# Patient Record
Sex: FEMALE | Race: WHITE | NOT HISPANIC OR LATINO | Employment: OTHER | URBAN - METROPOLITAN AREA
[De-identification: names, ages, dates, MRNs, and addresses within clinical notes are randomized per-mention and may not be internally consistent; named-entity substitution may affect disease eponyms.]

---

## 2017-02-10 ENCOUNTER — APPOINTMENT (OUTPATIENT)
Dept: WOMENS IMAGING | Facility: HOSPITAL | Age: 67
End: 2017-02-10

## 2017-02-10 PROCEDURE — G0202 SCR MAMMO BI INCL CAD: HCPCS | Performed by: RADIOLOGY

## 2018-05-10 ENCOUNTER — APPOINTMENT (OUTPATIENT)
Dept: WOMENS IMAGING | Facility: HOSPITAL | Age: 68
End: 2018-05-10

## 2018-05-10 PROCEDURE — 77063 BREAST TOMOSYNTHESIS BI: CPT | Performed by: RADIOLOGY

## 2018-05-10 PROCEDURE — 77067 SCR MAMMO BI INCL CAD: CPT | Performed by: RADIOLOGY

## 2019-04-23 RX ORDER — BUDESONIDE AND FORMOTEROL FUMARATE DIHYDRATE 160; 4.5 UG/1; UG/1
2 AEROSOL RESPIRATORY (INHALATION)
COMMUNITY
End: 2019-04-23 | Stop reason: SDUPTHER

## 2019-04-23 RX ORDER — BUDESONIDE AND FORMOTEROL FUMARATE DIHYDRATE 160; 4.5 UG/1; UG/1
2 AEROSOL RESPIRATORY (INHALATION)
Qty: 3 INHALER | Refills: 3 | Status: SHIPPED | OUTPATIENT
Start: 2019-04-23 | End: 2019-05-21 | Stop reason: SDUPTHER

## 2019-05-21 ENCOUNTER — OFFICE VISIT (OUTPATIENT)
Dept: INTERNAL MEDICINE | Facility: CLINIC | Age: 69
End: 2019-05-21

## 2019-05-21 VITALS
HEART RATE: 76 BPM | SYSTOLIC BLOOD PRESSURE: 128 MMHG | HEIGHT: 67 IN | BODY MASS INDEX: 27.62 KG/M2 | WEIGHT: 176 LBS | DIASTOLIC BLOOD PRESSURE: 72 MMHG

## 2019-05-21 DIAGNOSIS — Z00.00 MEDICARE ANNUAL WELLNESS VISIT, SUBSEQUENT: ICD-10-CM

## 2019-05-21 DIAGNOSIS — G43.709 CHRONIC MIGRAINE WITHOUT AURA WITHOUT STATUS MIGRAINOSUS, NOT INTRACTABLE: ICD-10-CM

## 2019-05-21 DIAGNOSIS — R49.0 HOARSENESS: ICD-10-CM

## 2019-05-21 DIAGNOSIS — J45.20 MILD INTERMITTENT ASTHMA WITHOUT COMPLICATION: ICD-10-CM

## 2019-05-21 DIAGNOSIS — K52.9 CHRONIC DIARRHEA: ICD-10-CM

## 2019-05-21 DIAGNOSIS — I10 ESSENTIAL HYPERTENSION: Primary | ICD-10-CM

## 2019-05-21 PROBLEM — K21.9 GASTROESOPHAGEAL REFLUX DISEASE: Status: ACTIVE | Noted: 2019-05-21

## 2019-05-21 PROBLEM — M72.0 DUPUYTREN'S CONTRACTURE: Status: ACTIVE | Noted: 2019-05-21

## 2019-05-21 PROBLEM — G43.909 MIGRAINE: Status: ACTIVE | Noted: 2019-05-21

## 2019-05-21 PROBLEM — J45.909 ASTHMA: Status: ACTIVE | Noted: 2019-05-21

## 2019-05-21 PROBLEM — M16.9 OSTEOARTHRITIS OF HIP: Status: ACTIVE | Noted: 2019-05-21

## 2019-05-21 PROCEDURE — G0439 PPPS, SUBSEQ VISIT: HCPCS | Performed by: INTERNAL MEDICINE

## 2019-05-21 PROCEDURE — 96160 PT-FOCUSED HLTH RISK ASSMT: CPT | Performed by: INTERNAL MEDICINE

## 2019-05-21 RX ORDER — CITALOPRAM 20 MG/1
TABLET ORAL
COMMUNITY
Start: 2019-04-09 | End: 2020-02-05

## 2019-05-21 RX ORDER — SPIRONOLACTONE 100 MG/1
100 TABLET, FILM COATED ORAL DAILY
COMMUNITY
End: 2020-05-21 | Stop reason: SDUPTHER

## 2019-05-21 RX ORDER — MONTELUKAST SODIUM 10 MG/1
TABLET ORAL
COMMUNITY
Start: 2019-04-09 | End: 2019-08-19 | Stop reason: SDUPTHER

## 2019-05-21 RX ORDER — DAPSONE 25 MG/1
25 TABLET ORAL DAILY
COMMUNITY

## 2019-05-21 RX ORDER — AMITRIPTYLINE HYDROCHLORIDE 10 MG/1
TABLET, FILM COATED ORAL
COMMUNITY
Start: 2019-04-09 | End: 2019-12-03 | Stop reason: SDUPTHER

## 2019-05-21 NOTE — PROGRESS NOTES
QUICK REFERENCE INFORMATION:  The ABCs of the Annual Wellness Visit    Subsequent Medicare Wellness Visit    HEALTH RISK ASSESSMENT    1950    Recent Hospitalizations:  No hospitalization(s) within the last year..    Current Medical Providers:  Patient Care Team:  Myrna Tafoya MD as PCP - General (Internal Medicine)  Iza Porter MD as Consulting Physician (Obstetrics and Gynecology)    Smoking Status:  Social History     Tobacco Use   Smoking Status Former Smoker   • Last attempt to quit:    • Years since quittin.3   Smokeless Tobacco Never Used       Alcohol Consumption:  Social History     Substance and Sexual Activity   Alcohol Use Yes   • Alcohol/week: 1.2 oz   • Types: 2 Cans of beer per week    Comment: socially       Depression Screen:   PHQ-2/PHQ-9 Depression Screening 2019   Little interest or pleasure in doing things 0   Feeling down, depressed, or hopeless 0   Trouble falling or staying asleep, or sleeping too much 0   Feeling tired or having little energy 0   Poor appetite or overeating 0   Feeling bad about yourself - or that you are a failure or have let yourself or your family down 0   Trouble concentrating on things, such as reading the newspaper or watching television 0   Moving or speaking so slowly that other people could have noticed. Or the opposite - being so fidgety or restless that you have been moving around a lot more than usual 0   Thoughts that you would be better off dead, or of hurting yourself in some way 0   Total Score 0       Health Habits and Functional and Cognitive Screening:  Functional & Cognitive Status 2019   Do you have difficulty preparing food and eating? No   Do you have difficulty bathing yourself, getting dressed or grooming yourself? No   Do you have difficulty using the toilet? No   Do you have difficulty moving around from place to place? No   Do you have trouble with steps or getting out of a bed or a chair? No   In the past year have  you fallen or experienced a near fall? No   Current Diet Well Balanced Diet   Dental Exam Up to date   Eye Exam Up to date   Exercise (times per week) 3 times per week   Current Exercise Activities Include Walking   Do you need help using the phone?  No   Are you deaf or do you have serious difficulty hearing?  No   Do you need help with transportation? No   Do you need help shopping? No   Do you need help preparing meals?  No   Do you need help with housework?  No   Do you need help with laundry? No   Do you need help taking your medications? No   Do you need help managing money? No   Do you ever drive or ride in a car without wearing a seat belt? No   Have you felt unusual stress, anger or loneliness in the last month? No   Who do you live with? Alone   If you need help, do you have trouble finding someone available to you? No   Have you been bothered in the last four weeks by sexual problems? No   Do you have difficulty concentrating, remembering or making decisions? No     Activities of Daily Living  Do you have difficulty preparing food and eating?: No  Do you have difficulty bathing yourself, getting dressed or grooming yourself?: No  Do you have difficulty using the toilet?: No  Do you have difficulty moving around from place to place?: No  Do you have trouble with steps or getting out of a bed or a chair?: No  In the past year have you fallen or experienced a near fall?: No  Instrumental Activities of Daily Living  Do you need help using the phone? : No  Are you deaf or do you have serious difficulty hearing? : No  Do you need help with transportation?: No  Do you need help shopping?: No  Do you need help preparing meals? : No  Do you need help with housework? : No  Do you need help with laundry?: No  Do you need help taking your medications?: No  Do you need help managing money?: No  Do you ever drive or ride in a car without wearing a seat belt?: No  Psychosocial Risks  Have you felt unusual stress, anger  or loneliness in the last month?: No  Who do you live with?: Alone  If you need help, do you have trouble finding someone available to you?: No  Have you been bothered in the last four weeks by sexual problems?: No  Cognitive Status  Do you have difficulty concentrating, remembering or making decisions?: No  Health Habits  Current Diet: Well Balanced Diet  Dental Exam: Up to date  Eye Exam: Up to date  Exercise (times per week): 3 times per week  Current Exercise Activities Include: Walking    Does the patient have evidence of cognitive impairment? No     Aspirin use counseling: Does not need ASA (and currently is not on it)    Recent Lab Results:  CMP:  Lab Results   Component Value Date    GLU 91 08/14/2015    BUN 9 08/14/2015    CREATININE 0.81 08/14/2015    EGFRIFNONA >60 08/14/2015    EGFRIFAFRI >60 08/14/2015    BCR 11 08/14/2015     08/14/2015    K 4.7 08/14/2015    CO2 26 08/14/2015    CALCIUM 10.2 08/14/2015    PROTENTOTREF 7.6 08/14/2015    ALBUMIN 4.7 08/14/2015    LABGLOBREF 2.9 08/14/2015    LABIL2 1.6 08/14/2015    BILITOT 0.4 08/14/2015    ALKPHOS 53 08/14/2015    AST 28 08/14/2015    ALT 33 08/14/2015     Lipid Panel:  Lab Results   Component Value Date    TRIG 123 08/14/2015    HDL 57 08/14/2015    VLDL 25 08/14/2015     HbA1c:       Visual Acuity:  No exam data present    Age-appropriate Screening Schedule:  Refer to the list below for future screening recommendations based on patient's age, sex and/or medical conditions. Orders for these recommended tests are listed in the plan section. The patient has been provided with a written plan.    Health Maintenance   Topic Date Due   • TDAP/TD VACCINES (1 - Tdap) 07/19/1967   • ZOSTER VACCINE (1 of 2) 07/19/1998   • PNEUMOCOCCAL VACCINES (65+ LOW/MEDIUM RISK) (1 of 2 - PCV13) 07/19/2013   • COLONOSCOPY  02/12/2016   • INFLUENZA VACCINE  08/01/2019        Subjective   History of Present Illness    Che Landaverde is a 68 y.o. female who presents for  a Subsequent Wellness Visit.  HPI  Overall no issues- she does have occ cramping in her fingers and toes.  Not associated with increase in diarrhea, etc.  Eating and drinking well.    Hoarseness is better, or at least stable.    The following portions of the patient's history were reviewed and updated as appropriate: allergies, current medications, past family history, past medical history, past social history, past surgical history and problem list.    Outpatient Medications Prior to Visit   Medication Sig Dispense Refill   • amitriptyline (ELAVIL) 10 MG tablet      • budesonide-formoterol (SYMBICORT) 160-4.5 MCG/ACT inhaler 2 (two) times a day.     • calcium citrate-vitamin d (CALCIUM CITRATE + D3) 200-250 MG-UNIT tablet tablet Take  by mouth.     • Cetirizine HCl (ZYRTEC ALLERGY) 10 MG capsule Take  by mouth daily.     • citalopram (CeleXA) 20 MG tablet      • dapsone 25 MG tablet Take 25 mg by mouth Daily.     • fluticasone (FLONASE) 50 MCG/ACT nasal spray into each nostril.     • gabapentin (NEURONTIN) 300 MG capsule Take 1 capsule (300 mg total) by mouth nightly. 90 capsule 1   • metoprolol tartrate (LOPRESSOR) 25 MG tablet Take  by mouth every night.     • MICRONIZED COLESTIPOL HCL 1 G tablet      • montelukast (SINGULAIR) 10 MG tablet      • Multiple Vitamins-Minerals (MULTI COMPLETE PO) Take  by mouth.     • Omega-3 Fatty Acids (FISH OIL) 1200 MG capsule capsule Take  by mouth.     • omeprazole (PriLOSEC) 40 MG capsule Take 40 mg by mouth daily.     • polycarbophil (FIBERCON) 625 MG tablet Take 625 mg by mouth as needed.     • Probiotic capsule Take  by mouth.     • spironolactone (ALDACTONE) 100 MG tablet Take  by mouth daily.     • spironolactone (ALDACTONE) 100 MG tablet Take 100 mg by mouth Daily.     • SUMAtriptan (IMITREX) 100 MG tablet Take one tablet at onset of headache. May repeat dose one time in 2 hours if headache not relieved.     • VENTOLIN  (90 BASE) MCG/ACT inhaler      •  "diphenoxylate-atropine (LOMOTIL) 2.5-0.025 MG per tablet Take 1-2 tablets by mouth 4 (four) times a day as needed for diarrhea. 120 tablet 2   • amitriptyline (ELAVIL) 25 MG tablet Take  by mouth.     • budesonide-formoterol (SYMBICORT) 160-4.5 MCG/ACT inhaler Inhale 2 puffs 2 (Two) Times a Day. 3 inhaler 3   • diclofenac (VOLTAREN) 75 MG EC tablet TAKE 1 TABLET EVERY DAY 90 tablet 0   • lansoprazole (PREVACID) 30 MG capsule Take 1 capsule by mouth 2 (two) times a day.       No facility-administered medications prior to visit.        Patient Active Problem List   Diagnosis   • Asthma   • Chronic diarrhea   • Gastroesophageal reflux disease   • Hoarseness   • Hypertension   • Migraine   • Osteoarthritis of hip   • Dupuytren's contracture       Advance Care Planning:  Patient has an advance directive - a copy has not been provided. Have asked the patient to send this to us to add to record    Identification of Risk Factors:  Risk factors include: hearing limitations    Review of Systems   Constitutional: Negative for unexpected weight change.   HENT: Negative.    Respiratory: Negative.    Cardiovascular: Negative.    Genitourinary: Negative.    Neurological: Negative.    Psychiatric/Behavioral: Negative.        Compared to one year ago, the patient feels her physical health is better and her mental health is worse.    Objective     Physical Exam   Constitutional: No distress.   Cardiovascular: Normal rate, regular rhythm and normal heart sounds.   Pulmonary/Chest: Effort normal and breath sounds normal.   Musculoskeletal: She exhibits no edema.       Vitals:    05/21/19 0728 05/21/19 0804   BP:  128/72   Pulse:  76   Weight: 79.8 kg (176 lb)    Height: 170.2 cm (67\")        Patient's Body mass index is 27.57 kg/m². BMI is within normal parameters. No follow-up required..        Assessment/Plan   Patient Self-Management and Personalized Health Advice  The patient has been provided with information about: none and " preventive services including:     · Exercise counseling provided, Shingles Vaccine recommended.    Visit Diagnoses:  Problem List Items Addressed This Visit        Cardiovascular and Mediastinum    Hypertension - Primary    Relevant Medications    spironolactone (ALDACTONE) 100 MG tablet    Other Relevant Orders    Comprehensive Metabolic Panel    Migraine    Relevant Medications    amitriptyline (ELAVIL) 10 MG tablet    citalopram (CeleXA) 20 MG tablet       Respiratory    Asthma    Relevant Medications    montelukast (SINGULAIR) 10 MG tablet       Digestive    Chronic diarrhea       Other    Hoarseness      Other Visit Diagnoses     Medicare annual wellness visit, subsequent        to get Shingrix at pharmacy- other immunizations UTD          Orders Placed This Encounter   Procedures   • Comprehensive Metabolic Panel       Outpatient Encounter Medications as of 5/21/2019   Medication Sig Dispense Refill   • amitriptyline (ELAVIL) 10 MG tablet      • budesonide-formoterol (SYMBICORT) 160-4.5 MCG/ACT inhaler 2 (two) times a day.     • calcium citrate-vitamin d (CALCIUM CITRATE + D3) 200-250 MG-UNIT tablet tablet Take  by mouth.     • Cetirizine HCl (ZYRTEC ALLERGY) 10 MG capsule Take  by mouth daily.     • citalopram (CeleXA) 20 MG tablet      • dapsone 25 MG tablet Take 25 mg by mouth Daily.     • fluticasone (FLONASE) 50 MCG/ACT nasal spray into each nostril.     • gabapentin (NEURONTIN) 300 MG capsule Take 1 capsule (300 mg total) by mouth nightly. 90 capsule 1   • metoprolol tartrate (LOPRESSOR) 25 MG tablet Take  by mouth every night.     • MICRONIZED COLESTIPOL HCL 1 G tablet      • montelukast (SINGULAIR) 10 MG tablet      • Multiple Vitamins-Minerals (MULTI COMPLETE PO) Take  by mouth.     • Omega-3 Fatty Acids (FISH OIL) 1200 MG capsule capsule Take  by mouth.     • omeprazole (PriLOSEC) 40 MG capsule Take 40 mg by mouth daily.     • polycarbophil (FIBERCON) 625 MG tablet Take 625 mg by mouth as needed.      • Probiotic capsule Take  by mouth.     • spironolactone (ALDACTONE) 100 MG tablet Take  by mouth daily.     • spironolactone (ALDACTONE) 100 MG tablet Take 100 mg by mouth Daily.     • SUMAtriptan (IMITREX) 100 MG tablet Take one tablet at onset of headache. May repeat dose one time in 2 hours if headache not relieved.     • VENTOLIN  (90 BASE) MCG/ACT inhaler      • diphenoxylate-atropine (LOMOTIL) 2.5-0.025 MG per tablet Take 1-2 tablets by mouth 4 (four) times a day as needed for diarrhea. 120 tablet 2   • [DISCONTINUED] amitriptyline (ELAVIL) 25 MG tablet Take  by mouth.     • [DISCONTINUED] budesonide-formoterol (SYMBICORT) 160-4.5 MCG/ACT inhaler Inhale 2 puffs 2 (Two) Times a Day. 3 inhaler 3   • [DISCONTINUED] diclofenac (VOLTAREN) 75 MG EC tablet TAKE 1 TABLET EVERY DAY 90 tablet 0   • [DISCONTINUED] lansoprazole (PREVACID) 30 MG capsule Take 1 capsule by mouth 2 (two) times a day.       No facility-administered encounter medications on file as of 5/21/2019.        Reviewed use of high risk medication in the elderly: not applicable  Reviewed for potential of harmful drug interactions in the elderly: not applicable    Follow Up:  Return in about 6 months (around 11/21/2019).     An After Visit Summary and PPPS with all of these plans were given to the patient.

## 2019-06-11 ENCOUNTER — APPOINTMENT (OUTPATIENT)
Dept: WOMENS IMAGING | Facility: HOSPITAL | Age: 69
End: 2019-06-11

## 2019-06-11 PROCEDURE — 77063 BREAST TOMOSYNTHESIS BI: CPT | Performed by: RADIOLOGY

## 2019-06-11 PROCEDURE — 77067 SCR MAMMO BI INCL CAD: CPT | Performed by: RADIOLOGY

## 2019-07-22 RX ORDER — MONTELUKAST SODIUM 4 MG/1
TABLET, CHEWABLE ORAL
Qty: 180 TABLET | Refills: 3 | Status: SHIPPED | OUTPATIENT
Start: 2019-07-22 | End: 2021-02-05 | Stop reason: SDUPTHER

## 2019-08-19 RX ORDER — MONTELUKAST SODIUM 10 MG/1
TABLET ORAL
Qty: 90 TABLET | Refills: 1 | Status: SHIPPED | OUTPATIENT
Start: 2019-08-19 | End: 2020-01-21

## 2019-08-19 RX ORDER — GABAPENTIN 300 MG/1
CAPSULE ORAL
Qty: 180 CAPSULE | Refills: 1 | Status: SHIPPED | OUTPATIENT
Start: 2019-08-19 | End: 2020-02-05

## 2019-11-20 RX ORDER — BUDESONIDE AND FORMOTEROL FUMARATE DIHYDRATE 160; 4.5 UG/1; UG/1
2 AEROSOL RESPIRATORY (INHALATION) EVERY 12 HOURS
Qty: 3 INHALER | Refills: 3 | Status: SHIPPED | OUTPATIENT
Start: 2019-11-20 | End: 2021-01-05

## 2019-11-21 ENCOUNTER — OFFICE VISIT (OUTPATIENT)
Dept: INTERNAL MEDICINE | Facility: CLINIC | Age: 69
End: 2019-11-21

## 2019-11-21 VITALS
WEIGHT: 176 LBS | SYSTOLIC BLOOD PRESSURE: 118 MMHG | HEART RATE: 74 BPM | HEIGHT: 67 IN | BODY MASS INDEX: 27.62 KG/M2 | DIASTOLIC BLOOD PRESSURE: 64 MMHG

## 2019-11-21 DIAGNOSIS — K90.0 CELIAC DISEASE: ICD-10-CM

## 2019-11-21 DIAGNOSIS — E78.5 HYPERLIPIDEMIA, UNSPECIFIED HYPERLIPIDEMIA TYPE: ICD-10-CM

## 2019-11-21 DIAGNOSIS — I10 ESSENTIAL HYPERTENSION: Primary | ICD-10-CM

## 2019-11-21 PROCEDURE — 99213 OFFICE O/P EST LOW 20 MIN: CPT | Performed by: INTERNAL MEDICINE

## 2019-11-21 NOTE — PROGRESS NOTES
Assessment and Plan  Che was seen today for hypertension and gi problem.    Diagnoses and all orders for this visit:    Essential hypertension  -     Comprehensive Metabolic Panel; Future    Hyperlipidemia, unspecified hyperlipidemia type  -     Lipid Panel With LDL / HDL Ratio; Future  -     TSH; Future    Celiac disease  Comments:  small bowel biopsy was neg although labs are + I think she can have some gluten on a limited basis- watch for bowel change, rash.      F/U and Patient Instructions    Return in about 6 months (around 5/21/2020) for Medicare Wellness.  There are no Patient Instructions on file for this visit.    Subjective    Che Landaverde is a 69 y.o. female being seen in our office today for Hypertension and GI Problem     History of the Present Illness  HPI Pt diagnosed with celiac after biopsies of skin.  She has done well on gluten free diet and Dapsone- has had no further skin manifestations.  Has tried to stop the Dapsone but the blisters come back.   Reviewed labs from 2016- told that they were + due to microscopic colitis, negative small bowel biopsies.        Patient History        Significant Past History  The following portions of the patient's history were reviewed and updated as appropriate:PMHroutine: Social history , Allergies, Current Medications, Active Problem List and Health Maintenance              Social History  She  reports that she quit smoking about 9 years ago. She has never used smokeless tobacco. She reports that she drinks about 1.2 oz of alcohol per week. She reports that she does not use drugs.                         Review of Symptoms  Review of Systems   Constitution: Negative for weight loss.   Cardiovascular: Negative.    Respiratory: Negative.    Skin:        Under control   Gastrointestinal: Negative for bloating, abdominal pain, change in bowel habit and diarrhea.     Objective  Vital Signs         BP Readings from Last 1 Encounters:   11/21/19 118/64     Wt  Readings from Last 3 Encounters:   11/21/19 79.8 kg (176 lb)   09/22/19 79.4 kg (175 lb)   05/21/19 79.8 kg (176 lb)   Body mass index is 27.57 kg/m².          Physical Exam   Physical Exam   Constitutional: No distress.   Cardiovascular: Normal rate.   Abdominal: Soft.     Data Reviewed    No results found for this or any previous visit (from the past 2016 hour(s)).

## 2019-12-03 RX ORDER — AMITRIPTYLINE HYDROCHLORIDE 10 MG/1
TABLET, FILM COATED ORAL
Qty: 180 TABLET | Refills: 0 | Status: SHIPPED | OUTPATIENT
Start: 2019-12-03 | End: 2020-02-05

## 2020-01-21 RX ORDER — MONTELUKAST SODIUM 10 MG/1
TABLET ORAL
Qty: 90 TABLET | Refills: 1 | Status: SHIPPED | OUTPATIENT
Start: 2020-01-21 | End: 2020-05-06

## 2020-02-05 DIAGNOSIS — G43.709 CHRONIC MIGRAINE WITHOUT AURA WITHOUT STATUS MIGRAINOSUS, NOT INTRACTABLE: Primary | ICD-10-CM

## 2020-02-05 RX ORDER — CITALOPRAM 20 MG/1
TABLET ORAL
Qty: 90 TABLET | Refills: 2 | Status: SHIPPED | OUTPATIENT
Start: 2020-02-05 | End: 2020-09-08

## 2020-02-05 RX ORDER — AMITRIPTYLINE HYDROCHLORIDE 10 MG/1
TABLET, FILM COATED ORAL
Qty: 180 TABLET | Refills: 1 | Status: SHIPPED | OUTPATIENT
Start: 2020-02-05 | End: 2020-06-23

## 2020-02-05 RX ORDER — GABAPENTIN 300 MG/1
CAPSULE ORAL
Qty: 180 CAPSULE | Refills: 1 | Status: SHIPPED | OUTPATIENT
Start: 2020-02-05 | End: 2020-05-08 | Stop reason: SDUPTHER

## 2020-02-05 RX ORDER — FLUTICASONE PROPIONATE 50 MCG
SPRAY, SUSPENSION (ML) NASAL
Qty: 48 G | Refills: 1 | Status: SHIPPED | OUTPATIENT
Start: 2020-02-05 | End: 2021-01-05

## 2020-03-19 RX ORDER — OMEPRAZOLE 40 MG/1
CAPSULE, DELAYED RELEASE ORAL
Qty: 90 CAPSULE | Refills: 3 | Status: SHIPPED | OUTPATIENT
Start: 2020-03-19 | End: 2021-03-29 | Stop reason: SDUPTHER

## 2020-04-14 ENCOUNTER — TELEMEDICINE (OUTPATIENT)
Dept: INTERNAL MEDICINE | Facility: CLINIC | Age: 70
End: 2020-04-14

## 2020-04-14 ENCOUNTER — E-VISIT (OUTPATIENT)
Dept: INTERNAL MEDICINE | Facility: CLINIC | Age: 70
End: 2020-04-14

## 2020-04-14 DIAGNOSIS — J98.01 COUGH DUE TO BRONCHOSPASM: Primary | ICD-10-CM

## 2020-04-14 PROBLEM — K52.9 CHRONIC DIARRHEA: Status: RESOLVED | Noted: 2019-05-21 | Resolved: 2020-04-14

## 2020-04-14 PROCEDURE — 99213 OFFICE O/P EST LOW 20 MIN: CPT | Performed by: INTERNAL MEDICINE

## 2020-04-14 NOTE — PROGRESS NOTES
"  Assessment and Plan  Che was seen today for cough.    Diagnoses and all orders for this visit:    Cough due to bronchospasm  Comments:  has ventolin at home- will use Q4 hours, and prn episodes of coughing spells.  Call back in 48 hours if no improvement or sooner, if she worsens.       F/U and Patient Instructions    No follow-ups on file.  There are no Patient Instructions on file for this visit.    Subjective    Che MCKEE Layman is a 69 y.o. female being seen in our office today for Cough     History of the Present Illness  HPI Via video visit- she has been practicing social distancing, staying home, etc.  She is being evaluated today for a cough- she says she is having \"fits\" of coughing several times a day- chest gets tight.  She does have production occ of yellow sputum.  No fever, chills, n/v/d.  She has been walking outside.    Patient History        Significant Past History  The following portions of the patient's history were reviewed and updated as appropriate:PMHroutine: Social history , Past Medical History, Allergies, Current Medications, Active Problem List and Health Maintenance              Social History  She  reports that she quit smoking about 10 years ago. She has never used smokeless tobacco. She reports that she drinks about 2.0 standard drinks of alcohol per week. She reports that she does not use drugs.                         Review of Symptoms  Review of Systems   Constitution: Negative for chills, decreased appetite and fever.   HENT: Positive for congestion. Negative for sore throat. Hoarse voice: chronic.    Cardiovascular: Negative.    Respiratory: Positive for cough, sputum production and wheezing. Negative for hemoptysis and sleep disturbances due to breathing. Shortness of breath: only during coughing spell.    Endocrine: Negative.    Gastrointestinal: Negative.    Genitourinary: Negative.    Psychiatric/Behavioral: Negative.      Objective  Vital Signs         BP Readings from " Last 1 Encounters:   11/21/19 118/64     Wt Readings from Last 3 Encounters:   11/21/19 79.8 kg (176 lb)   09/22/19 79.4 kg (175 lb)   05/21/19 79.8 kg (176 lb)   There is no height or weight on file to calculate BMI.          Physical Exam   Physical Exam   Constitutional: No distress.     Data Reviewed    No results found for this or any previous visit (from the past 2016 hour(s)).

## 2020-04-21 ENCOUNTER — RESULTS ENCOUNTER (OUTPATIENT)
Dept: INTERNAL MEDICINE | Facility: CLINIC | Age: 70
End: 2020-04-21

## 2020-04-21 DIAGNOSIS — I10 ESSENTIAL HYPERTENSION: ICD-10-CM

## 2020-04-21 DIAGNOSIS — E78.5 HYPERLIPIDEMIA, UNSPECIFIED HYPERLIPIDEMIA TYPE: ICD-10-CM

## 2020-05-06 RX ORDER — ALBUTEROL SULFATE 90 UG/1
2 AEROSOL, METERED RESPIRATORY (INHALATION) EVERY 4 HOURS PRN
Qty: 3 INHALER | Refills: 0 | Status: SHIPPED | OUTPATIENT
Start: 2020-05-06 | End: 2021-03-15

## 2020-05-06 RX ORDER — SPIRONOLACTONE 100 MG/1
TABLET, FILM COATED ORAL
Qty: 90 TABLET | Refills: 0 | Status: SHIPPED | OUTPATIENT
Start: 2020-05-06 | End: 2020-05-21 | Stop reason: SDUPTHER

## 2020-05-06 RX ORDER — MONTELUKAST SODIUM 10 MG/1
TABLET ORAL
Qty: 90 TABLET | Refills: 1 | Status: SHIPPED | OUTPATIENT
Start: 2020-05-06 | End: 2021-10-12

## 2020-05-08 DIAGNOSIS — G43.709 CHRONIC MIGRAINE WITHOUT AURA WITHOUT STATUS MIGRAINOSUS, NOT INTRACTABLE: ICD-10-CM

## 2020-05-08 RX ORDER — GABAPENTIN 300 MG/1
300 CAPSULE ORAL 2 TIMES DAILY
Qty: 180 CAPSULE | Refills: 0 | Status: SHIPPED | OUTPATIENT
Start: 2020-05-08 | End: 2020-09-21 | Stop reason: SDUPTHER

## 2020-05-14 LAB
ALBUMIN SERPL-MCNC: 4.3 G/DL (ref 3.5–5.2)
ALBUMIN/GLOB SERPL: 1.4 G/DL
ALP SERPL-CCNC: 53 U/L (ref 39–117)
ALT SERPL-CCNC: 20 U/L (ref 1–33)
AST SERPL-CCNC: 14 U/L (ref 1–32)
BILIRUB SERPL-MCNC: 0.3 MG/DL (ref 0.2–1.2)
BUN SERPL-MCNC: 10 MG/DL (ref 8–23)
BUN/CREAT SERPL: 13 (ref 7–25)
CALCIUM SERPL-MCNC: 10 MG/DL (ref 8.6–10.5)
CHLORIDE SERPL-SCNC: 99 MMOL/L (ref 98–107)
CHOLEST SERPL-MCNC: 208 MG/DL (ref 0–200)
CO2 SERPL-SCNC: 30.8 MMOL/L (ref 22–29)
CREAT SERPL-MCNC: 0.77 MG/DL (ref 0.57–1)
GLOBULIN SER CALC-MCNC: 3 GM/DL
GLUCOSE SERPL-MCNC: 118 MG/DL (ref 65–99)
HDLC SERPL-MCNC: 48 MG/DL (ref 40–60)
LDLC SERPL CALC-MCNC: 132 MG/DL (ref 0–100)
LDLC/HDLC SERPL: 2.75 {RATIO}
POTASSIUM SERPL-SCNC: 5.2 MMOL/L (ref 3.5–5.2)
PROT SERPL-MCNC: 7.3 G/DL (ref 6–8.5)
SODIUM SERPL-SCNC: 139 MMOL/L (ref 136–145)
TRIGL SERPL-MCNC: 141 MG/DL (ref 0–150)
TSH SERPL DL<=0.005 MIU/L-ACNC: 2.19 UIU/ML (ref 0.27–4.2)
VLDLC SERPL CALC-MCNC: 28.2 MG/DL

## 2020-05-21 ENCOUNTER — OFFICE VISIT (OUTPATIENT)
Dept: INTERNAL MEDICINE | Facility: CLINIC | Age: 70
End: 2020-05-21

## 2020-05-21 VITALS
HEART RATE: 76 BPM | WEIGHT: 180 LBS | TEMPERATURE: 97.5 F | BODY MASS INDEX: 28.25 KG/M2 | SYSTOLIC BLOOD PRESSURE: 120 MMHG | DIASTOLIC BLOOD PRESSURE: 74 MMHG | HEIGHT: 67 IN

## 2020-05-21 DIAGNOSIS — Z00.00 MEDICARE ANNUAL WELLNESS VISIT, SUBSEQUENT: ICD-10-CM

## 2020-05-21 DIAGNOSIS — J45.20 MILD INTERMITTENT ASTHMA WITHOUT COMPLICATION: ICD-10-CM

## 2020-05-21 DIAGNOSIS — Z12.2 ENCOUNTER FOR SCREENING FOR LUNG CANCER: ICD-10-CM

## 2020-05-21 DIAGNOSIS — I10 ESSENTIAL HYPERTENSION: Primary | ICD-10-CM

## 2020-05-21 DIAGNOSIS — R73.9 HYPERGLYCEMIA: ICD-10-CM

## 2020-05-21 PROCEDURE — 96160 PT-FOCUSED HLTH RISK ASSMT: CPT | Performed by: INTERNAL MEDICINE

## 2020-05-21 PROCEDURE — G0439 PPPS, SUBSEQ VISIT: HCPCS | Performed by: INTERNAL MEDICINE

## 2020-05-21 PROCEDURE — 90471 IMMUNIZATION ADMIN: CPT | Performed by: INTERNAL MEDICINE

## 2020-05-21 PROCEDURE — 90715 TDAP VACCINE 7 YRS/> IM: CPT | Performed by: INTERNAL MEDICINE

## 2020-05-21 RX ORDER — VALACYCLOVIR HYDROCHLORIDE 500 MG/1
500 TABLET, FILM COATED ORAL DAILY
COMMUNITY
End: 2021-05-25

## 2020-05-21 NOTE — PROGRESS NOTES
The ABCs of the Annual Wellness Visit  Subsequent Medicare Wellness Visit    Chief Complaint   Patient presents with   • Medicare Wellness-subsequent       Subjective   History of Present Illness:  Che Landaverde is a 69 y.o. female who presents for a Subsequent Medicare Wellness Visit.  She has had some lightheadedness, vandana with getting up too fast.  She has not been exercising or eating well since quarantine.  She has just started making things better and already feels better.   Her cough comes and goes- back again.    She has been back on gluten- bowel looser and has some patches on her elbow.   Dexa followed by Dr. Porter- last dexa 2018.         HEALTH RISK ASSESSMENT    Recent Hospitalizations:  No hospitalization(s) within the last year.    Current Medical Providers:  Patient Care Team:  Myrna Tafoya MD as PCP - General (Internal Medicine)  Iza Porter MD as Consulting Physician (Obstetrics and Gynecology)    Smoking Status:  Social History     Tobacco Use   Smoking Status Former Smoker   • Last attempt to quit: 2010   • Years since quitting: 10.3   Smokeless Tobacco Never Used       Alcohol Consumption:  Social History     Substance and Sexual Activity   Alcohol Use Yes   • Alcohol/week: 2.0 standard drinks   • Types: 2 Cans of beer per week    Comment: socially       Depression Screen:   PHQ-2/PHQ-9 Depression Screening 5/21/2020   Little interest or pleasure in doing things 0   Feeling down, depressed, or hopeless 0   Trouble falling or staying asleep, or sleeping too much 0   Feeling tired or having little energy 0   Poor appetite or overeating 0   Feeling bad about yourself - or that you are a failure or have let yourself or your family down 0   Trouble concentrating on things, such as reading the newspaper or watching television 0   Moving or speaking so slowly that other people could have noticed. Or the opposite - being so fidgety or restless that you have been moving around a lot more than  usual 0   Thoughts that you would be better off dead, or of hurting yourself in some way 0   Total Score 0       Fall Risk Screen:  [place] Fall Risk Assessment was completed, and patient is at LOW risk for falls.Assessment completed on:5/21/2020    Health Habits and Functional and Cognitive Screening:  Functional & Cognitive Status 5/21/2020   Do you have difficulty preparing food and eating? No   Do you have difficulty bathing yourself, getting dressed or grooming yourself? No   Do you have difficulty using the toilet? No   Do you have difficulty moving around from place to place? No   Do you have trouble with steps or getting out of a bed or a chair? No   Current Diet Limited Junk Food   Dental Exam Up to date   Eye Exam Up to date   Exercise (times per week) 0 times per week   Current Exercise Activities Include None   Do you need help using the phone?  No   Are you deaf or do you have serious difficulty hearing?  No   Do you need help with transportation? No   Do you need help shopping? No   Do you need help preparing meals?  No   Do you need help with housework?  No   Do you need help with laundry? No   Do you need help taking your medications? No   Do you need help managing money? No   Do you ever drive or ride in a car without wearing a seat belt? No   Have you felt unusual stress, anger or loneliness in the last month? No   Who do you live with? Alone   If you need help, do you have trouble finding someone available to you? No   Have you been bothered in the last four weeks by sexual problems? No   Do you have difficulty concentrating, remembering or making decisions? No         Does the patient have evidence of cognitive impairment? No    Asprin use counseling:Does not need ASA (and currently is not on it)    Age-appropriate Screening Schedule:  Refer to the list below for future screening recommendations based on patient's age, sex and/or medical conditions. Orders for these recommended tests are listed in  the plan section. The patient has been provided with a written plan.    Health Maintenance   Topic Date Due   • TDAP/TD VACCINES (1 - Tdap) 07/19/1961   • DXA SCAN  11/19/2019   • ZOSTER VACCINE (2 of 2) 05/05/2020   • MAMMOGRAM  05/10/2020   • INFLUENZA VACCINE  08/01/2020   • LIPID PANEL  05/14/2021   • COLONOSCOPY  06/07/2026          The following portions of the patient's history were reviewed and updated as appropriate: allergies, current medications, past family history, past medical history, past social history, past surgical history and problem list.    Outpatient Medications Prior to Visit   Medication Sig Dispense Refill   • alendronate (FOSAMAX) 70 MG tablet Take 70 mg by mouth Daily.     • amitriptyline (ELAVIL) 10 MG tablet TAKE 2 TABLETS AT BEDTIME 180 tablet 1   • budesonide-formoterol (SYMBICORT) 160-4.5 MCG/ACT inhaler Inhale 2 puffs Every 12 (Twelve) Hours. 3 inhaler 3   • calcium citrate-vitamin d (CALCIUM CITRATE + D3) 200-250 MG-UNIT tablet tablet Take  by mouth.     • Cetirizine HCl (ZYRTEC ALLERGY) 10 MG capsule Take  by mouth daily.     • citalopram (CeleXA) 20 MG tablet TAKE 1 TABLET EVERY DAY 90 tablet 2   • colestipol (COLESTID) 1 g tablet TAKE 2 TABLETS EVERY  tablet 3   • dapsone 25 MG tablet Take 25 mg by mouth Daily.     • fluticasone (FLONASE) 50 MCG/ACT nasal spray USE 2 SPRAYS IN EACH NOSTRIL EVERY DAY 48 g 1   • gabapentin (NEURONTIN) 300 MG capsule Take 1 capsule by mouth 2 (Two) Times a Day. 180 capsule 0   • metoprolol tartrate (LOPRESSOR) 25 MG tablet TAKE 1 TABLET EVERY DAY 90 tablet 1   • montelukast (SINGULAIR) 10 MG tablet TAKE 1 TABLET EVERY DAY 90 tablet 1   • Multiple Vitamins-Minerals (MULTI COMPLETE PO) Take  by mouth.     • Omega-3 Fatty Acids (FISH OIL) 1200 MG capsule capsule Take  by mouth.     • omeprazole (priLOSEC) 40 MG capsule TAKE 1 CAPSULE EVERY DAY 90 capsule 3   • polycarbophil (FIBERCON) 625 MG tablet Take 625 mg by mouth as needed.     •  "Probiotic capsule Take  by mouth.     • spironolactone (ALDACTONE) 100 MG tablet Take  by mouth daily.     • SUMAtriptan (IMITREX) 100 MG tablet Take one tablet at onset of headache. May repeat dose one time in 2 hours if headache not relieved.     • valACYclovir (VALTREX) 500 MG tablet Take 500 mg by mouth Daily.     • VENTOLIN  (90 Base) MCG/ACT inhaler Inhale 2 puffs Every 4 (Four) Hours As Needed for Wheezing. 3 inhaler 0   • spironolactone (ALDACTONE) 100 MG tablet Take 100 mg by mouth Daily.     • spironolactone (ALDACTONE) 100 MG tablet TAKE 1 TABLET EVERY DAY 90 tablet 0     No facility-administered medications prior to visit.        Patient Active Problem List   Diagnosis   • Asthma   • Gastroesophageal reflux disease   • Hoarseness   • Essential hypertension   • Migraine   • Osteoarthritis of hip   • Dupuytren's contracture       Advanced Care Planning:  ACP discussion was held with the patient during this visit. Patient has an advance directive (not in EMR), copy requested.    Review of Systems   Constitutional: Negative.    HENT: Positive for congestion. Voice change: chronic.    Respiratory: Positive for cough. Negative for shortness of breath and wheezing.    Cardiovascular: Negative.    Gastrointestinal: Negative.    Endocrine: Negative.    Genitourinary: Urgency: related to cough.   Musculoskeletal: Negative.    Skin: Negative.    Neurological: Negative.    Hematological: Negative.    Psychiatric/Behavioral: Negative.        Compared to one year ago, the patient feels her physical health is the same.  Compared to one year ago, the patient feels her mental health is the same.    Reviewed chart for potential of high risk medication in the elderly: yes  Reviewed chart for potential of harmful drug interactions in the elderly:yes    Objective         Vitals:    05/21/20 1317   BP: 120/74   Pulse: 76   Temp: 97.5 °F (36.4 °C)   Weight: 81.6 kg (180 lb)   Height: 170.2 cm (67\")       Body mass index " is 28.19 kg/m².  Discussed the patient's BMI with her. The BMI is in the acceptable range.    Physical Exam   Constitutional: She is oriented to person, place, and time. No distress.   Cardiovascular: Normal rate, regular rhythm and normal heart sounds.   Pulmonary/Chest: Breath sounds normal. No respiratory distress. She has no wheezes.   Musculoskeletal: She exhibits no edema.   Neurological: She is alert and oriented to person, place, and time.   Psychiatric: She has a normal mood and affect. Her behavior is normal. Judgment and thought content normal.       Lab Results   Component Value Date     (H) 05/14/2020    CHLPL 208 (H) 05/14/2020    TRIG 141 05/14/2020    HDL 48 05/14/2020     (H) 05/14/2020    VLDL 28.2 05/14/2020        Assessment/Plan   Medicare Risks and Personalized Health Plan  CMS Preventative Services Quick Reference  none    The above risks/problems have been discussed with the patient.  Pertinent information has been shared with the patient in the After Visit Summary.  Follow up plans and orders are seen below in the Assessment/Plan Section.    Diagnoses and all orders for this visit:    1. Essential hypertension (Primary)  Comments:  control;led.    2. Encounter for screening for lung cancer    3. Hyperglycemia  Comments:  check A1C at f/u- increase walking again and watch sugar intake.    4. Mild intermittent asthma without complication  Comments:  doing ok now, some increase in cough but no wheezing.  Will call if worsens/changes.    5. Medicare annual wellness visit, subsequent  Comments:  reviewed immunizations- check CT chest- again, exercise as above.     Other orders  -     Cancel: Mammo Screening Bilateral With CAD      Follow Up:  Return in about 6 months (around 11/21/2020) for Lab Before FUP, Recheck.     An After Visit Summary and PPPS were given to the patient.

## 2020-06-04 ENCOUNTER — HOSPITAL ENCOUNTER (OUTPATIENT)
Dept: CT IMAGING | Facility: HOSPITAL | Age: 70
Discharge: HOME | End: 2020-06-04
Admitting: INTERNAL MEDICINE

## 2020-06-04 DIAGNOSIS — Z12.2 ENCOUNTER FOR SCREENING FOR LUNG CANCER: ICD-10-CM

## 2020-06-04 PROCEDURE — G0297 LDCT FOR LUNG CA SCREEN: HCPCS

## 2020-06-04 RX ORDER — AMOXICILLIN AND CLAVULANATE POTASSIUM 875; 125 MG/1; MG/1
1 TABLET, FILM COATED ORAL 2 TIMES DAILY
Qty: 20 TABLET | Refills: 0 | Status: ON HOLD | OUTPATIENT
Start: 2020-06-04 | End: 2020-08-25

## 2020-06-23 RX ORDER — AMITRIPTYLINE HYDROCHLORIDE 10 MG/1
TABLET, FILM COATED ORAL
Qty: 180 TABLET | Refills: 1 | Status: SHIPPED | OUTPATIENT
Start: 2020-06-23 | End: 2020-11-30

## 2020-07-27 ENCOUNTER — OFFICE VISIT (OUTPATIENT)
Dept: INTERNAL MEDICINE | Facility: CLINIC | Age: 70
End: 2020-07-27

## 2020-07-27 VITALS
SYSTOLIC BLOOD PRESSURE: 122 MMHG | WEIGHT: 182 LBS | BODY MASS INDEX: 28.56 KG/M2 | HEART RATE: 74 BPM | DIASTOLIC BLOOD PRESSURE: 68 MMHG | HEIGHT: 67 IN | TEMPERATURE: 97.7 F

## 2020-07-27 DIAGNOSIS — J45.40 MODERATE PERSISTENT ASTHMA WITHOUT COMPLICATION: Primary | ICD-10-CM

## 2020-07-27 DIAGNOSIS — R93.89 ABNORMAL CHEST CT: ICD-10-CM

## 2020-07-27 PROCEDURE — 99213 OFFICE O/P EST LOW 20 MIN: CPT | Performed by: INTERNAL MEDICINE

## 2020-07-27 NOTE — PROGRESS NOTES
Answers for HPI/ROS submitted by the patient on 7/25/2020   What is the primary reason for your visit?: Other  Please describe your symptoms.: Continuing cough.  After a diagnosis of pneumonia, Dr. Tafoya asked me to come in for a follow up.  Have you had these symptoms before?: Yes  How long have you been having these symptoms?: Greater than 2 weeks  Please list any medications you are currently taking for this condition.: Finished the prescribed antibiotics    Assessment and Plan  Che was seen today for pneumonia.    Diagnoses and all orders for this visit:    Moderate persistent asthma without complication  Comments:  I am getting PFTs and pulmonary consult since this problem has been so recurrent and difficult to get under control.  Orders:  -     Ambulatory Referral to Pulmonology  -     Full Pulmonary Function Test With Bronchodilator; Future    Abnormal chest CT  Comments:  due for f/u  Orders:  -     CT Chest Without Contrast; Future      F/U and Patient Instructions    Return for Keep previously scheduled appointment.  There are no Patient Instructions on file for this visit.    Subjective    Che Landaverde is a 70 y.o. female being seen in our office today for Pneumonia     History of the Present Illness  HPI Here to f/u after pneumonia- I did a video visit with her and treated her in April.  She did great for several weeks but seems the cough is getting worse again- since May.  She is no SOB but does have cough.  Phlegm is clear, no fevers.  This is something she has dealt with for years on and off.      Patient History        Significant Past History  The following portions of the patient's history were reviewed and updated as appropriate:PMHroutine: Social history , Allergies, Current Medications, Active Problem List and Health Maintenance              Social History  She  reports that she quit smoking about 10 years ago. She has never used smokeless tobacco. She reports that she drinks about 2.0  standard drinks of alcohol per week. She reports that she does not use drugs.                         Review of Symptoms  ROS  Objective  Vital Signs         BP Readings from Last 1 Encounters:   07/27/20 122/68     Wt Readings from Last 3 Encounters:   07/27/20 82.6 kg (182 lb)   05/21/20 81.6 kg (180 lb)   11/21/19 79.8 kg (176 lb)   Body mass index is 28.51 kg/m².          Physical Exam   Physical Exam  Data Reviewed    Recent Results (from the past 2016 hour(s))   Comprehensive Metabolic Panel    Collection Time: 05/14/20  8:33 AM   Result Value Ref Range    Glucose 118 (H) 65 - 99 mg/dL    BUN 10 8 - 23 mg/dL    Creatinine 0.77 0.57 - 1.00 mg/dL    eGFR Non African Am 74 >60 mL/min/1.73    eGFR African Am 90 >60 mL/min/1.73    BUN/Creatinine Ratio 13.0 7.0 - 25.0    Sodium 139 136 - 145 mmol/L    Potassium 5.2 3.5 - 5.2 mmol/L    Chloride 99 98 - 107 mmol/L    Total CO2 30.8 (H) 22.0 - 29.0 mmol/L    Calcium 10.0 8.6 - 10.5 mg/dL    Total Protein 7.3 6.0 - 8.5 g/dL    Albumin 4.30 3.50 - 5.20 g/dL    Globulin 3.0 gm/dL    A/G Ratio 1.4 g/dL    Total Bilirubin 0.3 0.2 - 1.2 mg/dL    Alkaline Phosphatase 53 39 - 117 U/L    AST (SGOT) 14 1 - 32 U/L    ALT (SGPT) 20 1 - 33 U/L   Lipid Panel With LDL / HDL Ratio    Collection Time: 05/14/20  8:33 AM   Result Value Ref Range    Total Cholesterol 208 (H) 0 - 200 mg/dL    Triglycerides 141 0 - 150 mg/dL    HDL Cholesterol 48 40 - 60 mg/dL    VLDL Cholesterol 28.2 mg/dL    LDL Cholesterol  132 (H) 0 - 100 mg/dL    LDL/HDL Ratio 2.75    TSH    Collection Time: 05/14/20  8:33 AM   Result Value Ref Range    TSH 2.190 0.270 - 4.200 uIU/mL

## 2020-07-31 ENCOUNTER — TRANSCRIBE ORDERS (OUTPATIENT)
Dept: ADMINISTRATIVE | Facility: HOSPITAL | Age: 70
End: 2020-07-31

## 2020-07-31 DIAGNOSIS — Z01.818 OTHER SPECIFIED PRE-OPERATIVE EXAMINATION: Primary | ICD-10-CM

## 2020-08-05 ENCOUNTER — LAB (OUTPATIENT)
Dept: LAB | Facility: HOSPITAL | Age: 70
End: 2020-08-05

## 2020-08-05 DIAGNOSIS — Z01.818 OTHER SPECIFIED PRE-OPERATIVE EXAMINATION: ICD-10-CM

## 2020-08-05 PROCEDURE — C9803 HOPD COVID-19 SPEC COLLECT: HCPCS

## 2020-08-05 PROCEDURE — U0004 COV-19 TEST NON-CDC HGH THRU: HCPCS

## 2020-08-06 LAB
REF LAB TEST METHOD: NORMAL
SARS-COV-2 RNA RESP QL NAA+PROBE: NOT DETECTED

## 2020-08-07 ENCOUNTER — HOSPITAL ENCOUNTER (OUTPATIENT)
Dept: RESPIRATORY THERAPY | Facility: HOSPITAL | Age: 70
Discharge: HOME | End: 2020-08-07
Admitting: INTERNAL MEDICINE

## 2020-08-07 DIAGNOSIS — J45.40 MODERATE PERSISTENT ASTHMA WITHOUT COMPLICATION: ICD-10-CM

## 2020-08-07 LAB
BDY SITE: NORMAL
HGB BLDA-MCNC: 13.7 G/DL (ref 12–18)

## 2020-08-07 PROCEDURE — 94060 EVALUATION OF WHEEZING: CPT

## 2020-08-07 PROCEDURE — 94640 AIRWAY INHALATION TREATMENT: CPT

## 2020-08-07 PROCEDURE — 94729 DIFFUSING CAPACITY: CPT

## 2020-08-07 PROCEDURE — 82820 HEMOGLOBIN-OXYGEN AFFINITY: CPT | Performed by: INTERNAL MEDICINE

## 2020-08-07 PROCEDURE — 94726 PLETHYSMOGRAPHY LUNG VOLUMES: CPT

## 2020-08-07 RX ORDER — ALBUTEROL SULFATE 2.5 MG/3ML
2.5 SOLUTION RESPIRATORY (INHALATION) ONCE
Status: COMPLETED | OUTPATIENT
Start: 2020-08-07 | End: 2020-08-07

## 2020-08-07 RX ADMIN — ALBUTEROL SULFATE 2.5 MG: 2.5 SOLUTION RESPIRATORY (INHALATION) at 12:08

## 2020-08-15 ENCOUNTER — HOSPITAL ENCOUNTER (OUTPATIENT)
Dept: CT IMAGING | Facility: HOSPITAL | Age: 70
Discharge: HOME | End: 2020-08-15

## 2020-08-15 DIAGNOSIS — R93.89 ABNORMAL CHEST CT: ICD-10-CM

## 2020-08-15 PROCEDURE — 71250 CT THORAX DX C-: CPT

## 2020-08-25 ENCOUNTER — HOSPITAL ENCOUNTER (INPATIENT)
Facility: HOSPITAL | Age: 70
LOS: 4 days | Discharge: HOME | End: 2020-08-29
Attending: INTERNAL MEDICINE | Admitting: HOSPITALIST

## 2020-08-25 ENCOUNTER — OFFICE VISIT (OUTPATIENT)
Dept: INTERNAL MEDICINE | Facility: CLINIC | Age: 70
End: 2020-08-25

## 2020-08-25 ENCOUNTER — APPOINTMENT (OUTPATIENT)
Dept: GENERAL RADIOLOGY | Facility: HOSPITAL | Age: 70
End: 2020-08-25

## 2020-08-25 DIAGNOSIS — R09.02 HYPOXEMIA: Primary | ICD-10-CM

## 2020-08-25 DIAGNOSIS — R00.0 TACHYCARDIA: ICD-10-CM

## 2020-08-25 PROBLEM — R06.02 SHORTNESS OF BREATH: Status: ACTIVE | Noted: 2020-08-25

## 2020-08-25 LAB
ALBUMIN SERPL-MCNC: 3.8 G/DL (ref 3.5–5.2)
ALBUMIN/GLOB SERPL: 1.2 G/DL
ALP SERPL-CCNC: 49 U/L (ref 39–117)
ALT SERPL W P-5'-P-CCNC: 37 U/L (ref 1–33)
ANION GAP SERPL CALCULATED.3IONS-SCNC: 10.7 MMOL/L (ref 5–15)
AST SERPL-CCNC: 27 U/L (ref 1–32)
B PARAPERT DNA SPEC QL NAA+PROBE: NOT DETECTED
B PERT DNA SPEC QL NAA+PROBE: NOT DETECTED
BILIRUB SERPL-MCNC: 0.3 MG/DL (ref 0–1.2)
BUN SERPL-MCNC: 6 MG/DL (ref 8–23)
BUN/CREAT SERPL: 9.7 (ref 7–25)
C PNEUM DNA NPH QL NAA+NON-PROBE: NOT DETECTED
CALCIUM SPEC-SCNC: 9.3 MG/DL (ref 8.6–10.5)
CHLORIDE SERPL-SCNC: 98 MMOL/L (ref 98–107)
CO2 SERPL-SCNC: 25.3 MMOL/L (ref 22–29)
CREAT SERPL-MCNC: 0.62 MG/DL (ref 0.57–1)
DEPRECATED RDW RBC AUTO: 44.1 FL (ref 37–54)
ERYTHROCYTE [DISTWIDTH] IN BLOOD BY AUTOMATED COUNT: 12.3 % (ref 12.3–15.4)
FLUAV H1 2009 PAND RNA NPH QL NAA+PROBE: NOT DETECTED
FLUAV H1 HA GENE NPH QL NAA+PROBE: NOT DETECTED
FLUAV H3 RNA NPH QL NAA+PROBE: NOT DETECTED
FLUAV SUBTYP SPEC NAA+PROBE: NOT DETECTED
FLUBV RNA ISLT QL NAA+PROBE: NOT DETECTED
GFR SERPL CREATININE-BSD FRML MDRD: 95 ML/MIN/1.73
GLOBULIN UR ELPH-MCNC: 3.2 GM/DL
GLUCOSE SERPL-MCNC: 117 MG/DL (ref 65–99)
HADV DNA SPEC NAA+PROBE: NOT DETECTED
HCOV 229E RNA SPEC QL NAA+PROBE: NOT DETECTED
HCOV HKU1 RNA SPEC QL NAA+PROBE: NOT DETECTED
HCOV NL63 RNA SPEC QL NAA+PROBE: NOT DETECTED
HCOV OC43 RNA SPEC QL NAA+PROBE: NOT DETECTED
HCT VFR BLD AUTO: 36.6 % (ref 34–46.6)
HGB BLD-MCNC: 11.8 G/DL (ref 12–15.9)
HMPV RNA NPH QL NAA+NON-PROBE: NOT DETECTED
HPIV1 RNA SPEC QL NAA+PROBE: NOT DETECTED
HPIV2 RNA SPEC QL NAA+PROBE: NOT DETECTED
HPIV3 RNA NPH QL NAA+PROBE: NOT DETECTED
HPIV4 P GENE NPH QL NAA+PROBE: NOT DETECTED
M PNEUMO IGG SER IA-ACNC: NOT DETECTED
MCH RBC QN AUTO: 31.2 PG (ref 26.6–33)
MCHC RBC AUTO-ENTMCNC: 32.2 G/DL (ref 31.5–35.7)
MCV RBC AUTO: 96.8 FL (ref 79–97)
NT-PROBNP SERPL-MCNC: 105.4 PG/ML (ref 0–900)
PLATELET # BLD AUTO: 395 10*3/MM3 (ref 140–450)
PMV BLD AUTO: 10.1 FL (ref 6–12)
POTASSIUM SERPL-SCNC: 4 MMOL/L (ref 3.5–5.2)
PROT SERPL-MCNC: 7 G/DL (ref 6–8.5)
RBC # BLD AUTO: 3.78 10*6/MM3 (ref 3.77–5.28)
RHINOVIRUS RNA SPEC NAA+PROBE: NOT DETECTED
RSV RNA NPH QL NAA+NON-PROBE: NOT DETECTED
SARS-COV-2 RNA NPH QL NAA+NON-PROBE: NOT DETECTED
SODIUM SERPL-SCNC: 134 MMOL/L (ref 136–145)
TROPONIN T SERPL-MCNC: <0.01 NG/ML (ref 0–0.03)
WBC # BLD AUTO: 13.39 10*3/MM3 (ref 3.4–10.8)

## 2020-08-25 PROCEDURE — G0378 HOSPITAL OBSERVATION PER HR: HCPCS

## 2020-08-25 PROCEDURE — 83880 ASSAY OF NATRIURETIC PEPTIDE: CPT | Performed by: INTERNAL MEDICINE

## 2020-08-25 PROCEDURE — 25010000002 AZITHROMYCIN PER 500 MG: Performed by: INTERNAL MEDICINE

## 2020-08-25 PROCEDURE — 71046 X-RAY EXAM CHEST 2 VIEWS: CPT

## 2020-08-25 PROCEDURE — 25010000002 METHYLPREDNISOLONE PER 40 MG: Performed by: INTERNAL MEDICINE

## 2020-08-25 PROCEDURE — 85027 COMPLETE CBC AUTOMATED: CPT | Performed by: INTERNAL MEDICINE

## 2020-08-25 PROCEDURE — 84484 ASSAY OF TROPONIN QUANT: CPT | Performed by: INTERNAL MEDICINE

## 2020-08-25 PROCEDURE — 94640 AIRWAY INHALATION TREATMENT: CPT

## 2020-08-25 PROCEDURE — 25010000002 CEFTRIAXONE PER 250 MG: Performed by: INTERNAL MEDICINE

## 2020-08-25 PROCEDURE — 80053 COMPREHEN METABOLIC PANEL: CPT | Performed by: INTERNAL MEDICINE

## 2020-08-25 PROCEDURE — 99213 OFFICE O/P EST LOW 20 MIN: CPT | Performed by: INTERNAL MEDICINE

## 2020-08-25 PROCEDURE — 93005 ELECTROCARDIOGRAM TRACING: CPT | Performed by: INTERNAL MEDICINE

## 2020-08-25 PROCEDURE — 0202U NFCT DS 22 TRGT SARS-COV-2: CPT | Performed by: INTERNAL MEDICINE

## 2020-08-25 PROCEDURE — 93010 ELECTROCARDIOGRAM REPORT: CPT | Performed by: INTERNAL MEDICINE

## 2020-08-25 RX ORDER — ALBUTEROL SULFATE 2.5 MG/3ML
2.5 SOLUTION RESPIRATORY (INHALATION) EVERY 4 HOURS PRN
Status: DISCONTINUED | OUTPATIENT
Start: 2020-08-25 | End: 2020-08-29 | Stop reason: HOSPADM

## 2020-08-25 RX ORDER — SODIUM CHLORIDE 0.9 % (FLUSH) 0.9 %
10 SYRINGE (ML) INJECTION EVERY 12 HOURS SCHEDULED
Status: DISCONTINUED | OUTPATIENT
Start: 2020-08-25 | End: 2020-08-29 | Stop reason: HOSPADM

## 2020-08-25 RX ORDER — MONTELUKAST SODIUM 10 MG/1
10 TABLET ORAL DAILY
Status: DISCONTINUED | OUTPATIENT
Start: 2020-08-26 | End: 2020-08-29 | Stop reason: HOSPADM

## 2020-08-25 RX ORDER — PANTOPRAZOLE SODIUM 40 MG/1
40 TABLET, DELAYED RELEASE ORAL EVERY MORNING
Status: DISCONTINUED | OUTPATIENT
Start: 2020-08-26 | End: 2020-08-29 | Stop reason: HOSPADM

## 2020-08-25 RX ORDER — ACETAMINOPHEN 160 MG/5ML
650 SOLUTION ORAL EVERY 4 HOURS PRN
Status: DISCONTINUED | OUTPATIENT
Start: 2020-08-25 | End: 2020-08-29 | Stop reason: HOSPADM

## 2020-08-25 RX ORDER — DAPSONE 25 MG/1
25 TABLET ORAL DAILY
Status: DISCONTINUED | OUTPATIENT
Start: 2020-08-26 | End: 2020-08-29 | Stop reason: HOSPADM

## 2020-08-25 RX ORDER — CEFTRIAXONE SODIUM 1 G/50ML
1 INJECTION, SOLUTION INTRAVENOUS EVERY 24 HOURS
Status: DISCONTINUED | OUTPATIENT
Start: 2020-08-25 | End: 2020-08-28

## 2020-08-25 RX ORDER — BENZONATATE 100 MG/1
200 CAPSULE ORAL 3 TIMES DAILY PRN
Status: DISCONTINUED | OUTPATIENT
Start: 2020-08-25 | End: 2020-08-29 | Stop reason: HOSPADM

## 2020-08-25 RX ORDER — GABAPENTIN 300 MG/1
300 CAPSULE ORAL 2 TIMES DAILY
Status: DISCONTINUED | OUTPATIENT
Start: 2020-08-25 | End: 2020-08-26

## 2020-08-25 RX ORDER — SPIRONOLACTONE 100 MG/1
100 TABLET, FILM COATED ORAL DAILY
Status: DISCONTINUED | OUTPATIENT
Start: 2020-08-26 | End: 2020-08-29 | Stop reason: HOSPADM

## 2020-08-25 RX ORDER — BUDESONIDE AND FORMOTEROL FUMARATE DIHYDRATE 160; 4.5 UG/1; UG/1
2 AEROSOL RESPIRATORY (INHALATION) EVERY 12 HOURS
Status: DISCONTINUED | OUTPATIENT
Start: 2020-08-25 | End: 2020-08-28

## 2020-08-25 RX ORDER — AMITRIPTYLINE HYDROCHLORIDE 10 MG/1
20 TABLET, FILM COATED ORAL NIGHTLY
Status: DISCONTINUED | OUTPATIENT
Start: 2020-08-25 | End: 2020-08-29 | Stop reason: HOSPADM

## 2020-08-25 RX ORDER — METHYLPREDNISOLONE SODIUM SUCCINATE 40 MG/ML
40 INJECTION, POWDER, LYOPHILIZED, FOR SOLUTION INTRAMUSCULAR; INTRAVENOUS EVERY 8 HOURS
Status: DISCONTINUED | OUTPATIENT
Start: 2020-08-25 | End: 2020-08-27

## 2020-08-25 RX ORDER — ACETAMINOPHEN 650 MG/1
650 SUPPOSITORY RECTAL EVERY 4 HOURS PRN
Status: DISCONTINUED | OUTPATIENT
Start: 2020-08-25 | End: 2020-08-29 | Stop reason: HOSPADM

## 2020-08-25 RX ORDER — SODIUM CHLORIDE 0.9 % (FLUSH) 0.9 %
10 SYRINGE (ML) INJECTION AS NEEDED
Status: DISCONTINUED | OUTPATIENT
Start: 2020-08-25 | End: 2020-08-29 | Stop reason: HOSPADM

## 2020-08-25 RX ORDER — L.ACID,PARA/B.BIFIDUM/S.THERM 8B CELL
1 CAPSULE ORAL DAILY
Status: DISCONTINUED | OUTPATIENT
Start: 2020-08-26 | End: 2020-08-29 | Stop reason: HOSPADM

## 2020-08-25 RX ORDER — VALACYCLOVIR HYDROCHLORIDE 500 MG/1
500 TABLET, FILM COATED ORAL DAILY
Status: DISCONTINUED | OUTPATIENT
Start: 2020-08-26 | End: 2020-08-29 | Stop reason: HOSPADM

## 2020-08-25 RX ORDER — CETIRIZINE HYDROCHLORIDE 10 MG/1
10 TABLET ORAL DAILY
Status: DISCONTINUED | OUTPATIENT
Start: 2020-08-26 | End: 2020-08-29 | Stop reason: HOSPADM

## 2020-08-25 RX ORDER — FLUTICASONE PROPIONATE 50 MCG
2 SPRAY, SUSPENSION (ML) NASAL DAILY
Status: DISCONTINUED | OUTPATIENT
Start: 2020-08-26 | End: 2020-08-29 | Stop reason: HOSPADM

## 2020-08-25 RX ORDER — MULTIPLE VITAMINS W/ MINERALS TAB 9MG-400MCG
1 TAB ORAL DAILY
Status: DISCONTINUED | OUTPATIENT
Start: 2020-08-26 | End: 2020-08-29 | Stop reason: HOSPADM

## 2020-08-25 RX ORDER — ACETAMINOPHEN 325 MG/1
650 TABLET ORAL EVERY 4 HOURS PRN
Status: DISCONTINUED | OUTPATIENT
Start: 2020-08-25 | End: 2020-08-29 | Stop reason: HOSPADM

## 2020-08-25 RX ORDER — ONDANSETRON 2 MG/ML
4 INJECTION INTRAMUSCULAR; INTRAVENOUS EVERY 6 HOURS PRN
Status: DISCONTINUED | OUTPATIENT
Start: 2020-08-25 | End: 2020-08-29 | Stop reason: HOSPADM

## 2020-08-25 RX ORDER — CITALOPRAM 20 MG/1
20 TABLET ORAL DAILY
Status: DISCONTINUED | OUTPATIENT
Start: 2020-08-26 | End: 2020-08-29 | Stop reason: HOSPADM

## 2020-08-25 RX ADMIN — AZITHROMYCIN MONOHYDRATE 500 MG: 500 INJECTION, POWDER, LYOPHILIZED, FOR SOLUTION INTRAVENOUS at 22:16

## 2020-08-25 RX ADMIN — SODIUM CHLORIDE, PRESERVATIVE FREE 10 ML: 5 INJECTION INTRAVENOUS at 22:12

## 2020-08-25 RX ADMIN — CALCIUM CARBONATE-VITAMIN D TAB 500 MG-200 UNIT 1000 MG: 500-200 TAB at 22:07

## 2020-08-25 RX ADMIN — CEFTRIAXONE SODIUM 1 G: 1 INJECTION, SOLUTION INTRAVENOUS at 22:16

## 2020-08-25 RX ADMIN — METHYLPREDNISOLONE SODIUM SUCCINATE 40 MG: 40 INJECTION, POWDER, LYOPHILIZED, FOR SOLUTION INTRAMUSCULAR; INTRAVENOUS at 22:09

## 2020-08-25 RX ADMIN — ACETAMINOPHEN 650 MG: 325 TABLET, FILM COATED ORAL at 22:08

## 2020-08-25 RX ADMIN — BUDESONIDE AND FORMOTEROL FUMARATE DIHYDRATE 2 PUFF: 160; 4.5 AEROSOL RESPIRATORY (INHALATION) at 23:33

## 2020-08-25 RX ADMIN — SODIUM CHLORIDE, PRESERVATIVE FREE 10 ML: 5 INJECTION INTRAVENOUS at 22:13

## 2020-08-25 RX ADMIN — GABAPENTIN 300 MG: 300 CAPSULE ORAL at 22:07

## 2020-08-25 RX ADMIN — AMITRIPTYLINE HYDROCHLORIDE 20 MG: 10 TABLET, FILM COATED ORAL at 23:17

## 2020-08-25 NOTE — PROGRESS NOTES
Assessment and Plan  Che was seen today for headache, abdominal pain and cough.    Diagnoses and all orders for this visit:    Hypoxemia  Comments:  Probable pneumonia given clinical presentation- will admit-d/w hospitalist    Tachycardia  Comments:  due to the above- will follow along in hospital.      F/U and Patient Instructions    No follow-ups on file.  There are no Patient Instructions on file for this visit.    Subjective    Che MCKEE Layman is a 70 y.o. female being seen in our office today for Headache; Abdominal Pain (left side); and Cough     History of the Present Illness  HPI   Pt called with 2-3 days of L lateral chest pain- worse with inspiration, n/d over the weekend with some low grade fevers. She has a cough but doesn't think it's much different than norm- there is significant MEYER.  Denies fevers, has been staying home- no known Covid exposures.   Patient History        Significant Past History  The following portions of the patient's history were reviewed and updated as appropriate:PMHroutine: Social history , Allergies, Current Medications, Active Problem List and Health Maintenance              Social History  She  reports that she quit smoking about 10 years ago. She has never used smokeless tobacco. She reports that she drinks about 2.0 standard drinks of alcohol per week. She reports that she does not use drugs.                         Review of Symptoms  Review of Systems   Constitution: Positive for decreased appetite. Negative for chills.   HENT: Negative.    Cardiovascular: Positive for chest pain and dyspnea on exertion. Negative for leg swelling.   Respiratory: Positive for cough, shortness of breath and wheezing.    Gastrointestinal: Diarrhea: late last week but not since.   Genitourinary: Negative.    Psychiatric/Behavioral: Negative.      Objective  Vital Signs         BP Readings from Last 1 Encounters:   08/25/20 150/71     Wt Readings from Last 3 Encounters:   08/26/20 81.6 kg  (179 lb 14.3 oz)   08/25/20 81.6 kg (180 lb)   07/27/20 82.6 kg (182 lb)   Body mass index is 28.19 kg/m².          Physical Exam   Physical Exam   Constitutional: She appears ill.   Cardiovascular: Regular rhythm. Tachycardia present.   Pulmonary/Chest: Accessory muscle usage present. Tachypnea noted. She has wheezes.   Musculoskeletal: She exhibits no edema.   Lymphadenopathy:     She has no cervical adenopathy.     Data Reviewed    Recent Results (from the past 2016 hour(s))   COVID-19,BIOTAP, NP/OP SWAB IN TRANSPORT MEDIA OR SALINE 24-36 HR TAT - Swab, Nasopharynx    Collection Time: 08/05/20 10:15 AM   Result Value Ref Range    Reference Lab Report      COVID19 Not Detected Not Detected - Ref. Range   Hemoglobin, Blood Gases    Collection Time: 08/07/20 12:19 PM   Result Value Ref Range    Site Left Finger     Hemoglobin, Blood Gas 13.7 12 - 18 g/dL   Respiratory Panel PCR w/COVID-19(SARS-CoV-2) YUN/KESHAV/STEPHANIA/[facility] In-House, NP Swab in UTM/VTM, 3-4 HR TAT - Swab, Nasopharynx    Collection Time: 08/25/20  5:22 PM   Result Value Ref Range    ADENOVIRUS, PCR Not Detected Not Detected    Coronavirus 229E Not Detected Not Detected    Coronavirus HKU1 Not Detected Not Detected    Coronavirus NL63 Not Detected Not Detected    Coronavirus OC43 Not Detected Not Detected    COVID19 Not Detected Not Detected - Ref. Range    Human Metapneumovirus Not Detected Not Detected    Human Rhinovirus/Enterovirus Not Detected Not Detected    Influenza A PCR Not Detected Not Detected    Influenza A H1 Not Detected Not Detected    Influenza A H1 2009 PCR Not Detected Not Detected    Influenza A H3 Not Detected Not Detected    Influenza B PCR Not Detected Not Detected    Parainfluenza Virus 1 Not Detected Not Detected    Parainfluenza Virus 2 Not Detected Not Detected    Parainfluenza Virus 3 Not Detected Not Detected    Parainfluenza Virus 4 Not Detected Not Detected    RSV, PCR Not Detected Not Detected    Bordetella pertussis pcr Not  Detected Not Detected    Bordetella parapertussis PCR Not Detected Not Detected    Chlamydophila pneumoniae PCR Not Detected Not Detected    Mycoplasma pneumo by PCR Not Detected Not Detected   Comprehensive Metabolic Panel    Collection Time: 08/25/20  8:44 PM   Result Value Ref Range    Glucose 117 (H) 65 - 99 mg/dL    BUN 6 (L) 8 - 23 mg/dL    Creatinine 0.62 0.57 - 1.00 mg/dL    Sodium 134 (L) 136 - 145 mmol/L    Potassium 4.0 3.5 - 5.2 mmol/L    Chloride 98 98 - 107 mmol/L    CO2 25.3 22.0 - 29.0 mmol/L    Calcium 9.3 8.6 - 10.5 mg/dL    Total Protein 7.0 6.0 - 8.5 g/dL    Albumin 3.80 3.50 - 5.20 g/dL    ALT (SGPT) 37 (H) 1 - 33 U/L    AST (SGOT) 27 1 - 32 U/L    Alkaline Phosphatase 49 39 - 117 U/L    Total Bilirubin 0.3 0.0 - 1.2 mg/dL    eGFR Non African Amer 95 >60 mL/min/1.73    Globulin 3.2 gm/dL    A/G Ratio 1.2 g/dL    BUN/Creatinine Ratio 9.7 7.0 - 25.0    Anion Gap 10.7 5.0 - 15.0 mmol/L   CBC (No Diff)    Collection Time: 08/25/20  8:44 PM   Result Value Ref Range    WBC 13.39 (H) 3.40 - 10.80 10*3/mm3    RBC 3.78 3.77 - 5.28 10*6/mm3    Hemoglobin 11.8 (L) 12.0 - 15.9 g/dL    Hematocrit 36.6 34.0 - 46.6 %    MCV 96.8 79.0 - 97.0 fL    MCH 31.2 26.6 - 33.0 pg    MCHC 32.2 31.5 - 35.7 g/dL    RDW 12.3 12.3 - 15.4 %    RDW-SD 44.1 37.0 - 54.0 fl    MPV 10.1 6.0 - 12.0 fL    Platelets 395 140 - 450 10*3/mm3   Troponin    Collection Time: 08/25/20  8:44 PM   Result Value Ref Range    Troponin T <0.010 0.000 - 0.030 ng/mL   BNP    Collection Time: 08/25/20  8:44 PM   Result Value Ref Range    proBNP 105.4 0.0 - 900.0 pg/mL

## 2020-08-26 ENCOUNTER — APPOINTMENT (OUTPATIENT)
Dept: CARDIOLOGY | Facility: HOSPITAL | Age: 70
End: 2020-08-26

## 2020-08-26 VITALS
HEIGHT: 67 IN | OXYGEN SATURATION: 84 % | TEMPERATURE: 96.8 F | HEART RATE: 120 BPM | WEIGHT: 180 LBS | BODY MASS INDEX: 28.25 KG/M2

## 2020-08-26 LAB
ANION GAP SERPL CALCULATED.3IONS-SCNC: 5.7 MMOL/L (ref 5–15)
AORTIC DIMENSIONLESS INDEX: 0.8 (DI)
BASOPHILS # BLD AUTO: 0.02 10*3/MM3 (ref 0–0.2)
BASOPHILS NFR BLD AUTO: 0.2 % (ref 0–1.5)
BH CV ECHO MEAS - ACS: 1.7 CM
BH CV ECHO MEAS - AO ARCH DIAM (PROXIMAL TRANS.): 2.6 CM
BH CV ECHO MEAS - AO MAX PG (FULL): 4.8 MMHG
BH CV ECHO MEAS - AO MAX PG: 10.5 MMHG
BH CV ECHO MEAS - AO MEAN PG (FULL): 2.6 MMHG
BH CV ECHO MEAS - AO MEAN PG: 5.5 MMHG
BH CV ECHO MEAS - AO ROOT AREA (BSA CORRECTED): 1.4
BH CV ECHO MEAS - AO ROOT AREA: 6.1 CM^2
BH CV ECHO MEAS - AO ROOT DIAM: 2.8 CM
BH CV ECHO MEAS - AO V2 MAX: 162.1 CM/SEC
BH CV ECHO MEAS - AO V2 MEAN: 109 CM/SEC
BH CV ECHO MEAS - AO V2 VTI: 34 CM
BH CV ECHO MEAS - AVA(I,A): 2.7 CM^2
BH CV ECHO MEAS - AVA(I,D): 2.7 CM^2
BH CV ECHO MEAS - AVA(V,A): 2.4 CM^2
BH CV ECHO MEAS - AVA(V,D): 2.4 CM^2
BH CV ECHO MEAS - BSA(HAYCOCK): 2 M^2
BH CV ECHO MEAS - BSA: 1.9 M^2
BH CV ECHO MEAS - BZI_BMI: 28.2 KILOGRAMS/M^2
BH CV ECHO MEAS - BZI_METRIC_HEIGHT: 170.2 CM
BH CV ECHO MEAS - BZI_METRIC_WEIGHT: 81.6 KG
BH CV ECHO MEAS - DESC AORTA: 1.8 CM
BH CV ECHO MEAS - EDV(CUBED): 97.4 ML
BH CV ECHO MEAS - EDV(MOD-SP2): 69 ML
BH CV ECHO MEAS - EDV(MOD-SP4): 74 ML
BH CV ECHO MEAS - EDV(TEICH): 97.4 ML
BH CV ECHO MEAS - EF(CUBED): 81.2 %
BH CV ECHO MEAS - EF(MOD-BP): 75.6 %
BH CV ECHO MEAS - EF(MOD-SP2): 75.4 %
BH CV ECHO MEAS - EF(MOD-SP4): 75.7 %
BH CV ECHO MEAS - EF(TEICH): 73.9 %
BH CV ECHO MEAS - ESV(CUBED): 18.3 ML
BH CV ECHO MEAS - ESV(MOD-SP2): 17 ML
BH CV ECHO MEAS - ESV(MOD-SP4): 18 ML
BH CV ECHO MEAS - ESV(TEICH): 25.4 ML
BH CV ECHO MEAS - FS: 42.7 %
BH CV ECHO MEAS - IVS/LVPW: 1
BH CV ECHO MEAS - IVSD: 0.76 CM
BH CV ECHO MEAS - LAT PEAK E' VEL: 8.9 CM/SEC
BH CV ECHO MEAS - LV DIASTOLIC VOL/BSA (35-75): 38.3 ML/M^2
BH CV ECHO MEAS - LV MASS(C)D: 110.1 GRAMS
BH CV ECHO MEAS - LV MASS(C)DI: 57 GRAMS/M^2
BH CV ECHO MEAS - LV MAX PG: 5.8 MMHG
BH CV ECHO MEAS - LV MEAN PG: 2.9 MMHG
BH CV ECHO MEAS - LV SYSTOLIC VOL/BSA (12-30): 9.3 ML/M^2
BH CV ECHO MEAS - LV V1 MAX: 120 CM/SEC
BH CV ECHO MEAS - LV V1 MEAN: 81 CM/SEC
BH CV ECHO MEAS - LV V1 VTI: 28 CM
BH CV ECHO MEAS - LVIDD: 4.6 CM
BH CV ECHO MEAS - LVIDS: 2.6 CM
BH CV ECHO MEAS - LVLD AP2: 7.8 CM
BH CV ECHO MEAS - LVLD AP4: 8 CM
BH CV ECHO MEAS - LVLS AP2: 6.4 CM
BH CV ECHO MEAS - LVLS AP4: 6.5 CM
BH CV ECHO MEAS - LVOT AREA (M): 3.1 CM^2
BH CV ECHO MEAS - LVOT AREA: 3.2 CM^2
BH CV ECHO MEAS - LVOT DIAM: 2 CM
BH CV ECHO MEAS - LVPWD: 0.75 CM
BH CV ECHO MEAS - MED PEAK E' VEL: 9.6 CM/SEC
BH CV ECHO MEAS - MV A DUR: 0.15 SEC
BH CV ECHO MEAS - MV A MAX VEL: 166.1 CM/SEC
BH CV ECHO MEAS - MV DEC SLOPE: 659.3 CM/SEC^2
BH CV ECHO MEAS - MV DEC TIME: 150 SEC
BH CV ECHO MEAS - MV E MAX VEL: 124 CM/SEC
BH CV ECHO MEAS - MV E/A: 0.75
BH CV ECHO MEAS - MV MAX PG: 11 MMHG
BH CV ECHO MEAS - MV MEAN PG: 4.3 MMHG
BH CV ECHO MEAS - MV P1/2T MAX VEL: 120.1 CM/SEC
BH CV ECHO MEAS - MV P1/2T: 53.4 MSEC
BH CV ECHO MEAS - MV V2 MAX: 165.6 CM/SEC
BH CV ECHO MEAS - MV V2 MEAN: 94.9 CM/SEC
BH CV ECHO MEAS - MV V2 VTI: 28.1 CM
BH CV ECHO MEAS - MVA P1/2T LCG: 1.8 CM^2
BH CV ECHO MEAS - MVA(P1/2T): 4.1 CM^2
BH CV ECHO MEAS - MVA(VTI): 3.2 CM^2
BH CV ECHO MEAS - PA ACC TIME: 0.12 SEC
BH CV ECHO MEAS - PA MAX PG (FULL): 1.3 MMHG
BH CV ECHO MEAS - PA MAX PG: 3.7 MMHG
BH CV ECHO MEAS - PA PR(ACCEL): 26.7 MMHG
BH CV ECHO MEAS - PA V2 MAX: 95.9 CM/SEC
BH CV ECHO MEAS - PULM A REVS DUR: 0.15 SEC
BH CV ECHO MEAS - PULM A REVS VEL: 49.9 CM/SEC
BH CV ECHO MEAS - PULM DIAS VEL: 46 CM/SEC
BH CV ECHO MEAS - PULM S/D: 1.7
BH CV ECHO MEAS - PULM SYS VEL: 78.1 CM/SEC
BH CV ECHO MEAS - PVA(V,A): 3.2 CM^2
BH CV ECHO MEAS - PVA(V,D): 3.2 CM^2
BH CV ECHO MEAS - QP/QS: 0.63
BH CV ECHO MEAS - RAP SYSTOLE: 3 MMHG
BH CV ECHO MEAS - RV MAX PG: 2.4 MMHG
BH CV ECHO MEAS - RV MEAN PG: 1.2 MMHG
BH CV ECHO MEAS - RV V1 MAX: 77.6 CM/SEC
BH CV ECHO MEAS - RV V1 MEAN: 51.6 CM/SEC
BH CV ECHO MEAS - RV V1 VTI: 14.3 CM
BH CV ECHO MEAS - RVOT AREA: 4 CM^2
BH CV ECHO MEAS - RVOT DIAM: 2.3 CM
BH CV ECHO MEAS - SI(AO): 106.4 ML/M^2
BH CV ECHO MEAS - SI(CUBED): 40.9 ML/M^2
BH CV ECHO MEAS - SI(LVOT): 46.9 ML/M^2
BH CV ECHO MEAS - SI(MOD-SP2): 26.9 ML/M^2
BH CV ECHO MEAS - SI(MOD-SP4): 29 ML/M^2
BH CV ECHO MEAS - SI(TEICH): 37.2 ML/M^2
BH CV ECHO MEAS - SV(AO): 205.7 ML
BH CV ECHO MEAS - SV(CUBED): 79.1 ML
BH CV ECHO MEAS - SV(LVOT): 90.7 ML
BH CV ECHO MEAS - SV(MOD-SP2): 52 ML
BH CV ECHO MEAS - SV(MOD-SP4): 56 ML
BH CV ECHO MEAS - SV(RVOT): 57.3 ML
BH CV ECHO MEAS - SV(TEICH): 71.9 ML
BH CV ECHO MEAS - TAPSE (>1.6): 2.6 CM2
BH CV ECHO MEASUREMENTS AVERAGE E/E' RATIO: 13.41
BH CV VAS BP LEFT ARM: NORMAL MMHG
BH CV XLRA - RV BASE: 2.8 CM
BH CV XLRA - RV LENGTH: 7.9 CM
BH CV XLRA - RV MID: 1.9 CM
BH CV XLRA - TDI S': 17.5 CM/SEC
BUN SERPL-MCNC: 8 MG/DL (ref 8–23)
BUN/CREAT SERPL: 11.6 (ref 7–25)
CALCIUM SPEC-SCNC: 9.2 MG/DL (ref 8.6–10.5)
CHLORIDE SERPL-SCNC: 101 MMOL/L (ref 98–107)
CO2 SERPL-SCNC: 28.3 MMOL/L (ref 22–29)
CREAT SERPL-MCNC: 0.69 MG/DL (ref 0.57–1)
DEPRECATED RDW RBC AUTO: 41.3 FL (ref 37–54)
EOSINOPHIL # BLD AUTO: 0 10*3/MM3 (ref 0–0.4)
EOSINOPHIL NFR BLD AUTO: 0 % (ref 0.3–6.2)
ERYTHROCYTE [DISTWIDTH] IN BLOOD BY AUTOMATED COUNT: 11.8 % (ref 12.3–15.4)
GFR SERPL CREATININE-BSD FRML MDRD: 84 ML/MIN/1.73
GLUCOSE BLDC GLUCOMTR-MCNC: 239 MG/DL (ref 70–130)
GLUCOSE SERPL-MCNC: 228 MG/DL (ref 65–99)
HCT VFR BLD AUTO: 36.2 % (ref 34–46.6)
HGB BLD-MCNC: 12.1 G/DL (ref 12–15.9)
IMM GRANULOCYTES # BLD AUTO: 0.08 10*3/MM3 (ref 0–0.05)
IMM GRANULOCYTES NFR BLD AUTO: 0.9 % (ref 0–0.5)
LEFT ATRIUM VOLUME INDEX: 19 ML/M2
LEFT ATRIUM VOLUME: 34 CM3
LV EF 2D ECHO EST: 76 %
LYMPHOCYTES # BLD AUTO: 1.03 10*3/MM3 (ref 0.7–3.1)
LYMPHOCYTES NFR BLD AUTO: 11.4 % (ref 19.6–45.3)
MAGNESIUM SERPL-MCNC: 2.2 MG/DL (ref 1.6–2.4)
MAXIMAL PREDICTED HEART RATE: 148 BPM
MCH RBC QN AUTO: 31.8 PG (ref 26.6–33)
MCHC RBC AUTO-ENTMCNC: 33.4 G/DL (ref 31.5–35.7)
MCV RBC AUTO: 95.3 FL (ref 79–97)
MONOCYTES # BLD AUTO: 0.33 10*3/MM3 (ref 0.1–0.9)
MONOCYTES NFR BLD AUTO: 3.7 % (ref 5–12)
NEUTROPHILS NFR BLD AUTO: 7.54 10*3/MM3 (ref 1.7–7)
NEUTROPHILS NFR BLD AUTO: 83.8 % (ref 42.7–76)
NRBC BLD AUTO-RTO: 0 /100 WBC (ref 0–0.2)
PLATELET # BLD AUTO: 392 10*3/MM3 (ref 140–450)
PMV BLD AUTO: 10.3 FL (ref 6–12)
POTASSIUM SERPL-SCNC: 4.5 MMOL/L (ref 3.5–5.2)
RBC # BLD AUTO: 3.8 10*6/MM3 (ref 3.77–5.28)
SODIUM SERPL-SCNC: 135 MMOL/L (ref 136–145)
STRESS TARGET HR: 126 BPM
WBC # BLD AUTO: 9 10*3/MM3 (ref 3.4–10.8)

## 2020-08-26 PROCEDURE — G0378 HOSPITAL OBSERVATION PER HR: HCPCS

## 2020-08-26 PROCEDURE — 97535 SELF CARE MNGMENT TRAINING: CPT

## 2020-08-26 PROCEDURE — 25010000002 CEFTRIAXONE PER 250 MG: Performed by: INTERNAL MEDICINE

## 2020-08-26 PROCEDURE — 82962 GLUCOSE BLOOD TEST: CPT

## 2020-08-26 PROCEDURE — 94799 UNLISTED PULMONARY SVC/PX: CPT

## 2020-08-26 PROCEDURE — 25010000002 METHYLPREDNISOLONE PER 40 MG: Performed by: INTERNAL MEDICINE

## 2020-08-26 PROCEDURE — 80048 BASIC METABOLIC PNL TOTAL CA: CPT | Performed by: INTERNAL MEDICINE

## 2020-08-26 PROCEDURE — 83735 ASSAY OF MAGNESIUM: CPT | Performed by: INTERNAL MEDICINE

## 2020-08-26 PROCEDURE — 25010000002 AZITHROMYCIN PER 500 MG: Performed by: INTERNAL MEDICINE

## 2020-08-26 PROCEDURE — 97166 OT EVAL MOD COMPLEX 45 MIN: CPT

## 2020-08-26 PROCEDURE — 93306 TTE W/DOPPLER COMPLETE: CPT

## 2020-08-26 PROCEDURE — 93306 TTE W/DOPPLER COMPLETE: CPT | Performed by: INTERNAL MEDICINE

## 2020-08-26 PROCEDURE — 85025 COMPLETE CBC W/AUTO DIFF WBC: CPT | Performed by: INTERNAL MEDICINE

## 2020-08-26 RX ORDER — GABAPENTIN 300 MG/1
600 CAPSULE ORAL NIGHTLY
Status: DISCONTINUED | OUTPATIENT
Start: 2020-08-26 | End: 2020-08-29 | Stop reason: HOSPADM

## 2020-08-26 RX ORDER — NICOTINE POLACRILEX 4 MG
15 LOZENGE BUCCAL
Status: DISCONTINUED | OUTPATIENT
Start: 2020-08-26 | End: 2020-08-29 | Stop reason: HOSPADM

## 2020-08-26 RX ORDER — DEXTROSE MONOHYDRATE 25 G/50ML
25 INJECTION, SOLUTION INTRAVENOUS
Status: DISCONTINUED | OUTPATIENT
Start: 2020-08-26 | End: 2020-08-29 | Stop reason: HOSPADM

## 2020-08-26 RX ADMIN — SODIUM CHLORIDE, PRESERVATIVE FREE 10 ML: 5 INJECTION INTRAVENOUS at 21:28

## 2020-08-26 RX ADMIN — MONTELUKAST SODIUM 10 MG: 10 TABLET, FILM COATED ORAL at 09:20

## 2020-08-26 RX ADMIN — METHYLPREDNISOLONE SODIUM SUCCINATE 40 MG: 40 INJECTION, POWDER, LYOPHILIZED, FOR SOLUTION INTRAMUSCULAR; INTRAVENOUS at 15:06

## 2020-08-26 RX ADMIN — VALACYCLOVIR 500 MG: 500 TABLET, FILM COATED ORAL at 09:19

## 2020-08-26 RX ADMIN — BUDESONIDE AND FORMOTEROL FUMARATE DIHYDRATE 2 PUFF: 160; 4.5 AEROSOL RESPIRATORY (INHALATION) at 21:52

## 2020-08-26 RX ADMIN — PANTOPRAZOLE SODIUM 40 MG: 40 TABLET, DELAYED RELEASE ORAL at 07:03

## 2020-08-26 RX ADMIN — SODIUM CHLORIDE, PRESERVATIVE FREE 10 ML: 5 INJECTION INTRAVENOUS at 09:15

## 2020-08-26 RX ADMIN — METHYLPREDNISOLONE SODIUM SUCCINATE 40 MG: 40 INJECTION, POWDER, LYOPHILIZED, FOR SOLUTION INTRAMUSCULAR; INTRAVENOUS at 21:24

## 2020-08-26 RX ADMIN — SPIRONOLACTONE 100 MG: 100 TABLET, FILM COATED ORAL at 09:19

## 2020-08-26 RX ADMIN — CALCIUM CARBONATE-VITAMIN D TAB 500 MG-200 UNIT 1000 MG: 500-200 TAB at 09:19

## 2020-08-26 RX ADMIN — Medication 1 CAPSULE: at 09:20

## 2020-08-26 RX ADMIN — AZITHROMYCIN MONOHYDRATE 500 MG: 500 INJECTION, POWDER, LYOPHILIZED, FOR SOLUTION INTRAVENOUS at 21:24

## 2020-08-26 RX ADMIN — BENZONATATE 200 MG: 100 CAPSULE ORAL at 21:26

## 2020-08-26 RX ADMIN — BENZONATATE 200 MG: 100 CAPSULE ORAL at 00:33

## 2020-08-26 RX ADMIN — MULTIPLE VITAMINS W/ MINERALS TAB 1 TABLET: TAB at 09:20

## 2020-08-26 RX ADMIN — METHYLPREDNISOLONE SODIUM SUCCINATE 40 MG: 40 INJECTION, POWDER, LYOPHILIZED, FOR SOLUTION INTRAMUSCULAR; INTRAVENOUS at 05:40

## 2020-08-26 RX ADMIN — GABAPENTIN 600 MG: 300 CAPSULE ORAL at 21:25

## 2020-08-26 RX ADMIN — METOPROLOL TARTRATE 25 MG: 25 TABLET, FILM COATED ORAL at 21:40

## 2020-08-26 RX ADMIN — CEFTRIAXONE SODIUM 1 G: 1 INJECTION, SOLUTION INTRAVENOUS at 21:24

## 2020-08-26 RX ADMIN — CITALOPRAM 20 MG: 20 TABLET, FILM COATED ORAL at 09:20

## 2020-08-26 RX ADMIN — AMITRIPTYLINE HYDROCHLORIDE 20 MG: 10 TABLET, FILM COATED ORAL at 21:25

## 2020-08-26 RX ADMIN — CETIRIZINE HYDROCHLORIDE 10 MG: 10 TABLET ORAL at 09:20

## 2020-08-26 RX ADMIN — BUDESONIDE AND FORMOTEROL FUMARATE DIHYDRATE 2 PUFF: 160; 4.5 AEROSOL RESPIRATORY (INHALATION) at 07:30

## 2020-08-27 ENCOUNTER — APPOINTMENT (OUTPATIENT)
Dept: CT IMAGING | Facility: HOSPITAL | Age: 70
End: 2020-08-27

## 2020-08-27 ENCOUNTER — APPOINTMENT (OUTPATIENT)
Dept: CARDIOLOGY | Facility: HOSPITAL | Age: 70
End: 2020-08-27

## 2020-08-27 PROBLEM — R79.89 POSITIVE D DIMER: Status: ACTIVE | Noted: 2020-08-27

## 2020-08-27 PROBLEM — J90 PLEURAL EFFUSION: Status: ACTIVE | Noted: 2020-08-27

## 2020-08-27 PROBLEM — J18.9 CAP (COMMUNITY ACQUIRED PNEUMONIA): Status: ACTIVE | Noted: 2020-08-27

## 2020-08-27 PROBLEM — J44.1 COPD WITH ACUTE EXACERBATION: Status: ACTIVE | Noted: 2020-08-27

## 2020-08-27 LAB
ANION GAP SERPL CALCULATED.3IONS-SCNC: 9 MMOL/L (ref 5–15)
BH CV LOWER VASCULAR LEFT COMMON FEMORAL AUGMENT: NORMAL
BH CV LOWER VASCULAR LEFT COMMON FEMORAL COMPETENT: NORMAL
BH CV LOWER VASCULAR LEFT COMMON FEMORAL COMPRESS: NORMAL
BH CV LOWER VASCULAR LEFT COMMON FEMORAL PHASIC: NORMAL
BH CV LOWER VASCULAR LEFT COMMON FEMORAL SPONT: NORMAL
BH CV LOWER VASCULAR LEFT DISTAL FEMORAL COMPRESS: NORMAL
BH CV LOWER VASCULAR LEFT GASTRONEMIUS COMPRESS: NORMAL
BH CV LOWER VASCULAR LEFT GREATER SAPH AK COMPRESS: NORMAL
BH CV LOWER VASCULAR LEFT GREATER SAPH BK COMPRESS: NORMAL
BH CV LOWER VASCULAR LEFT LESSER SAPH COMPRESS: NORMAL
BH CV LOWER VASCULAR LEFT MID FEMORAL AUGMENT: NORMAL
BH CV LOWER VASCULAR LEFT MID FEMORAL COMPETENT: NORMAL
BH CV LOWER VASCULAR LEFT MID FEMORAL COMPRESS: NORMAL
BH CV LOWER VASCULAR LEFT MID FEMORAL PHASIC: NORMAL
BH CV LOWER VASCULAR LEFT MID FEMORAL SPONT: NORMAL
BH CV LOWER VASCULAR LEFT PERONEAL COMPRESS: NORMAL
BH CV LOWER VASCULAR LEFT POPLITEAL AUGMENT: NORMAL
BH CV LOWER VASCULAR LEFT POPLITEAL COMPETENT: NORMAL
BH CV LOWER VASCULAR LEFT POPLITEAL COMPRESS: NORMAL
BH CV LOWER VASCULAR LEFT POPLITEAL PHASIC: NORMAL
BH CV LOWER VASCULAR LEFT POPLITEAL SPONT: NORMAL
BH CV LOWER VASCULAR LEFT POSTERIOR TIBIAL COMPRESS: NORMAL
BH CV LOWER VASCULAR LEFT PROFUNDA FEMORAL COMPRESS: NORMAL
BH CV LOWER VASCULAR LEFT PROXIMAL FEMORAL COMPRESS: NORMAL
BH CV LOWER VASCULAR LEFT SAPHENOFEMORAL JUNCTION COMPRESS: NORMAL
BH CV LOWER VASCULAR RIGHT COMMON FEMORAL AUGMENT: NORMAL
BH CV LOWER VASCULAR RIGHT COMMON FEMORAL COMPETENT: NORMAL
BH CV LOWER VASCULAR RIGHT COMMON FEMORAL COMPRESS: NORMAL
BH CV LOWER VASCULAR RIGHT COMMON FEMORAL PHASIC: NORMAL
BH CV LOWER VASCULAR RIGHT COMMON FEMORAL SPONT: NORMAL
BH CV LOWER VASCULAR RIGHT DISTAL FEMORAL COMPRESS: NORMAL
BH CV LOWER VASCULAR RIGHT GASTRONEMIUS COMPRESS: NORMAL
BH CV LOWER VASCULAR RIGHT GREATER SAPH AK COMPRESS: NORMAL
BH CV LOWER VASCULAR RIGHT GREATER SAPH BK COMPRESS: NORMAL
BH CV LOWER VASCULAR RIGHT LESSER SAPH COMPRESS: NORMAL
BH CV LOWER VASCULAR RIGHT MID FEMORAL AUGMENT: NORMAL
BH CV LOWER VASCULAR RIGHT MID FEMORAL COMPETENT: NORMAL
BH CV LOWER VASCULAR RIGHT MID FEMORAL COMPRESS: NORMAL
BH CV LOWER VASCULAR RIGHT MID FEMORAL PHASIC: NORMAL
BH CV LOWER VASCULAR RIGHT MID FEMORAL SPONT: NORMAL
BH CV LOWER VASCULAR RIGHT PERONEAL COMPRESS: NORMAL
BH CV LOWER VASCULAR RIGHT POPLITEAL AUGMENT: NORMAL
BH CV LOWER VASCULAR RIGHT POPLITEAL COMPETENT: NORMAL
BH CV LOWER VASCULAR RIGHT POPLITEAL COMPRESS: NORMAL
BH CV LOWER VASCULAR RIGHT POPLITEAL PHASIC: NORMAL
BH CV LOWER VASCULAR RIGHT POPLITEAL SPONT: NORMAL
BH CV LOWER VASCULAR RIGHT POSTERIOR TIBIAL COMPRESS: NORMAL
BH CV LOWER VASCULAR RIGHT PROFUNDA FEMORAL COMPRESS: NORMAL
BH CV LOWER VASCULAR RIGHT PROXIMAL FEMORAL COMPRESS: NORMAL
BH CV LOWER VASCULAR RIGHT SAPHENOFEMORAL JUNCTION COMPRESS: NORMAL
BUN SERPL-MCNC: 13 MG/DL (ref 8–23)
BUN/CREAT SERPL: 16.9 (ref 7–25)
CALCIUM SPEC-SCNC: 9.7 MG/DL (ref 8.6–10.5)
CHLORIDE SERPL-SCNC: 99 MMOL/L (ref 98–107)
CO2 SERPL-SCNC: 27 MMOL/L (ref 22–29)
CREAT SERPL-MCNC: 0.77 MG/DL (ref 0.57–1)
D DIMER PPP FEU-MCNC: 2.35 MCGFEU/ML (ref 0–0.49)
DEPRECATED RDW RBC AUTO: 38.8 FL (ref 37–54)
ERYTHROCYTE [DISTWIDTH] IN BLOOD BY AUTOMATED COUNT: 11.8 % (ref 12.3–15.4)
GFR SERPL CREATININE-BSD FRML MDRD: 74 ML/MIN/1.73
GLUCOSE BLDC GLUCOMTR-MCNC: 187 MG/DL (ref 70–130)
GLUCOSE BLDC GLUCOMTR-MCNC: 201 MG/DL (ref 70–130)
GLUCOSE BLDC GLUCOMTR-MCNC: 229 MG/DL (ref 70–130)
GLUCOSE BLDC GLUCOMTR-MCNC: 272 MG/DL (ref 70–130)
GLUCOSE SERPL-MCNC: 215 MG/DL (ref 65–99)
HBA1C MFR BLD: 6.4 % (ref 4.8–5.6)
HCT VFR BLD AUTO: 34.8 % (ref 34–46.6)
HGB BLD-MCNC: 12 G/DL (ref 12–15.9)
MAGNESIUM SERPL-MCNC: 2.3 MG/DL (ref 1.6–2.4)
MCH RBC QN AUTO: 31.5 PG (ref 26.6–33)
MCHC RBC AUTO-ENTMCNC: 34.5 G/DL (ref 31.5–35.7)
MCV RBC AUTO: 91.3 FL (ref 79–97)
PLATELET # BLD AUTO: 507 10*3/MM3 (ref 140–450)
PMV BLD AUTO: 9.9 FL (ref 6–12)
POTASSIUM SERPL-SCNC: 4 MMOL/L (ref 3.5–5.2)
PROCALCITONIN SERPL-MCNC: 0.07 NG/ML (ref 0–0.25)
RBC # BLD AUTO: 3.81 10*6/MM3 (ref 3.77–5.28)
SODIUM SERPL-SCNC: 135 MMOL/L (ref 136–145)
WBC # BLD AUTO: 14.75 10*3/MM3 (ref 3.4–10.8)

## 2020-08-27 PROCEDURE — 82962 GLUCOSE BLOOD TEST: CPT

## 2020-08-27 PROCEDURE — 85379 FIBRIN DEGRADATION QUANT: CPT | Performed by: HOSPITALIST

## 2020-08-27 PROCEDURE — 93970 EXTREMITY STUDY: CPT

## 2020-08-27 PROCEDURE — 84145 PROCALCITONIN (PCT): CPT | Performed by: INTERNAL MEDICINE

## 2020-08-27 PROCEDURE — 94618 PULMONARY STRESS TESTING: CPT

## 2020-08-27 PROCEDURE — 94799 UNLISTED PULMONARY SVC/PX: CPT

## 2020-08-27 PROCEDURE — 25010000002 METHYLPREDNISOLONE PER 40 MG: Performed by: INTERNAL MEDICINE

## 2020-08-27 PROCEDURE — 83735 ASSAY OF MAGNESIUM: CPT | Performed by: INTERNAL MEDICINE

## 2020-08-27 PROCEDURE — G0378 HOSPITAL OBSERVATION PER HR: HCPCS

## 2020-08-27 PROCEDURE — 63710000001 INSULIN LISPRO (HUMAN) PER 5 UNITS: Performed by: INTERNAL MEDICINE

## 2020-08-27 PROCEDURE — 25010000002 AZITHROMYCIN PER 500 MG: Performed by: INTERNAL MEDICINE

## 2020-08-27 PROCEDURE — 25010000002 CEFTRIAXONE PER 250 MG: Performed by: INTERNAL MEDICINE

## 2020-08-27 PROCEDURE — 0 IOPAMIDOL PER 1 ML: Performed by: HOSPITALIST

## 2020-08-27 PROCEDURE — 85027 COMPLETE CBC AUTOMATED: CPT | Performed by: INTERNAL MEDICINE

## 2020-08-27 PROCEDURE — 80048 BASIC METABOLIC PNL TOTAL CA: CPT | Performed by: INTERNAL MEDICINE

## 2020-08-27 PROCEDURE — 83036 HEMOGLOBIN GLYCOSYLATED A1C: CPT | Performed by: INTERNAL MEDICINE

## 2020-08-27 PROCEDURE — 71275 CT ANGIOGRAPHY CHEST: CPT

## 2020-08-27 RX ORDER — PREDNISONE 20 MG/1
40 TABLET ORAL
Status: DISCONTINUED | OUTPATIENT
Start: 2020-08-28 | End: 2020-08-29 | Stop reason: HOSPADM

## 2020-08-27 RX ADMIN — BENZONATATE 200 MG: 100 CAPSULE ORAL at 09:16

## 2020-08-27 RX ADMIN — AZITHROMYCIN MONOHYDRATE 500 MG: 500 INJECTION, POWDER, LYOPHILIZED, FOR SOLUTION INTRAVENOUS at 20:30

## 2020-08-27 RX ADMIN — PANTOPRAZOLE SODIUM 40 MG: 40 TABLET, DELAYED RELEASE ORAL at 09:17

## 2020-08-27 RX ADMIN — METHYLPREDNISOLONE SODIUM SUCCINATE 40 MG: 40 INJECTION, POWDER, LYOPHILIZED, FOR SOLUTION INTRAMUSCULAR; INTRAVENOUS at 04:31

## 2020-08-27 RX ADMIN — BUDESONIDE AND FORMOTEROL FUMARATE DIHYDRATE 2 PUFF: 160; 4.5 AEROSOL RESPIRATORY (INHALATION) at 08:51

## 2020-08-27 RX ADMIN — MULTIPLE VITAMINS W/ MINERALS TAB 1 TABLET: TAB at 09:17

## 2020-08-27 RX ADMIN — Medication 1 CAPSULE: at 09:17

## 2020-08-27 RX ADMIN — IOPAMIDOL 95 ML: 755 INJECTION, SOLUTION INTRAVENOUS at 20:30

## 2020-08-27 RX ADMIN — BUDESONIDE AND FORMOTEROL FUMARATE DIHYDRATE 2 PUFF: 160; 4.5 AEROSOL RESPIRATORY (INHALATION) at 21:24

## 2020-08-27 RX ADMIN — VALACYCLOVIR 500 MG: 500 TABLET, FILM COATED ORAL at 09:17

## 2020-08-27 RX ADMIN — CETIRIZINE HYDROCHLORIDE 10 MG: 10 TABLET ORAL at 09:20

## 2020-08-27 RX ADMIN — CALCIUM CARBONATE-VITAMIN D TAB 500 MG-200 UNIT 1000 MG: 500-200 TAB at 09:17

## 2020-08-27 RX ADMIN — ACETAMINOPHEN 650 MG: 325 TABLET, FILM COATED ORAL at 17:26

## 2020-08-27 RX ADMIN — CEFTRIAXONE SODIUM 1 G: 1 INJECTION, SOLUTION INTRAVENOUS at 20:30

## 2020-08-27 RX ADMIN — METOPROLOL TARTRATE 25 MG: 25 TABLET, FILM COATED ORAL at 20:27

## 2020-08-27 RX ADMIN — MONTELUKAST SODIUM 10 MG: 10 TABLET, FILM COATED ORAL at 09:17

## 2020-08-27 RX ADMIN — AMITRIPTYLINE HYDROCHLORIDE 20 MG: 10 TABLET, FILM COATED ORAL at 20:26

## 2020-08-27 RX ADMIN — CITALOPRAM 20 MG: 20 TABLET, FILM COATED ORAL at 09:17

## 2020-08-27 RX ADMIN — BENZONATATE 200 MG: 100 CAPSULE ORAL at 22:56

## 2020-08-27 RX ADMIN — FLUTICASONE PROPIONATE 2 SPRAY: 50 SPRAY, METERED NASAL at 09:18

## 2020-08-27 RX ADMIN — METHYLPREDNISOLONE SODIUM SUCCINATE 40 MG: 40 INJECTION, POWDER, LYOPHILIZED, FOR SOLUTION INTRAMUSCULAR; INTRAVENOUS at 12:30

## 2020-08-27 RX ADMIN — GABAPENTIN 600 MG: 300 CAPSULE ORAL at 20:28

## 2020-08-27 RX ADMIN — SODIUM CHLORIDE, PRESERVATIVE FREE 10 ML: 5 INJECTION INTRAVENOUS at 20:31

## 2020-08-27 RX ADMIN — SPIRONOLACTONE 100 MG: 100 TABLET, FILM COATED ORAL at 09:17

## 2020-08-28 ENCOUNTER — APPOINTMENT (OUTPATIENT)
Dept: ULTRASOUND IMAGING | Facility: HOSPITAL | Age: 70
End: 2020-08-28

## 2020-08-28 LAB
ALBUMIN FLD-MCNC: 2.1 G/DL
APPEARANCE FLD: ABNORMAL
COLOR FLD: ABNORMAL
GLUCOSE BLDC GLUCOMTR-MCNC: 105 MG/DL (ref 70–130)
GLUCOSE BLDC GLUCOMTR-MCNC: 151 MG/DL (ref 70–130)
GLUCOSE BLDC GLUCOMTR-MCNC: 171 MG/DL (ref 70–130)
GLUCOSE BLDC GLUCOMTR-MCNC: 279 MG/DL (ref 70–130)
GLUCOSE FLD-MCNC: 123 MG/DL
LDH FLD-CCNC: 351 U/L
LYMPHOCYTES NFR FLD MANUAL: 58 %
METHOD: ABNORMAL
MONOS+MACROS NFR FLD: 30 %
NEUTROPHILS NFR FLD MANUAL: 12 %
NUC CELL # FLD: 3786 /MM3
PROT FLD-MCNC: 3.6 G/DL
RBC # FLD AUTO: ABNORMAL /MM3

## 2020-08-28 PROCEDURE — 63710000001 PREDNISONE PER 1 MG: Performed by: INTERNAL MEDICINE

## 2020-08-28 PROCEDURE — 94799 UNLISTED PULMONARY SVC/PX: CPT

## 2020-08-28 PROCEDURE — 76942 ECHO GUIDE FOR BIOPSY: CPT

## 2020-08-28 PROCEDURE — 25010000003 LIDOCAINE 1 % SOLUTION: Performed by: RADIOLOGY

## 2020-08-28 PROCEDURE — 82042 OTHER SOURCE ALBUMIN QUAN EA: CPT | Performed by: INTERNAL MEDICINE

## 2020-08-28 PROCEDURE — 88185 FLOWCYTOMETRY/TC ADD-ON: CPT

## 2020-08-28 PROCEDURE — 88184 FLOWCYTOMETRY/ TC 1 MARKER: CPT

## 2020-08-28 PROCEDURE — 0W9B3ZX DRAINAGE OF LEFT PLEURAL CAVITY, PERCUTANEOUS APPROACH, DIAGNOSTIC: ICD-10-PCS | Performed by: RADIOLOGY

## 2020-08-28 PROCEDURE — 84157 ASSAY OF PROTEIN OTHER: CPT | Performed by: INTERNAL MEDICINE

## 2020-08-28 PROCEDURE — 83615 LACTATE (LD) (LDH) ENZYME: CPT | Performed by: INTERNAL MEDICINE

## 2020-08-28 PROCEDURE — 88305 TISSUE EXAM BY PATHOLOGIST: CPT | Performed by: INTERNAL MEDICINE

## 2020-08-28 PROCEDURE — 94640 AIRWAY INHALATION TREATMENT: CPT

## 2020-08-28 PROCEDURE — 82962 GLUCOSE BLOOD TEST: CPT

## 2020-08-28 PROCEDURE — 63710000001 INSULIN LISPRO (HUMAN) PER 5 UNITS: Performed by: INTERNAL MEDICINE

## 2020-08-28 PROCEDURE — 88300 SURGICAL PATH GROSS: CPT | Performed by: INTERNAL MEDICINE

## 2020-08-28 PROCEDURE — 89051 BODY FLUID CELL COUNT: CPT | Performed by: INTERNAL MEDICINE

## 2020-08-28 PROCEDURE — 87205 SMEAR GRAM STAIN: CPT | Performed by: INTERNAL MEDICINE

## 2020-08-28 PROCEDURE — 87070 CULTURE OTHR SPECIMN AEROBIC: CPT | Performed by: INTERNAL MEDICINE

## 2020-08-28 PROCEDURE — 87015 SPECIMEN INFECT AGNT CONCNTJ: CPT | Performed by: INTERNAL MEDICINE

## 2020-08-28 PROCEDURE — 88112 CYTOPATH CELL ENHANCE TECH: CPT | Performed by: INTERNAL MEDICINE

## 2020-08-28 PROCEDURE — 82945 GLUCOSE OTHER FLUID: CPT | Performed by: INTERNAL MEDICINE

## 2020-08-28 RX ORDER — IPRATROPIUM BROMIDE AND ALBUTEROL SULFATE 2.5; .5 MG/3ML; MG/3ML
3 SOLUTION RESPIRATORY (INHALATION)
Status: DISCONTINUED | OUTPATIENT
Start: 2020-08-28 | End: 2020-08-29 | Stop reason: HOSPADM

## 2020-08-28 RX ORDER — LIDOCAINE HYDROCHLORIDE 10 MG/ML
20 INJECTION, SOLUTION INFILTRATION; PERINEURAL ONCE
Status: COMPLETED | OUTPATIENT
Start: 2020-08-28 | End: 2020-08-28

## 2020-08-28 RX ORDER — CEFDINIR 300 MG/1
300 CAPSULE ORAL EVERY 12 HOURS SCHEDULED
Status: DISCONTINUED | OUTPATIENT
Start: 2020-08-28 | End: 2020-08-29 | Stop reason: HOSPADM

## 2020-08-28 RX ADMIN — LIDOCAINE HYDROCHLORIDE 3 ML: 10 INJECTION, SOLUTION INFILTRATION; PERINEURAL at 15:33

## 2020-08-28 RX ADMIN — MULTIPLE VITAMINS W/ MINERALS TAB 1 TABLET: TAB at 13:00

## 2020-08-28 RX ADMIN — METOPROLOL TARTRATE 25 MG: 25 TABLET, FILM COATED ORAL at 20:38

## 2020-08-28 RX ADMIN — BENZONATATE 200 MG: 100 CAPSULE ORAL at 22:09

## 2020-08-28 RX ADMIN — FLUTICASONE PROPIONATE 2 SPRAY: 50 SPRAY, METERED NASAL at 09:00

## 2020-08-28 RX ADMIN — CEFDINIR 300 MG: 300 CAPSULE ORAL at 20:39

## 2020-08-28 RX ADMIN — SODIUM CHLORIDE, PRESERVATIVE FREE 10 ML: 5 INJECTION INTRAVENOUS at 09:00

## 2020-08-28 RX ADMIN — IPRATROPIUM BROMIDE AND ALBUTEROL SULFATE 3 ML: 2.5; .5 SOLUTION RESPIRATORY (INHALATION) at 13:32

## 2020-08-28 RX ADMIN — AMITRIPTYLINE HYDROCHLORIDE 20 MG: 10 TABLET, FILM COATED ORAL at 20:38

## 2020-08-28 RX ADMIN — CALCIUM CARBONATE-VITAMIN D TAB 500 MG-200 UNIT 1000 MG: 500-200 TAB at 13:02

## 2020-08-28 RX ADMIN — MONTELUKAST SODIUM 10 MG: 10 TABLET, FILM COATED ORAL at 13:00

## 2020-08-28 RX ADMIN — INSULIN LISPRO 2 UNITS: 100 INJECTION, SOLUTION INTRAVENOUS; SUBCUTANEOUS at 17:09

## 2020-08-28 RX ADMIN — IPRATROPIUM BROMIDE AND ALBUTEROL SULFATE 3 ML: 2.5; .5 SOLUTION RESPIRATORY (INHALATION) at 07:36

## 2020-08-28 RX ADMIN — Medication 1 CAPSULE: at 13:01

## 2020-08-28 RX ADMIN — PREDNISONE 40 MG: 20 TABLET ORAL at 13:00

## 2020-08-28 RX ADMIN — IPRATROPIUM BROMIDE AND ALBUTEROL SULFATE 3 ML: 2.5; .5 SOLUTION RESPIRATORY (INHALATION) at 16:26

## 2020-08-28 RX ADMIN — SODIUM CHLORIDE, PRESERVATIVE FREE 10 ML: 5 INJECTION INTRAVENOUS at 20:39

## 2020-08-28 RX ADMIN — VALACYCLOVIR 500 MG: 500 TABLET, FILM COATED ORAL at 13:01

## 2020-08-28 RX ADMIN — CITALOPRAM 20 MG: 20 TABLET, FILM COATED ORAL at 13:01

## 2020-08-28 RX ADMIN — PANTOPRAZOLE SODIUM 40 MG: 40 TABLET, DELAYED RELEASE ORAL at 06:45

## 2020-08-28 RX ADMIN — CETIRIZINE HYDROCHLORIDE 10 MG: 10 TABLET ORAL at 13:01

## 2020-08-28 RX ADMIN — GABAPENTIN 600 MG: 300 CAPSULE ORAL at 20:38

## 2020-08-28 RX ADMIN — SPIRONOLACTONE 100 MG: 100 TABLET, FILM COATED ORAL at 12:59

## 2020-08-28 RX ADMIN — IPRATROPIUM BROMIDE AND ALBUTEROL SULFATE 3 ML: 2.5; .5 SOLUTION RESPIRATORY (INHALATION) at 19:13

## 2020-08-29 ENCOUNTER — NURSE TRIAGE (OUTPATIENT)
Dept: CALL CENTER | Facility: HOSPITAL | Age: 70
End: 2020-08-29

## 2020-08-29 ENCOUNTER — READMISSION MANAGEMENT (OUTPATIENT)
Dept: CALL CENTER | Facility: HOSPITAL | Age: 70
End: 2020-08-29

## 2020-08-29 VITALS
RESPIRATION RATE: 16 BRPM | HEIGHT: 67 IN | BODY MASS INDEX: 28.25 KG/M2 | SYSTOLIC BLOOD PRESSURE: 156 MMHG | DIASTOLIC BLOOD PRESSURE: 85 MMHG | TEMPERATURE: 98.6 F | WEIGHT: 180 LBS | OXYGEN SATURATION: 98 % | HEART RATE: 65 BPM

## 2020-08-29 LAB
GLUCOSE BLDC GLUCOMTR-MCNC: 115 MG/DL (ref 70–130)
GLUCOSE BLDC GLUCOMTR-MCNC: 135 MG/DL (ref 70–130)

## 2020-08-29 PROCEDURE — 63710000001 PREDNISONE PER 1 MG: Performed by: INTERNAL MEDICINE

## 2020-08-29 PROCEDURE — 94799 UNLISTED PULMONARY SVC/PX: CPT

## 2020-08-29 PROCEDURE — 82962 GLUCOSE BLOOD TEST: CPT

## 2020-08-29 RX ORDER — PREDNISONE 10 MG/1
TABLET ORAL
Qty: 10 TABLET | Refills: 0 | Status: SHIPPED | OUTPATIENT
Start: 2020-08-30 | End: 2020-11-23

## 2020-08-29 RX ORDER — CEFDINIR 300 MG/1
300 CAPSULE ORAL EVERY 12 HOURS SCHEDULED
Qty: 8 CAPSULE | Refills: 0 | Status: SHIPPED | OUTPATIENT
Start: 2020-08-29 | End: 2020-09-02

## 2020-08-29 RX ADMIN — CEFDINIR 300 MG: 300 CAPSULE ORAL at 08:50

## 2020-08-29 RX ADMIN — PREDNISONE 40 MG: 20 TABLET ORAL at 08:49

## 2020-08-29 RX ADMIN — CETIRIZINE HYDROCHLORIDE 10 MG: 10 TABLET ORAL at 08:50

## 2020-08-29 RX ADMIN — PANTOPRAZOLE SODIUM 40 MG: 40 TABLET, DELAYED RELEASE ORAL at 08:50

## 2020-08-29 RX ADMIN — MULTIPLE VITAMINS W/ MINERALS TAB 1 TABLET: TAB at 08:50

## 2020-08-29 RX ADMIN — FLUTICASONE PROPIONATE 2 SPRAY: 50 SPRAY, METERED NASAL at 08:48

## 2020-08-29 RX ADMIN — Medication 1 CAPSULE: at 08:50

## 2020-08-29 RX ADMIN — IPRATROPIUM BROMIDE AND ALBUTEROL SULFATE 3 ML: 2.5; .5 SOLUTION RESPIRATORY (INHALATION) at 08:15

## 2020-08-29 RX ADMIN — MONTELUKAST SODIUM 10 MG: 10 TABLET, FILM COATED ORAL at 08:50

## 2020-08-29 RX ADMIN — CALCIUM CARBONATE-VITAMIN D TAB 500 MG-200 UNIT 1000 MG: 500-200 TAB at 08:50

## 2020-08-29 RX ADMIN — CITALOPRAM 20 MG: 20 TABLET, FILM COATED ORAL at 08:50

## 2020-08-29 RX ADMIN — VALACYCLOVIR 500 MG: 500 TABLET, FILM COATED ORAL at 08:50

## 2020-08-29 RX ADMIN — SODIUM CHLORIDE, PRESERVATIVE FREE 10 ML: 5 INJECTION INTRAVENOUS at 08:50

## 2020-08-29 NOTE — TELEPHONE ENCOUNTER
"AVS reviewed with caller and questions answered.    Reason for Disposition  • Health Information question, no triage required and triager able to answer question    Additional Information  • Negative: [1] Caller is not with the adult (patient) AND [2] reporting urgent symptoms  • Negative: Lab result questions  • Negative: Medication questions  • Negative: Caller can't be reached by phone  • Negative: Caller has already spoken to PCP or another triager  • Negative: RN needs further essential information from caller in order to complete triage  • Negative: Requesting regular office appointment  • Negative: [1] Caller requesting NON-URGENT health information AND [2] PCP's office is the best resource  • Negative: General information question, no triage required and triager able to answer question    Answer Assessment - Initial Assessment Questions  1. REASON FOR CALL or QUESTION: \"What is your reason for calling today?\" or \"How can I best help you?\" or \"What question do you have that I can help answer?\"      I have a question about my discharge instructions.    Protocols used: INFORMATION ONLY CALL - NO TRIAGE-ADULT-      "

## 2020-08-29 NOTE — OUTREACH NOTE
Prep Survey      Responses   [facility] facility patient discharged from?  [place]   Is LACE score < 7 ?  No   Eligibility  [facility]   Date of Admission  08/25/20   Date of Discharge  08/29/20   Discharge Disposition  Home or Self Care   Discharge diagnosis  COPD with exac   COVID-19 Test Status  Negative   Does the patient have one of the following disease processes/diagnoses(primary or secondary)?  COPD/Pneumonia   Does the patient have Home health ordered?  No   Is there a DME ordered?  No   Prep survey completed?  Yes          Kellie Bhatt RN

## 2020-08-31 ENCOUNTER — TRANSITIONAL CARE MANAGEMENT TELEPHONE ENCOUNTER (OUTPATIENT)
Dept: CALL CENTER | Facility: HOSPITAL | Age: 70
End: 2020-08-31

## 2020-08-31 NOTE — OUTREACH NOTE
Call Center TCM Note      Responses   [facility]   COVID-19 Test Status  Negative   Does the patient have one of the following disease processes/diagnoses(primary or secondary)?  COPD/Pneumonia   Was the primary reason for admission:  COPD exacerbation   TCM attempt successful?  Yes   Call start time  1051   Call end time  1056   Discharge diagnosis  COPD with exac   Meds reviewed with patient/caregiver?  Yes   Is the patient having any side effects they believe may be caused by any medication additions or changes?  No   Does the patient have all medications ordered at discharge?  Yes   Is the patient taking all medications as directed (includes completed medication regime)?  No   What is preventing the patient from taking all medications as directed?  Other [Spiriva was ordered. It will be in today. ]   Nursing Interventions  Nurse provided patient education [Pt will  today]   Does the patient have a primary care provider?   Yes   Does the patient have an appointment with their PCP or pulmonologist within 7 days of discharge?  Yes   Comments regarding PCP  hospital d/c f/u appt is on 9/3/20 at 2:30 pm    Has the patient kept scheduled appointments due by today?  N/A   Pulse Ox monitoring  None   Psychosocial issues?  No   Did the patient receive a copy of their discharge instructions?  Yes   Nursing interventions  Reviewed instructions with patient   What is the patient's perception of their health status since discharge?  Improving   Nursing Interventions  Nurse provided patient education   Are the patient's immunizations up to date?   Yes [except for flu ]   If the patient is a current smoker, are they able to teach back resources for cessation?  -- [Nonsmoker]   Is the patient/caregiver able to teach back the hierarchy of who to call/visit for symptoms/problems? PCP, Specialist, Home health nurse, Urgent Care, ED, 911  Yes   Is the patient able to teach back COPD  zones?  Yes   Nursing interventions  Education provided on various zones   Patient reports what zone on this call?  Green Zone   Green Zone  Reports doing well, Breathing without shortness of breath, Sleeping well, Appetite is good   Green Zone interventions:  Take daily medications, Do not smoke, Avoid indoor/outdoor triggers   TCM call completed?  Yes          Carlyn Gallardo RN    8/31/2020, 10:56

## 2020-09-01 LAB
LAB AP CASE REPORT: NORMAL
PATH REPORT.FINAL DX SPEC: NORMAL
PATH REPORT.GROSS SPEC: NORMAL

## 2020-09-02 LAB
CYTO UR: NORMAL
LAB AP CASE REPORT: NORMAL
PATH REPORT.FINAL DX SPEC: NORMAL
PATH REPORT.GROSS SPEC: NORMAL

## 2020-09-02 RX ORDER — SPIRONOLACTONE 100 MG/1
TABLET, FILM COATED ORAL
Qty: 90 TABLET | Refills: 1 | Status: SHIPPED | OUTPATIENT
Start: 2020-09-02 | End: 2021-03-29

## 2020-09-03 ENCOUNTER — OFFICE VISIT (OUTPATIENT)
Dept: INTERNAL MEDICINE | Facility: CLINIC | Age: 70
End: 2020-09-03

## 2020-09-03 VITALS
DIASTOLIC BLOOD PRESSURE: 72 MMHG | SYSTOLIC BLOOD PRESSURE: 130 MMHG | HEART RATE: 80 BPM | WEIGHT: 172 LBS | BODY MASS INDEX: 27 KG/M2 | HEIGHT: 67 IN | TEMPERATURE: 97.1 F

## 2020-09-03 DIAGNOSIS — J90 PLEURAL EFFUSION: ICD-10-CM

## 2020-09-03 DIAGNOSIS — J44.1 COPD WITH ACUTE EXACERBATION (HCC): ICD-10-CM

## 2020-09-03 DIAGNOSIS — Z23 NEED FOR INFLUENZA VACCINATION: Primary | ICD-10-CM

## 2020-09-03 DIAGNOSIS — I10 ESSENTIAL HYPERTENSION: ICD-10-CM

## 2020-09-03 LAB
BACTERIA FLD CULT: NORMAL
GRAM STN SPEC: NORMAL
GRAM STN SPEC: NORMAL

## 2020-09-03 PROCEDURE — G0008 ADMIN INFLUENZA VIRUS VAC: HCPCS | Performed by: INTERNAL MEDICINE

## 2020-09-03 PROCEDURE — 90694 VACC AIIV4 NO PRSRV 0.5ML IM: CPT | Performed by: INTERNAL MEDICINE

## 2020-09-03 PROCEDURE — 99496 TRANSJ CARE MGMT HIGH F2F 7D: CPT | Performed by: INTERNAL MEDICINE

## 2020-09-03 NOTE — PROGRESS NOTES
Transitional Care Follow Up Visit  Subjective     Che Landaverde is a 70 y.o. female who presents for a transitional care management visit.    Within 48 business hours after discharge our office contacted her via telephone to coordinate her care and needs.      I reviewed and discussed the details of that call along with the discharge summary, hospital problems, inpatient lab results, inpatient diagnostic studies, and consultation reports with Che.     Current outpatient and discharge medications have been reconciled for the patient.  Reviewed by: Myrna Tafoya MD      Date of TCM Phone Call 8/29/2020   [facility]   Date of Admission 8/25/2020   Date of Discharge 8/29/2020   Discharge Disposition Home or Self Care     Risk for Readmission (LACE) Score: 9 (8/29/2020  6:00 AM)      History of Present Illness   Course During Hospital Stay:  She was admitted with COPD exacerbation- feels much better.  Did not go home with oxygen.  She saw Dr. Edwards this morning who thinks she has COPD and has added Spiriva.  Took full course Omnicef- completed yesterday.  Her voice is raspy and pulm treating with nystatin.  He best relief was after the thoracentesis.   Plans another CT in 3 months.   Weight is down 10 lbs, appetite has been good.       The following portions of the patient's history were reviewed and updated as appropriate: allergies, current medications, past family history, past medical history, past social history, past surgical history and problem list.    Review of Systems   Constitutional: Negative for fever and unexpected weight change.   HENT: Positive for postnasal drip.    Respiratory: Positive for shortness of breath (much improved).    Gastrointestinal: Negative.    Genitourinary: Negative.    Musculoskeletal: Negative.    Skin: Negative.    Neurological: Negative.    Psychiatric/Behavioral: Negative.        Objective   Physical Exam   Constitutional: No distress.   HENT:   Voice raspy    Cardiovascular: Normal rate and regular rhythm.   Pulmonary/Chest: Effort normal and breath sounds normal.   Musculoskeletal: She exhibits no edema.   Neurological: She is alert.   Psychiatric: She has a normal mood and affect. Her behavior is normal. Judgment and thought content normal.       Assessment/Plan   Che was seen today for asthma.    Diagnoses and all orders for this visit:    Need for influenza vaccination  -     Fluad Quad 65+ yrs (5599-3640)    COPD with acute exacerbation (CMS/[facility])  Comments:  much improved- no source for quick decompensation- has f/u with Dr. Edwards.  stay on Spiriva.    Pleural effusion  Comments:  unclear etiology, echo normal.  f/u xray    Essential hypertension  Comments:  controlled.

## 2020-09-03 NOTE — PROGRESS NOTES
"  Assessment and Plan  [facility]  F/U and Patient Instructions    No follow-ups on file.  There are no Patient Instructions on file for this visit.    Kale Landaverde is a 70 y.o. female being seen in our office today for Asthma ([facility] )     History of the Present Illness  HPI    Patient History        Significant Past History  The following portions of the patient's history were reviewed and updated as appropriate:{PMHroutine:86238::\"Social history \",\"Allergies\",\"Current Medications\",\"Active Problem List\",\"Health Maintenance\"}              Social History  She  reports that she quit smoking about 10 years ago. She has never used smokeless tobacco. She reports that she drinks about 2.0 standard drinks of alcohol per week. She reports that she does not use drugs.                         Review of Symptoms  ROS  Objective  Vital Signs         BP Readings from Last 1 Encounters:   08/29/20 156/85     Wt Readings from Last 3 Encounters:   09/03/20 78 kg (172 lb)   08/26/20 81.6 kg (180 lb)   08/25/20 81.6 kg (180 lb)   Body mass index is 26.94 kg/m².          Physical Exam   Physical Exam  Data Reviewed    Recent Results (from the past 2016 hour(s))   COVID-19,BIOTAP, NP/OP SWAB IN TRANSPORT MEDIA OR SALINE 24-36 HR TAT - Swab, Nasopharynx    Collection Time: 08/05/20 10:15 AM   Result Value Ref Range    Reference Lab Report      COVID19 Not Detected Not Detected - Ref. Range   Hemoglobin, Blood Gases    Collection Time: 08/07/20 12:19 PM   Result Value Ref Range    Site Left Finger     Hemoglobin, Blood Gas 13.7 12 - 18 g/dL   Respiratory Panel PCR w/COVID-19(SARS-CoV-2) YUN/KESHAV/STEPHANIA/PAD In-House, NP Swab in UTM/VTM, 3-4 HR TAT - Swab, Nasopharynx    Collection Time: 08/25/20  5:22 PM   Result Value Ref Range    ADENOVIRUS, PCR Not Detected Not Detected    Coronavirus 229E Not Detected Not Detected    Coronavirus HKU1 Not Detected Not Detected    Coronavirus NL63 Not Detected " Not Detected    Coronavirus OC43 Not Detected Not Detected    COVID19 Not Detected Not Detected - Ref. Range    Human Metapneumovirus Not Detected Not Detected    Human Rhinovirus/Enterovirus Not Detected Not Detected    Influenza A PCR Not Detected Not Detected    Influenza A H1 Not Detected Not Detected    Influenza A H1 2009 PCR Not Detected Not Detected    Influenza A H3 Not Detected Not Detected    Influenza B PCR Not Detected Not Detected    Parainfluenza Virus 1 Not Detected Not Detected    Parainfluenza Virus 2 Not Detected Not Detected    Parainfluenza Virus 3 Not Detected Not Detected    Parainfluenza Virus 4 Not Detected Not Detected    RSV, PCR Not Detected Not Detected    Bordetella pertussis pcr Not Detected Not Detected    Bordetella parapertussis PCR Not Detected Not Detected    Chlamydophila pneumoniae PCR Not Detected Not Detected    Mycoplasma pneumo by PCR Not Detected Not Detected   Comprehensive Metabolic Panel    Collection Time: 08/25/20  8:44 PM   Result Value Ref Range    Glucose 117 (H) 65 - 99 mg/dL    BUN 6 (L) 8 - 23 mg/dL    Creatinine 0.62 0.57 - 1.00 mg/dL    Sodium 134 (L) 136 - 145 mmol/L    Potassium 4.0 3.5 - 5.2 mmol/L    Chloride 98 98 - 107 mmol/L    CO2 25.3 22.0 - 29.0 mmol/L    Calcium 9.3 8.6 - 10.5 mg/dL    Total Protein 7.0 6.0 - 8.5 g/dL    Albumin 3.80 3.50 - 5.20 g/dL    ALT (SGPT) 37 (H) 1 - 33 U/L    AST (SGOT) 27 1 - 32 U/L    Alkaline Phosphatase 49 39 - 117 U/L    Total Bilirubin 0.3 0.0 - 1.2 mg/dL    eGFR Non African Amer 95 >60 mL/min/1.73    Globulin 3.2 gm/dL    A/G Ratio 1.2 g/dL    BUN/Creatinine Ratio 9.7 7.0 - 25.0    Anion Gap 10.7 5.0 - 15.0 mmol/L   CBC (No Diff)    Collection Time: 08/25/20  8:44 PM   Result Value Ref Range    WBC 13.39 (H) 3.40 - 10.80 10*3/mm3    RBC 3.78 3.77 - 5.28 10*6/mm3    Hemoglobin 11.8 (L) 12.0 - 15.9 g/dL    Hematocrit 36.6 34.0 - 46.6 %    MCV 96.8 79.0 - 97.0 fL    MCH 31.2 26.6 - 33.0 pg    MCHC 32.2 31.5 - 35.7 g/dL     RDW 12.3 12.3 - 15.4 %    RDW-SD 44.1 37.0 - 54.0 fl    MPV 10.1 6.0 - 12.0 fL    Platelets 395 140 - 450 10*3/mm3   Troponin    Collection Time: 08/25/20  8:44 PM   Result Value Ref Range    Troponin T <0.010 0.000 - 0.030 ng/mL   BNP    Collection Time: 08/25/20  8:44 PM   Result Value Ref Range    proBNP 105.4 0.0 - 900.0 pg/mL   Basic Metabolic Panel    Collection Time: 08/26/20  7:05 AM   Result Value Ref Range    Glucose 228 (H) 65 - 99 mg/dL    BUN 8 8 - 23 mg/dL    Creatinine 0.69 0.57 - 1.00 mg/dL    Sodium 135 (L) 136 - 145 mmol/L    Potassium 4.5 3.5 - 5.2 mmol/L    Chloride 101 98 - 107 mmol/L    CO2 28.3 22.0 - 29.0 mmol/L    Calcium 9.2 8.6 - 10.5 mg/dL    eGFR Non African Amer 84 >60 mL/min/1.73    BUN/Creatinine Ratio 11.6 7.0 - 25.0    Anion Gap 5.7 5.0 - 15.0 mmol/L   CBC Auto Differential    Collection Time: 08/26/20  7:05 AM   Result Value Ref Range    WBC 9.00 3.40 - 10.80 10*3/mm3    RBC 3.80 3.77 - 5.28 10*6/mm3    Hemoglobin 12.1 12.0 - 15.9 g/dL    Hematocrit 36.2 34.0 - 46.6 %    MCV 95.3 79.0 - 97.0 fL    MCH 31.8 26.6 - 33.0 pg    MCHC 33.4 31.5 - 35.7 g/dL    RDW 11.8 (L) 12.3 - 15.4 %    RDW-SD 41.3 37.0 - 54.0 fl    MPV 10.3 6.0 - 12.0 fL    Platelets 392 140 - 450 10*3/mm3    Neutrophil % 83.8 (H) 42.7 - 76.0 %    Lymphocyte % 11.4 (L) 19.6 - 45.3 %    Monocyte % 3.7 (L) 5.0 - 12.0 %    Eosinophil % 0.0 (L) 0.3 - 6.2 %    Basophil % 0.2 0.0 - 1.5 %    Immature Grans % 0.9 (H) 0.0 - 0.5 %    Neutrophils, Absolute 7.54 (H) 1.70 - 7.00 10*3/mm3    Lymphocytes, Absolute 1.03 0.70 - 3.10 10*3/mm3    Monocytes, Absolute 0.33 0.10 - 0.90 10*3/mm3    Eosinophils, Absolute 0.00 0.00 - 0.40 10*3/mm3    Basophils, Absolute 0.02 0.00 - 0.20 10*3/mm3    Immature Grans, Absolute 0.08 (H) 0.00 - 0.05 10*3/mm3    nRBC 0.0 0.0 - 0.2 /100 WBC   Magnesium    Collection Time: 08/26/20  7:05 AM   Result Value Ref Range    Magnesium 2.2 1.6 - 2.4 mg/dL   Adult Transthoracic Echo Complete W/ Cont if  Necessary Per Protocol    Collection Time: 08/26/20  9:02 AM   Result Value Ref Range    BSA 1.9 m^2    IVSd 0.76 cm    LVIDd 4.6 cm    LVIDs 2.6 cm    LVPWd 0.75 cm    IVS/LVPW 1.0     FS 42.7 %    EDV(Teich) 97.4 ml    ESV(Teich) 25.4 ml    EF(Teich) 73.9 %    EDV(cubed) 97.4 ml    ESV(cubed) 18.3 ml    EF(cubed) 81.2 %    LV mass(C)d 110.1 grams    LV mass(C)dI 57.0 grams/m^2    SV(Teich) 71.9 ml    SI(Teich) 37.2 ml/m^2    SV(cubed) 79.1 ml    SI(cubed) 40.9 ml/m^2    Ao root diam 2.8 cm    Ao root area 6.1 cm^2    ACS 1.7 cm    Ao Arch Diam (Proximal trans.) 2.6 cm    desc Ao Diam 1.8 cm    LVOT diam 2.0 cm    LVOT area 3.2 cm^2    LVOT area(traced) 3.1 cm^2    RVOT diam 2.3 cm    RVOT area 4.0 cm^2    LVLd ap4 8.0 cm    EDV(MOD-sp4) 74.0 ml    LVLs ap4 6.5 cm    ESV(MOD-sp4) 18.0 ml    EF(MOD-sp4) 75.7 %    LVLd ap2 7.8 cm    EDV(MOD-sp2) 69.0 ml    LVLs ap2 6.4 cm    ESV(MOD-sp2) 17.0 ml    EF(MOD-sp2) 75.4 %    SV(MOD-sp4) 56.0 ml    SI(MOD-sp4) 29.0 ml/m^2    SV(MOD-sp2) 52.0 ml    SI(MOD-sp2) 26.9 ml/m^2    Ao root area (BSA corrected) 1.4     LV Rinaldi Vol (BSA corrected) 38.3 ml/m^2    LV Sys Vol (BSA corrected) 9.3 ml/m^2    EF(MOD-bp) 75.6 %    MV A dur 0.15 sec    MV E max sandra 124.0 cm/sec    MV A max sandra 166.1 cm/sec    MV E/A 0.75     MV V2 max 165.6 cm/sec    MV max PG 11.0 mmHg    MV V2 mean 94.9 cm/sec    MV mean PG 4.3 mmHg    MV V2 VTI 28.1 cm    MVA(VTI) 3.2 cm^2    MV P1/2t max sandra 120.1 cm/sec    MV P1/2t 53.4 msec    MVA(P1/2t) 4.1 cm^2    MV dec slope 659.3 cm/sec^2    MV dec time 150 sec    Ao pk sandra 162.1 cm/sec    Ao max PG 10.5 mmHg    Ao max PG (full) 4.8 mmHg    Ao V2 mean 109.0 cm/sec    Ao mean PG 5.5 mmHg    Ao mean PG (full) 2.6 mmHg    Ao V2 VTI 34.0 cm    CLEMENT(I,A) 2.7 cm^2    CLEMENT(I,D) 2.7 cm^2    CLEMENT(V,A) 2.4 cm^2    CLEMENT(V,D) 2.4 cm^2    LV V1 max PG 5.8 mmHg    LV V1 mean PG 2.9 mmHg    LV V1 max 120.0 cm/sec    LV V1 mean 81.0 cm/sec    LV V1 VTI 28.0 cm    SV(Ao) 205.7 ml     SI(Ao) 106.4 ml/m^2    SV(LVOT) 90.7 ml    SV(RVOT) 57.3 ml    SI(LVOT) 46.9 ml/m^2    PA V2 max 95.9 cm/sec    PA max PG 3.7 mmHg    PA max PG (full) 1.3 mmHg     CV ECHO VALERIE - PVA(V,A) 3.2 cm^2     CV ECHO VALERIE - PVA(V,D) 3.2 cm^2    PA acc time 0.12 sec    RV V1 max PG 2.4 mmHg    RV V1 mean PG 1.2 mmHg    RV V1 max 77.6 cm/sec    RV V1 mean 51.6 cm/sec    RV V1 VTI 14.3 cm    RAP systole 3.0 mmHg    PA pr(Accel) 26.7 mmHg    Pulm Sys Gabriel 78.1 cm/sec    Pulm Rinaldi Gabriel 46.0 cm/sec    Pulm S/D 1.7     Qp/Qs 0.63     Pulm A Revs Dur 0.15 sec    Pulm A Revs Gabriel 49.9 cm/sec    MVA P1/2T LCG 1.8 cm^2    RV Base 2.8 cm    RV Length 7.9 cm    RV Mid 1.9 cm    Lat Peak E' Gabriel 8.9 cm/sec    Med Peak E' Gabriel 9.6 cm/sec    RV S' 17.5 cm/sec     CV ECHO VALERIE - BZI_BMI 28.2 kilograms/m^2     CV ECHO VALERIE - BSA(HAYCOCK) 2.0 m^2     CV ECHO VALERIE - BZI_METRIC_WEIGHT 81.6 kg     CV ECHO VALERIE - BZI_METRIC_HEIGHT 170.2 cm    Avg E/e' ratio 13.41     Target HR (85%) 126 bpm    Max. Pred. HR (100%) 148 bpm     CV VAS BP LEFT /77 mmHg    LA volume 34.0 cm3    Dimensionless Index 0.8 (DI)    LA Volume Index 19.0 mL/m2    TAPSE (>1.6) 2.60 cm2    Echo EF Estimated 76 %   POC Glucose Once    Collection Time: 08/26/20  9:00 PM   Result Value Ref Range    Glucose 239 (H) 70 - 130 mg/dL   Basic Metabolic Panel    Collection Time: 08/27/20  5:18 AM   Result Value Ref Range    Glucose 215 (H) 65 - 99 mg/dL    BUN 13 8 - 23 mg/dL    Creatinine 0.77 0.57 - 1.00 mg/dL    Sodium 135 (L) 136 - 145 mmol/L    Potassium 4.0 3.5 - 5.2 mmol/L    Chloride 99 98 - 107 mmol/L    CO2 27.0 22.0 - 29.0 mmol/L    Calcium 9.7 8.6 - 10.5 mg/dL    eGFR Non African Amer 74 >60 mL/min/1.73    BUN/Creatinine Ratio 16.9 7.0 - 25.0    Anion Gap 9.0 5.0 - 15.0 mmol/L   CBC (No Diff)    Collection Time: 08/27/20  5:18 AM   Result Value Ref Range    WBC 14.75 (H) 3.40 - 10.80 10*3/mm3    RBC 3.81 3.77 - 5.28 10*6/mm3    Hemoglobin 12.0 12.0 - 15.9  g/dL    Hematocrit 34.8 34.0 - 46.6 %    MCV 91.3 79.0 - 97.0 fL    MCH 31.5 26.6 - 33.0 pg    MCHC 34.5 31.5 - 35.7 g/dL    RDW 11.8 (L) 12.3 - 15.4 %    RDW-SD 38.8 37.0 - 54.0 fl    MPV 9.9 6.0 - 12.0 fL    Platelets 507 (H) 140 - 450 10*3/mm3   Magnesium    Collection Time: 08/27/20  5:18 AM   Result Value Ref Range    Magnesium 2.3 1.6 - 2.4 mg/dL   Procalcitonin    Collection Time: 08/27/20  5:18 AM   Result Value Ref Range    Procalcitonin 0.07 0.00 - 0.25 ng/mL   Hemoglobin A1c    Collection Time: 08/27/20  5:19 AM   Result Value Ref Range    Hemoglobin A1C 6.40 (H) 4.80 - 5.60 %   POC Glucose Once    Collection Time: 08/27/20  6:11 AM   Result Value Ref Range    Glucose 201 (H) 70 - 130 mg/dL   POC Glucose Once    Collection Time: 08/27/20 10:59 AM   Result Value Ref Range    Glucose 272 (H) 70 - 130 mg/dL   D-dimer, Quantitative    Collection Time: 08/27/20  1:15 PM   Result Value Ref Range    D-Dimer, Quantitative 2.35 (H) 0.00 - 0.49 MCGFEU/mL   Duplex Venous Lower Extremity - Bilateral CAR    Collection Time: 08/27/20  2:07 PM   Result Value Ref Range    Right Common Femoral Spont Y     Right Common Femoral Phasic Y     Right Common Femoral Augment Y     Right Common Femoral Competent Y     Right Common Femoral Compress C     Right Saphenofemoral Junction Compress C     Right Profunda Femoral Compress C     Right Proximal Femoral Compress C     Right Mid Femoral Spont Y     Right Mid Femoral Phasic Y     Right Mid Femoral Augment Y     Right Mid Femoral Competent Y     Right Mid Femoral Compress C     Right Distal Femoral Compress C     Right Popliteal Spont Y     Right Popliteal Phasic Y     Right Popliteal Augment Y     Right Popliteal Competent Y     Right Popliteal Compress C     Right Posterior Tibial Compress C     Right Peroneal Compress C     Right GastronemiusSoleal Compress C     Right Greater Saph AK Compress C     Right Greater Saph BK Compress C     Right Lesser Saph Compress C     Left  Common Femoral Spont Y     Left Common Femoral Phasic Y     Left Common Femoral Augment Y     Left Common Femoral Competent Y     Left Common Femoral Compress C     Left Saphenofemoral Junction Compress C     Left Profunda Femoral Compress C     Left Proximal Femoral Compress C     Left Mid Femoral Spont Y     Left Mid Femoral Phasic Y     Left Mid Femoral Augment Y     Left Mid Femoral Competent Y     Left Mid Femoral Compress C     Left Distal Femoral Compress C     Left Popliteal Spont Y     Left Popliteal Phasic Y     Left Popliteal Augment Y     Left Popliteal Competent Y     Left Popliteal Compress C     Left Posterior Tibial Compress C     Left Peroneal Compress C     Left GastronemiusSoleal Compress C     Left Greater Saph AK Compress C     Left Greater Saph BK Compress C     Left Lesser Saph Compress C    POC Glucose Once    Collection Time: 08/27/20  4:07 PM   Result Value Ref Range    Glucose 187 (H) 70 - 130 mg/dL   POC Glucose Once    Collection Time: 08/27/20  8:09 PM   Result Value Ref Range    Glucose 229 (H) 70 - 130 mg/dL   POC Glucose Once    Collection Time: 08/28/20  5:56 AM   Result Value Ref Range    Glucose 151 (H) 70 - 130 mg/dL   POC Glucose Once    Collection Time: 08/28/20 11:30 AM   Result Value Ref Range    Glucose 105 70 - 130 mg/dL   Albumin, Fluid - Pleural Fluid, Pleural Cavity    Collection Time: 08/28/20  3:34 PM   Result Value Ref Range    Albumin, Fluid 2.10 g/dL   Protein, Body Fluid - Pleural Fluid, Pleural Cavity    Collection Time: 08/28/20  3:34 PM   Result Value Ref Range    Protein, Total, Fluid 3.6 g/dL   Lactate Dehydrogenase, Body Fluid - Pleural Fluid, Pleural Cavity    Collection Time: 08/28/20  3:34 PM   Result Value Ref Range    Lactate Dehydrogenase (LD), Fluid 351 U/L   Glucose, Body Fluid - Pleural Fluid, Pleural Cavity    Collection Time: 08/28/20  3:34 PM   Result Value Ref Range    Glucose, Fluid 123 mg/dL   Non-gynecologic Cytology    Collection Time:  08/28/20  3:34 PM   Result Value Ref Range    Case Report       Non-gynecologic Cytology                          Case: [id number]                                  Authorizing Provider:  Ramon Edwards MD        Collected:           08/28/2020 03:34 PM          Ordering Location:     [facility]  Received:            08/31/2020 11:20 AM                                 [address]                                                                      Pathologist:           Olman Whittaker MD                                                         Specimen:    Pleural Cavity, Left pleural fluid                                                         Final Diagnosis       1.  Pleural effusion, left:  ThinPrep and cell block        A. Negative for malignant cells         B.  Reactive mesothelial cells, histiocytes and mild mixed inflammation.      Gross Description       2 syringes received containing 85 ml total orange fluid w/ fibrin & blood. 1 thin prep, cellblock, & small amount sent to [facility] for flow cytometry.     Flow Cytometry, Path Only    Collection Time: 08/28/20  3:34 PM   Result Value Ref Range    Case Report       [facility] FLOW CYTOMETRY                                Case: [id number]                                  Authorizing Provider:  Ramon Edwards MD        Collected:           08/28/2020 03:34 PM          Ordering Location:     [facility]  Received:            08/31/2020 11:34 AM                                 [address]                                                                      Pathologist:           Olman Whittaker MD                                                         Specimen:    Pleural Cavity, Left pleural fluid                                                         Final Diagnosis       1. Pleural Cavity, Left Pleural Fluid, Flow Cytometry:     A. Polyclonal B-cells with CD5 positive T-cells with unremarkable CD4:CD8 ratio.    tish Rainey  Description       2 syringes received containing 85 ml total orange fluid w/ fibrin & blood. 1 thin prep, cellblock, & small amount sent to [facility] for flow cytometry.        Microscopic Description       Performed, incorporated in diagnosis.     Body Fluid Culture - Body Fluid, Pleural Cavity    Collection Time: 08/28/20  3:34 PM   Result Value Ref Range    Body Fluid Culture No growth at 5 days     Gram Stain No organisms seen     Gram Stain Few (2+) WBCs per low power field    Body fluid cell count - Pleural Fluid, Pleural Cavity    Collection Time: 08/28/20  3:34 PM   Result Value Ref Range    Color, Fluid Pink     Appearance, Fluid Hazy (A) Clear    RBC, Fluid 23,000 /mm3    Nucleated Cells, Fluid 3,786 /mm3    Method: Automated Sysmex XN Method    Body fluid differential - Pleural Fluid, Pleural Cavity    Collection Time: 08/28/20  3:34 PM   Result Value Ref Range    Neutrophils, Fluid 12 %    Lymphocytes, Fluid 58 %    Mononuclear, Fluid 30 %   POC Glucose Once    Collection Time: 08/28/20  4:42 PM   Result Value Ref Range    Glucose 171 (H) 70 - 130 mg/dL   POC Glucose Once    Collection Time: 08/28/20  8:25 PM   Result Value Ref Range    Glucose 279 (H) 70 - 130 mg/dL   POC Glucose Once    Collection Time: 08/29/20  6:20 AM   Result Value Ref Range    Glucose 135 (H) 70 - 130 mg/dL   POC Glucose Once    Collection Time: 08/29/20 11:18 AM   Result Value Ref Range    Glucose 115 70 - 130 mg/dL

## 2020-09-04 ENCOUNTER — TRANSCRIBE ORDERS (OUTPATIENT)
Dept: ADMINISTRATIVE | Facility: HOSPITAL | Age: 70
End: 2020-09-04

## 2020-09-04 DIAGNOSIS — R93.89 ABNORMAL CT SCAN: Primary | ICD-10-CM

## 2020-09-08 ENCOUNTER — READMISSION MANAGEMENT (OUTPATIENT)
Dept: CALL CENTER | Facility: HOSPITAL | Age: 70
End: 2020-09-08

## 2020-09-08 PROBLEM — R79.89 POSITIVE D DIMER: Status: RESOLVED | Noted: 2020-08-27 | Resolved: 2020-09-08

## 2020-09-08 RX ORDER — CITALOPRAM 20 MG/1
TABLET ORAL
Qty: 90 TABLET | Refills: 2 | Status: SHIPPED | OUTPATIENT
Start: 2020-09-08 | End: 2021-04-19

## 2020-09-08 NOTE — OUTREACH NOTE
COPD/PN Week 2 Survey      Responses   [facility]   COVID-19 Test Status  Negative   Does the patient have one of the following disease processes/diagnoses(primary or secondary)?  COPD/Pneumonia   Was the primary reason for admission:  COPD exacerbation   Week 2 attempt successful?  Yes   Call start time  1643   Call end time  1647   Discharge diagnosis  COPD with exac   Meds reviewed with patient/caregiver?  Yes   Is the patient having any side effects they believe may be caused by any medication additions or changes?  No   Does the patient have all medications ordered at discharge?  Yes   Is the patient taking all medications as directed (includes completed medication regime)?  Yes   Does the patient have a primary care provider?   Yes   Does the patient have an appointment with their PCP or pulmonologist within 7 days of discharge?  Yes   Has the patient kept scheduled appointments due by today?  Yes   Has home health visited the patient within 72 hours of discharge?  N/A   Pulse Ox monitoring  None   Psychosocial issues?  No   Did the patient receive a copy of their discharge instructions?  Yes   Nursing interventions  Reviewed instructions with patient   What is the patient's perception of their health status since discharge?  Improving   Nursing Interventions  Nurse provided patient education   Are the patient's immunizations up to date?   Yes   Nursing interventions  Educated on importance of maintaining up to date immunizations as advised by provider   Is the patient/caregiver able to teach back the hierarchy of who to call/visit for symptoms/problems? PCP, Specialist, Home health nurse, Urgent Care, ED, 911  Yes   Additional teach back comments  Good appetite and sleeping through the night, afebrile, no SOA and no chest pain.   Is the patient/caregiver able to teach back signs and symptoms of worsening condition:  Fever/chills, Shortness of breath, Chest pain   Is  the patient/caregiver able to teach back importance of completing antibiotic course of treatment?  Yes   Week 2 call completed?  Yes   Wrap up additional comments  She is writing a letter of thanks for her excellent care.          Brit Mcwilliams RN

## 2020-09-15 ENCOUNTER — READMISSION MANAGEMENT (OUTPATIENT)
Dept: CALL CENTER | Facility: HOSPITAL | Age: 70
End: 2020-09-15

## 2020-09-15 NOTE — OUTREACH NOTE
COPD/PN Week 3 Survey      Responses   [facility]   COVID-19 Test Status  Negative   Does the patient have one of the following disease processes/diagnoses(primary or secondary)?  COPD/Pneumonia   Was the primary reason for admission:  COPD exacerbation   Week 3 attempt successful?  Yes   Call start time  1524   Call end time  1526   Discharge diagnosis  COPD with exac   Meds reviewed with patient/caregiver?  Yes   Is the patient taking all medications as directed (includes completed medication regime)?  Yes   Has the patient kept scheduled appointments due by today?  Yes   What is the patient's perception of their health status since discharge?  Improving   Is the patient able to teach back COPD zones?  Yes   Nursing interventions  Education provided on various zones   Patient reports what zone on this call?  Green Zone   Green Zone  Reports doing well, Breathing without shortness of breath, Usual activity and exercise level   Green Zone interventions:  Take daily medications, Do not smoke, Avoid indoor/outdoor triggers   Week 3 call completed?  Yes   Revoked  No further contact(revokes)-requires comment   Graduated/Revoked comments  Pt reports a call is not necessary next week. She's doing well.          Carlyn Gallardo RN

## 2020-09-21 DIAGNOSIS — G43.709 CHRONIC MIGRAINE WITHOUT AURA WITHOUT STATUS MIGRAINOSUS, NOT INTRACTABLE: ICD-10-CM

## 2020-09-21 RX ORDER — GABAPENTIN 300 MG/1
300 CAPSULE ORAL 2 TIMES DAILY
Qty: 180 CAPSULE | Refills: 0 | Status: SHIPPED | OUTPATIENT
Start: 2020-09-21 | End: 2020-10-12 | Stop reason: SDUPTHER

## 2020-10-12 DIAGNOSIS — G43.709 CHRONIC MIGRAINE WITHOUT AURA WITHOUT STATUS MIGRAINOSUS, NOT INTRACTABLE: ICD-10-CM

## 2020-10-14 RX ORDER — GABAPENTIN 300 MG/1
300 CAPSULE ORAL 2 TIMES DAILY
Qty: 180 CAPSULE | Refills: 0 | Status: SHIPPED | OUTPATIENT
Start: 2020-10-14 | End: 2021-04-23 | Stop reason: SDUPTHER

## 2020-10-16 ENCOUNTER — APPOINTMENT (OUTPATIENT)
Dept: WOMENS IMAGING | Facility: HOSPITAL | Age: 70
End: 2020-10-16

## 2020-10-16 PROCEDURE — 77063 BREAST TOMOSYNTHESIS BI: CPT | Performed by: RADIOLOGY

## 2020-10-16 PROCEDURE — 77067 SCR MAMMO BI INCL CAD: CPT | Performed by: RADIOLOGY

## 2020-10-21 ENCOUNTER — RESULTS ENCOUNTER (OUTPATIENT)
Dept: INTERNAL MEDICINE | Facility: CLINIC | Age: 70
End: 2020-10-21

## 2020-10-21 DIAGNOSIS — R73.9 HYPERGLYCEMIA: ICD-10-CM

## 2020-11-16 LAB
ALBUMIN SERPL-MCNC: 4.5 G/DL (ref 3.5–5.2)
ALBUMIN/GLOB SERPL: 2.1 G/DL
ALP SERPL-CCNC: 49 U/L (ref 39–117)
ALT SERPL-CCNC: 25 U/L (ref 1–33)
AST SERPL-CCNC: 21 U/L (ref 1–32)
BILIRUB SERPL-MCNC: 0.2 MG/DL (ref 0–1.2)
BUN SERPL-MCNC: 8 MG/DL (ref 8–23)
BUN/CREAT SERPL: 10.7 (ref 7–25)
CALCIUM SERPL-MCNC: 9.4 MG/DL (ref 8.6–10.5)
CHLORIDE SERPL-SCNC: 98 MMOL/L (ref 98–107)
CO2 SERPL-SCNC: 30.6 MMOL/L (ref 22–29)
CREAT SERPL-MCNC: 0.75 MG/DL (ref 0.57–1)
GLOBULIN SER CALC-MCNC: 2.1 GM/DL
GLUCOSE SERPL-MCNC: 94 MG/DL (ref 65–99)
HBA1C MFR BLD: 6 % (ref 4.8–5.6)
POTASSIUM SERPL-SCNC: 4.9 MMOL/L (ref 3.5–5.2)
PROT SERPL-MCNC: 6.6 G/DL (ref 6–8.5)
SODIUM SERPL-SCNC: 137 MMOL/L (ref 136–145)

## 2020-11-18 DIAGNOSIS — I10 ESSENTIAL HYPERTENSION: Primary | ICD-10-CM

## 2020-11-23 ENCOUNTER — OFFICE VISIT (OUTPATIENT)
Dept: INTERNAL MEDICINE | Facility: CLINIC | Age: 70
End: 2020-11-23

## 2020-11-23 VITALS
BODY MASS INDEX: 28.56 KG/M2 | HEIGHT: 67 IN | TEMPERATURE: 97.7 F | HEART RATE: 74 BPM | SYSTOLIC BLOOD PRESSURE: 130 MMHG | WEIGHT: 182 LBS | DIASTOLIC BLOOD PRESSURE: 70 MMHG

## 2020-11-23 DIAGNOSIS — R73.03 PREDIABETES: ICD-10-CM

## 2020-11-23 DIAGNOSIS — G43.709 CHRONIC MIGRAINE WITHOUT AURA WITHOUT STATUS MIGRAINOSUS, NOT INTRACTABLE: ICD-10-CM

## 2020-11-23 DIAGNOSIS — I10 ESSENTIAL HYPERTENSION: Primary | ICD-10-CM

## 2020-11-23 PROBLEM — J18.9 CAP (COMMUNITY ACQUIRED PNEUMONIA): Status: RESOLVED | Noted: 2020-08-27 | Resolved: 2020-11-23

## 2020-11-23 PROCEDURE — 99213 OFFICE O/P EST LOW 20 MIN: CPT | Performed by: INTERNAL MEDICINE

## 2020-11-23 NOTE — PROGRESS NOTES
Assessment and Plan  Diagnoses and all orders for this visit:    1. Essential hypertension (Primary)  Comments:  doing great, no change.    2. Chronic migraine without aura without status migrainosus, not intractable  Comments:  stable.    3. Prediabetes  Comments:  A1C down, encourage same healthier plan!  Orders:  -     Comprehensive Metabolic Panel; Future  -     Hemoglobin A1c; Future  -     Lipid Panel With / Chol / HDL Ratio; Future      F/U and Patient Instructions    Return in about 6 months (around 5/23/2021) for Medicare Wellness, Lab Before FUP.  There are no Patient Instructions on file for this visit.    Subjective    Che Landaverde is a 70 y.o. female being seen in our office today for Hypertension     History of the Present Illness  HPI  Here for reg f/u- she feels that her mouth is very dry since starting the Spiriva.  Feels breathing back to baseline after hospitalization.  Not doing much coughing.  She is walking the neighborhood.  She is trying to eat more veggies.   No use of rescue inhaler since out of hospital.  Dexa done 2 years ago with Dr. Porter- to continue Fosamax thru this year and repeat again next summer.      Patient History        Significant Past History  The following portions of the patient's history were reviewed and updated as appropriate:PMHroutine: Social history , Allergies, Current Medications, Active Problem List and Health Maintenance              Social History  She  reports that she quit smoking about 10 years ago. She has never used smokeless tobacco. She reports current alcohol use of about 2.0 standard drinks of alcohol per week. She reports that she does not use drugs.                         Review of Symptoms  Review of Systems   Constitution: Negative for weight loss.   HENT: Negative for congestion.         Dry mouth   Cardiovascular: Negative for chest pain and dyspnea on exertion.   Respiratory: Negative for shortness of breath and wheezing. Cough: minimal.     Gastrointestinal: Negative for change in bowel habit.   Neurological: Negative for headaches (2 recent migraines 7 days apart- responded to sumatriptan x`1 each time.).   Psychiatric/Behavioral: Negative for depression.     Objective  Vital Signs         BP Readings from Last 1 Encounters:   11/23/20 130/70     Wt Readings from Last 3 Encounters:   11/23/20 82.6 kg (182 lb)   09/03/20 78 kg (172 lb)   08/26/20 81.6 kg (180 lb)   Body mass index is 28.51 kg/m².          Physical Exam   Physical Exam  Constitutional:       Appearance: Normal appearance.   HENT:      Mouth/Throat:      Mouth: Mucous membranes are dry.      Pharynx: No oropharyngeal exudate or posterior oropharyngeal erythema.   Cardiovascular:      Rate and Rhythm: Normal rate and regular rhythm.   Pulmonary:      Effort: Pulmonary effort is normal.       Data Reviewed    Recent Results (from the past 2016 hour(s))   Comprehensive Metabolic Panel    Collection Time: 11/16/20 12:00 AM    Specimen: Blood    BLOOD   Result Value Ref Range    Glucose 94 65 - 99 mg/dL    BUN 8 8 - 23 mg/dL    Creatinine 0.75 0.57 - 1.00 mg/dL    eGFR Non African Am 76 >60 mL/min/1.73    eGFR African Am 92 >60 mL/min/1.73    BUN/Creatinine Ratio 10.7 7.0 - 25.0    Sodium 137 136 - 145 mmol/L    Potassium 4.9 3.5 - 5.2 mmol/L    Chloride 98 98 - 107 mmol/L    Total CO2 30.6 (H) 22.0 - 29.0 mmol/L    Calcium 9.4 8.6 - 10.5 mg/dL    Total Protein 6.6 6.0 - 8.5 g/dL    Albumin 4.50 3.50 - 5.20 g/dL    Globulin 2.1 gm/dL    A/G Ratio 2.1 g/dL    Total Bilirubin 0.2 0.0 - 1.2 mg/dL    Alkaline Phosphatase 49 39 - 117 U/L    AST (SGOT) 21 1 - 32 U/L    ALT (SGPT) 25 1 - 33 U/L   Hemoglobin A1c    Collection Time: 11/16/20 12:00 AM    Specimen: Blood    BLOOD   Result Value Ref Range    Hemoglobin A1C 6.00 (H) 4.80 - 5.60 %

## 2020-11-30 ENCOUNTER — HOSPITAL ENCOUNTER (OUTPATIENT)
Dept: CT IMAGING | Facility: HOSPITAL | Age: 70
Discharge: HOME | End: 2020-11-30
Admitting: INTERNAL MEDICINE

## 2020-11-30 DIAGNOSIS — R93.89 ABNORMAL CT SCAN: ICD-10-CM

## 2020-11-30 PROCEDURE — 71250 CT THORAX DX C-: CPT

## 2020-11-30 RX ORDER — AMITRIPTYLINE HYDROCHLORIDE 10 MG/1
TABLET, FILM COATED ORAL
Qty: 180 TABLET | Refills: 1 | Status: SHIPPED | OUTPATIENT
Start: 2020-11-30 | End: 2021-04-23 | Stop reason: SDUPTHER

## 2021-01-05 RX ORDER — FLUTICASONE PROPIONATE 50 MCG
SPRAY, SUSPENSION (ML) NASAL
Qty: 48 G | Refills: 1 | Status: SHIPPED | OUTPATIENT
Start: 2021-01-05 | End: 2021-09-08

## 2021-01-05 RX ORDER — BUDESONIDE AND FORMOTEROL FUMARATE DIHYDRATE 160; 4.5 UG/1; UG/1
2 AEROSOL RESPIRATORY (INHALATION) EVERY 12 HOURS
Qty: 3 INHALER | Refills: 1 | Status: SHIPPED | OUTPATIENT
Start: 2021-01-05 | End: 2021-07-28

## 2021-01-14 ENCOUNTER — TRANSCRIBE ORDERS (OUTPATIENT)
Dept: ADMINISTRATIVE | Facility: HOSPITAL | Age: 71
End: 2021-01-14

## 2021-01-14 DIAGNOSIS — R91.8 LUNG NODULES: Primary | ICD-10-CM

## 2021-02-05 DIAGNOSIS — K21.9 GASTROESOPHAGEAL REFLUX DISEASE, UNSPECIFIED WHETHER ESOPHAGITIS PRESENT: Primary | ICD-10-CM

## 2021-02-05 RX ORDER — MONTELUKAST SODIUM 4 MG/1
TABLET, CHEWABLE ORAL
Qty: 180 TABLET | Refills: 3 | Status: SHIPPED | OUTPATIENT
Start: 2021-02-05

## 2021-03-02 DIAGNOSIS — Z23 IMMUNIZATION DUE: ICD-10-CM

## 2021-03-05 ENCOUNTER — TELEMEDICINE (OUTPATIENT)
Dept: INTERNAL MEDICINE | Facility: CLINIC | Age: 71
End: 2021-03-05

## 2021-03-05 DIAGNOSIS — R05.9 COUGH: Primary | ICD-10-CM

## 2021-03-05 PROBLEM — J90 PLEURAL EFFUSION: Status: RESOLVED | Noted: 2020-08-27 | Resolved: 2021-03-05

## 2021-03-05 PROCEDURE — 99213 OFFICE O/P EST LOW 20 MIN: CPT | Performed by: INTERNAL MEDICINE

## 2021-03-05 RX ORDER — BENZONATATE 100 MG/1
100 CAPSULE ORAL 3 TIMES DAILY PRN
Qty: 21 CAPSULE | Refills: 0 | Status: SHIPPED | OUTPATIENT
Start: 2021-03-05 | End: 2021-05-25

## 2021-03-05 NOTE — PROGRESS NOTES
Subjective        No chief complaint on file.          Che Landaverde is a 70 y.o. female who presents for    Patient Active Problem List   Diagnosis   • Moderate persistent asthma without complication   • Gastroesophageal reflux disease   • Hoarseness   • Essential hypertension   • Migraine   • Osteoarthritis of hip   • Dupuytren's contracture   • COPD with acute exacerbation (CMS/[facility])   • Prediabetes       History of Present Illness     She has had a cough for one week. She denies fever. She is not coughing any thing up. She has some intermittent dyspnea which is not  different than prior to illness. She has only used albuterol once in the last week. She has used Coricidin cough and cold without relief.   Allergies   Allergen Reactions   • Gluten Meal Other (See Comments)     Blisters        Current Outpatient Medications on File Prior to Visit   Medication Sig Dispense Refill   • alendronate (FOSAMAX) 70 MG tablet Take 70 mg by mouth Daily. Pt takes 1 pill once a week.     • amitriptyline (ELAVIL) 10 MG tablet TAKE 2 TABLETS AT BEDTIME 180 tablet 1   • budesonide-formoterol (SYMBICORT) 160-4.5 MCG/ACT inhaler INHALE 2 PUFFS EVERY 12 (TWELVE) HOURS. 3 inhaler 1   • calcium citrate-vitamin d (CALCIUM CITRATE + D3) 200-250 MG-UNIT tablet tablet Take  by mouth.     • Cetirizine HCl (ZYRTEC ALLERGY) 10 MG capsule Take  by mouth daily.     • citalopram (CeleXA) 20 MG tablet TAKE 1 TABLET EVERY DAY 90 tablet 2   • colestipol (COLESTID) 1 g tablet Pt taking 2 tabs po daily 180 tablet 3   • dapsone 25 MG tablet Take 25 mg by mouth Daily.     • fluticasone (FLONASE) 50 MCG/ACT nasal spray USE 2 SPRAYS IN EACH NOSTRIL EVERY DAY 48 g 1   • gabapentin (NEURONTIN) 300 MG capsule Take 1 capsule by mouth 2 (Two) Times a Day. 180 capsule 0   • metoprolol tartrate (LOPRESSOR) 25 MG tablet Takes at night 90 tablet 1   • montelukast (SINGULAIR) 10 MG tablet TAKE 1 TABLET EVERY DAY 90 tablet 1   • Multiple Vitamins-Minerals (MULTI  COMPLETE PO) Take  by mouth.     • nystatin (MYCOSTATIN) 564565 UNIT/ML suspension      • Omega-3 Fatty Acids (FISH OIL) 1200 MG capsule capsule Take  by mouth.     • omeprazole (priLOSEC) 40 MG capsule TAKE 1 CAPSULE EVERY DAY 90 capsule 3   • polycarbophil (FIBERCON) 625 MG tablet Take 625 mg by mouth as needed.     • Probiotic capsule Take  by mouth.     • spironolactone (ALDACTONE) 100 MG tablet TAKE 1 TABLET EVERY DAY 90 tablet 1   • SUMAtriptan (IMITREX) 100 MG tablet Take one tablet at onset of headache. May repeat dose one time in 2 hours if headache not relieved.     • tiotropium (Spiriva HandiHaler) 18 MCG per inhalation capsule Place 1 capsule into inhaler and inhale Daily. 30 capsule 0   • valACYclovir (VALTREX) 500 MG tablet Take 500 mg by mouth Daily.     • VENTOLIN  (90 Base) MCG/ACT inhaler Inhale 2 puffs Every 4 (Four) Hours As Needed for Wheezing. 3 inhaler 0     No current facility-administered medications on file prior to visit.        Past Medical History:   Diagnosis Date   • Aggressive former smoker    • Allergic rhinitis    • Anxiety    • Asthma    • Benign essential hypertension    • Cough    • Diarrhea    • Encounter for screening for lung cancer    • GERD (gastroesophageal reflux disease)    • Internal hemorrhoids    • Laceration of skin of lower leg    • Lung nodule    • Microscopic colitis    • Migraine, unspecified, not intractable, without status migrainosus    • Need for influenza vaccination    • Need for pneumococcal vaccination    • Osteoporosis    • Painful hip    • Pleural effusion 8/27/2020   • Reactive airway disease with wheezing with acute exacerbation    • Seasonal allergies    • Sleep disturbances    • Smoking history        Past Surgical History:   Procedure Laterality Date   • BRAVO PROCEDURE N/A 6/7/2016    Procedure: ESOPHAGOGASTRODUODENOSCOPY AND BRAVO ;  Surgeon: Laila Trevino MD;  Location: [facility] ENDOSCOPY;  Service:    • CATARACT EXTRACTION  07/13/2015    • COLONOSCOPY  2015    ih 3/15 Dr. Trevino   • SIGMOIDOSCOPY N/A 2016    Procedure: SIGMOIDOSCOPY FLEXIBLE to transverse colon with biopsy;  Surgeon: Laila Trevino MD;  Location: [facility] ENDOSCOPY;  Service:        Family History   Problem Relation Age of Onset   • Heart failure Mother    • Emphysema Mother    • Other Sister         Brain tumor   • Lung cancer Brother        Social History     Socioeconomic History   • Marital status: Single     Spouse name: Not on file   • Number of children: Not on file   • Years of education: Not on file   • Highest education level: Not on file   Tobacco Use   • Smoking status: Former Smoker     Quit date:      Years since quittin.1   • Smokeless tobacco: Never Used   • Tobacco comment: smoked 44 years, 2 packs a day    Substance and Sexual Activity   • Alcohol use: Yes     Alcohol/week: 2.0 standard drinks     Types: 2 Cans of beer per week     Comment: socially   • Drug use: No   • Sexual activity: Defer           The following portions of the patient's history were reviewed and updated as appropriate: problem list, allergies, current medications, past medical history, past family history, past social history and past surgical history.    Review of Systems    Immunization History   Administered Date(s) Administered   • COVID-19 (PFIZER) 2021   • Fluad Quad 65+ 2020   • Fluzone High Dose =>65 Years ([employer] ONLY) 10/23/2018   • Influenza, Unspecified 10/08/2019   • Pneumococcal Conjugate 13-Valent (PCV13) 2018   • Pneumococcal Polysaccharide (PPSV23) 10/01/2016   • Shingrix 03/10/2020, 2020   • Tdap 2020       Objective   There were no vitals filed for this visit.  There is no height or weight on file to calculate BMI.  Physical Exam  Neurological:      Mental Status: She is alert and oriented to person, place, and time.   Psychiatric:         Mood and Affect: Mood normal.         Procedures    Assessment/Plan   Diagnoses and  all orders for this visit:    1. Cough (Primary)  -     benzonatate (Tessalon Perles) 100 MG capsule; Take 1 capsule by mouth 3 (Three) Times a Day As Needed for Cough.  Dispense: 21 capsule; Refill: 0  -     COVID-19,[facility] ROUTINE, NP/OP SWAB IN TRANSPORT MEDIA OR ESWAB 72 HR TAT - Swab, Oropharynx               Swab for COVID. Recc Mucinex. If not improving next week then she needs to come in to be examined.  No follow-ups on file.

## 2021-03-06 LAB — SARS-COV-2 RNA RESP QL NAA+PROBE: NOT DETECTED

## 2021-03-08 NOTE — PROGRESS NOTES
Pt informed.  Her cough is still bad and she said she started the tessalon pearls and mucinex Friday night but her cough still is horrible. Would like to know if she should wait a few days

## 2021-03-09 ENCOUNTER — OFFICE VISIT (OUTPATIENT)
Dept: INTERNAL MEDICINE | Facility: CLINIC | Age: 71
End: 2021-03-09

## 2021-03-09 VITALS
HEIGHT: 67 IN | HEART RATE: 96 BPM | SYSTOLIC BLOOD PRESSURE: 144 MMHG | DIASTOLIC BLOOD PRESSURE: 76 MMHG | OXYGEN SATURATION: 96 % | WEIGHT: 183 LBS | TEMPERATURE: 97.7 F | BODY MASS INDEX: 28.72 KG/M2

## 2021-03-09 DIAGNOSIS — J44.1 COPD WITH EXACERBATION (HCC): Primary | ICD-10-CM

## 2021-03-09 PROCEDURE — 99214 OFFICE O/P EST MOD 30 MIN: CPT | Performed by: INTERNAL MEDICINE

## 2021-03-09 RX ORDER — AZITHROMYCIN 500 MG/1
500 TABLET, FILM COATED ORAL DAILY
Qty: 5 TABLET | Refills: 0 | Status: SHIPPED | OUTPATIENT
Start: 2021-03-09 | End: 2021-05-25

## 2021-03-09 NOTE — PROGRESS NOTES
"Chief Complaint  Cough    Subjective          Che MCKEE Layman presents to [facility]  History of Present Illness  Recurrent problem with cough- started 2 weeks ago- Covid negative- did telehealth visit with Dr. Serrano last week- put her on Mucinex and tessalon perles.  She does not feel any better- cough, no fever/chills/loss of appetite.   She has been using Ventolin at home but only once every day or so.        Objective   Vital Signs:   /76   Pulse 96   Temp 97.7 °F (36.5 °C)   Ht 170.2 cm (67\")   Wt 83 kg (183 lb)   SpO2 96%   BMI 28.66 kg/m²     Physical Exam  Constitutional:       Appearance: Normal appearance.   Cardiovascular:      Rate and Rhythm: Normal rate and regular rhythm.   Pulmonary:      Effort: No respiratory distress.      Breath sounds: Rhonchi (much improved after MN treatment) present.   Musculoskeletal:      Right lower leg: No edema.      Left lower leg: No edema.        Result Review :                 Assessment and Plan    Diagnoses and all orders for this visit:    1. COPD with exacerbation (CMS/[facility]) (Primary)  Comments:  will treat with abx and increased use of MDI- if remains symptomatic after next 24-48 hours, will add steroids.     Other orders  -     azithromycin (Zithromax) 500 MG tablet; Take 1 tablet by mouth Daily.  Dispense: 5 tablet; Refill: 0        Follow Up   No follow-ups on file.  Patient was given instructions and counseling regarding her condition or for health maintenance advice. Please see specific information pulled into the AVS if appropriate.       "

## 2021-03-15 DIAGNOSIS — G43.709 CHRONIC MIGRAINE WITHOUT AURA WITHOUT STATUS MIGRAINOSUS, NOT INTRACTABLE: Primary | ICD-10-CM

## 2021-03-15 RX ORDER — SUMATRIPTAN 100 MG/1
100 TABLET, FILM COATED ORAL ONCE AS NEEDED
Qty: 9 TABLET | Refills: 0 | Status: SHIPPED | OUTPATIENT
Start: 2021-03-15 | End: 2021-10-13 | Stop reason: SDUPTHER

## 2021-03-15 RX ORDER — ALBUTEROL SULFATE 90 UG/1
AEROSOL, METERED RESPIRATORY (INHALATION)
Qty: 54 G | Refills: 1 | Status: SHIPPED | OUTPATIENT
Start: 2021-03-15 | End: 2022-07-19

## 2021-03-29 RX ORDER — SPIRONOLACTONE 100 MG/1
TABLET, FILM COATED ORAL
Qty: 90 TABLET | Refills: 1 | Status: SHIPPED | OUTPATIENT
Start: 2021-03-29 | End: 2021-08-25

## 2021-03-29 RX ORDER — OMEPRAZOLE 40 MG/1
40 CAPSULE, DELAYED RELEASE ORAL DAILY
Qty: 90 CAPSULE | Refills: 3 | Status: SHIPPED | OUTPATIENT
Start: 2021-03-29 | End: 2022-02-28

## 2021-04-17 DIAGNOSIS — I10 ESSENTIAL HYPERTENSION: ICD-10-CM

## 2021-04-19 RX ORDER — CITALOPRAM 20 MG/1
TABLET ORAL
Qty: 90 TABLET | Refills: 2 | Status: SHIPPED | OUTPATIENT
Start: 2021-04-19 | End: 2021-11-29

## 2021-04-23 DIAGNOSIS — G43.709 CHRONIC MIGRAINE WITHOUT AURA WITHOUT STATUS MIGRAINOSUS, NOT INTRACTABLE: ICD-10-CM

## 2021-04-23 RX ORDER — AMITRIPTYLINE HYDROCHLORIDE 10 MG/1
20 TABLET, FILM COATED ORAL
Qty: 180 TABLET | Refills: 1 | Status: SHIPPED | OUTPATIENT
Start: 2021-04-23 | End: 2021-09-20

## 2021-04-23 RX ORDER — GABAPENTIN 300 MG/1
300 CAPSULE ORAL 2 TIMES DAILY
Qty: 180 CAPSULE | Refills: 0 | Status: SHIPPED | OUTPATIENT
Start: 2021-04-23 | End: 2021-08-09 | Stop reason: SDUPTHER

## 2021-04-26 RX ORDER — DICLOFENAC SODIUM 75 MG/1
75 TABLET, DELAYED RELEASE ORAL EVERY MORNING
Qty: 90 TABLET | Refills: 1 | Status: SHIPPED | OUTPATIENT
Start: 2021-04-26 | End: 2021-05-25

## 2021-05-25 ENCOUNTER — OFFICE VISIT (OUTPATIENT)
Dept: INTERNAL MEDICINE | Facility: CLINIC | Age: 71
End: 2021-05-25

## 2021-05-25 VITALS
SYSTOLIC BLOOD PRESSURE: 132 MMHG | TEMPERATURE: 97.2 F | HEIGHT: 67 IN | WEIGHT: 180 LBS | HEART RATE: 80 BPM | BODY MASS INDEX: 28.25 KG/M2 | DIASTOLIC BLOOD PRESSURE: 76 MMHG

## 2021-05-25 DIAGNOSIS — I10 ESSENTIAL HYPERTENSION: ICD-10-CM

## 2021-05-25 DIAGNOSIS — J41.8 MIXED SIMPLE AND MUCOPURULENT CHRONIC BRONCHITIS (HCC): ICD-10-CM

## 2021-05-25 DIAGNOSIS — R49.0 HOARSENESS: ICD-10-CM

## 2021-05-25 DIAGNOSIS — G89.29 CHRONIC LEFT-SIDED LOW BACK PAIN WITHOUT SCIATICA: ICD-10-CM

## 2021-05-25 DIAGNOSIS — Z00.00 MEDICARE ANNUAL WELLNESS VISIT, SUBSEQUENT: ICD-10-CM

## 2021-05-25 DIAGNOSIS — M54.50 CHRONIC LEFT-SIDED LOW BACK PAIN WITHOUT SCIATICA: ICD-10-CM

## 2021-05-25 DIAGNOSIS — R73.03 PREDIABETES: Primary | ICD-10-CM

## 2021-05-25 PROCEDURE — G0439 PPPS, SUBSEQ VISIT: HCPCS | Performed by: INTERNAL MEDICINE

## 2021-05-25 PROCEDURE — 1125F AMNT PAIN NOTED PAIN PRSNT: CPT | Performed by: INTERNAL MEDICINE

## 2021-05-25 PROCEDURE — 1159F MED LIST DOCD IN RCRD: CPT | Performed by: INTERNAL MEDICINE

## 2021-05-25 PROCEDURE — 96160 PT-FOCUSED HLTH RISK ASSMT: CPT | Performed by: INTERNAL MEDICINE

## 2021-05-25 PROCEDURE — 1170F FXNL STATUS ASSESSED: CPT | Performed by: INTERNAL MEDICINE

## 2021-05-25 RX ORDER — DICLOFENAC SODIUM 75 MG/1
75 TABLET, DELAYED RELEASE ORAL DAILY PRN
Qty: 90 TABLET | Refills: 1 | OUTPATIENT
Start: 2021-05-25 | End: 2022-04-06

## 2021-05-25 NOTE — PROGRESS NOTES
The ABCs of the Annual Wellness Visit  Subsequent Medicare Wellness Visit    Chief Complaint   Patient presents with   • Medicare Wellness-subsequent       Subjective   History of Present Illness:  Che Landaverde is a 70 y.o. female who presents for a Subsequent Medicare Wellness Visit.  Stopped the diclofenac that she was taking for L leg pain- worried about kidney, etc. - she has not had the leg pain again.  Trying to baby it to prevent it from coming back.  She hasn't been walking as she was.  She has remained on the gabapentin.   She is having a little issue with anxiety/worry-   She has been doing well with breathing- no recent exacerbations.     HEALTH RISK ASSESSMENT    Recent Hospitalizations:  No hospitalization(s) within the last year.    Current Medical Providers:  Patient Care Team:  Myrna Tafoya MD as PCP - General (Internal Medicine)  Iza Porter MD as Consulting Physician (Obstetrics and Gynecology)    Smoking Status:  Social History     Tobacco Use   Smoking Status Former Smoker   • Quit date:    • Years since quittin.4   Smokeless Tobacco Never Used   Tobacco Comment    smoked 44 years, 2 packs a day        Alcohol Consumption:  Social History     Substance and Sexual Activity   Alcohol Use Yes   • Alcohol/week: 2.0 standard drinks   • Types: 2 Cans of beer per week    Comment: socially       Depression Screen:   PHQ-2/PHQ-9 Depression Screening 2021   Little interest or pleasure in doing things 0   Feeling down, depressed, or hopeless 0   Trouble falling or staying asleep, or sleeping too much -   Feeling tired or having little energy 0   Poor appetite or overeating 0   Feeling bad about yourself - or that you are a failure or have let yourself or your family down 0   Trouble concentrating on things, such as reading the newspaper or watching television 0   Moving or speaking so slowly that other people could have noticed. Or the opposite - being so fidgety or restless that  you have been moving around a lot more than usual 0   Thoughts that you would be better off dead, or of hurting yourself in some way 0   Total Score 0       Fall Risk Screen:  [place] Fall Risk Assessment was completed, and patient is at LOW risk for falls.Assessment completed on:5/25/2021    Health Habits and Functional and Cognitive Screening:  Functional & Cognitive Status 5/25/2021   Do you have difficulty preparing food and eating? No   Do you have difficulty bathing yourself, getting dressed or grooming yourself? No   Do you have difficulty using the toilet? No   Do you have difficulty moving around from place to place? No   Do you have trouble with steps or getting out of a bed or a chair? No   Current Diet Well Balanced Diet   Dental Exam Up to date   Eye Exam Up to date   Exercise (times per week) 0 times per week   Current Exercises Include No Regular Exercise   Current Exercise Activities Include -   Do you need help using the phone?  No   Are you deaf or do you have serious difficulty hearing?  No   Do you need help with transportation? No   Do you need help shopping? No   Do you need help preparing meals?  No   Do you need help with housework?  No   Do you need help with laundry? No   Do you need help taking your medications? No   Do you need help managing money? No   Do you ever drive or ride in a car without wearing a seat belt? No   Have you felt unusual stress, anger or loneliness in the last month? No   Who do you live with? Alone   If you need help, do you have trouble finding someone available to you? No   Have you been bothered in the last four weeks by sexual problems? No   Do you have difficulty concentrating, remembering or making decisions? No         Does the patient have evidence of cognitive impairment? No    Asprin use counseling:Does not need ASA (and currently is not on it)    Age-appropriate Screening Schedule:  Refer to the list below for future screening recommendations based on  patient's age, sex and/or medical conditions. Orders for these recommended tests are listed in the plan section. The patient has been provided with a written plan.    Health Maintenance   Topic Date Due   • INFLUENZA VACCINE  08/01/2021   • DXA SCAN  10/10/2021   • LIPID PANEL  05/18/2022   • MAMMOGRAM  10/16/2022   • TDAP/TD VACCINES (2 - Td or Tdap) 05/21/2030   • ZOSTER VACCINE  Completed          The following portions of the patient's history were reviewed and updated as appropriate: allergies, current medications, past family history, past medical history, past social history, past surgical history and problem list.    Outpatient Medications Prior to Visit   Medication Sig Dispense Refill   • alendronate (FOSAMAX) 70 MG tablet Take 70 mg by mouth Daily. Pt takes 1 pill once a week.     • amitriptyline (ELAVIL) 10 MG tablet Take 2 tablets by mouth every night at bedtime. 180 tablet 1   • budesonide-formoterol (SYMBICORT) 160-4.5 MCG/ACT inhaler INHALE 2 PUFFS EVERY 12 (TWELVE) HOURS. 3 inhaler 1   • calcium citrate-vitamin d (CALCIUM CITRATE + D3) 200-250 MG-UNIT tablet tablet Take  by mouth.     • Cetirizine HCl (ZYRTEC ALLERGY) 10 MG capsule Take  by mouth daily.     • citalopram (CeleXA) 20 MG tablet TAKE 1 TABLET EVERY DAY 90 tablet 2   • colestipol (COLESTID) 1 g tablet Pt taking 2 tabs po daily 180 tablet 3   • dapsone 25 MG tablet Take 25 mg by mouth Daily.     • fluticasone (FLONASE) 50 MCG/ACT nasal spray USE 2 SPRAYS IN EACH NOSTRIL EVERY DAY 48 g 1   • gabapentin (NEURONTIN) 300 MG capsule Take 1 capsule by mouth 2 (Two) Times a Day. 180 capsule 0   • magnesium oxide (MAGOX) 400 (241.3 Mg) MG tablet tablet Take 400 mg by mouth Daily.     • metoprolol tartrate (LOPRESSOR) 25 MG tablet TAKE 1 TABLET AT NIGHT 90 tablet 1   • montelukast (SINGULAIR) 10 MG tablet TAKE 1 TABLET EVERY DAY 90 tablet 1   • Multiple Vitamins-Minerals (MULTI COMPLETE PO) Take  by mouth.     • nystatin (MYCOSTATIN) 227338  UNIT/ML suspension      • Omega-3 Fatty Acids (FISH OIL) 1200 MG capsule capsule Take  by mouth.     • omeprazole (priLOSEC) 40 MG capsule Take 1 capsule by mouth Daily. 90 capsule 3   • polycarbophil (FIBERCON) 625 MG tablet Take 625 mg by mouth as needed.     • Probiotic capsule Take  by mouth.     • spironolactone (ALDACTONE) 100 MG tablet TAKE 1 TABLET EVERY DAY 90 tablet 1   • SUMAtriptan (IMITREX) 100 MG tablet Take 1 tablet by mouth 1 (One) Time As Needed for Migraine for up to 1 dose. 9 tablet 0   • tiotropium (Spiriva HandiHaler) 18 MCG per inhalation capsule Place 1 capsule into inhaler and inhale Daily. 30 capsule 0   • Ventolin  (90 Base) MCG/ACT inhaler INHALE 2 PUFFS EVERY 4 HOURS AS NEEDED FOR WHEEZING 54 g 1   • azithromycin (Zithromax) 500 MG tablet Take 1 tablet by mouth Daily. 5 tablet 0   • benzonatate (Tessalon Perles) 100 MG capsule Take 1 capsule by mouth 3 (Three) Times a Day As Needed for Cough. 21 capsule 0   • diclofenac (VOLTAREN) 75 MG EC tablet Take 1 tablet by mouth Every Morning. 90 tablet 1   • valACYclovir (VALTREX) 500 MG tablet Take 500 mg by mouth Daily.       No facility-administered medications prior to visit.       Patient Active Problem List   Diagnosis   • Moderate persistent asthma without complication   • Gastroesophageal reflux disease   • Hoarseness   • Essential hypertension   • Migraine   • Dupuytren's contracture   • Mixed simple and mucopurulent chronic bronchitis (CMS/HCC)   • Prediabetes   • Chronic left-sided low back pain without sciatica       Advanced Care Planning:  ACP discussion was held with the patient during this visit. Patient has an advance directive in EMR which is still valid.     Review of Systems   Constitutional: Negative for activity change, appetite change and unexpected weight change.   HENT: Positive for congestion. Negative for sinus pressure.    Respiratory: Negative for cough and shortness of breath.    Cardiovascular: Negative for  "chest pain and leg swelling.   Gastrointestinal: Negative for abdominal pain.   Endocrine: Negative for cold intolerance.   Genitourinary: Negative for difficulty urinating.   Musculoskeletal: Negative for back pain and gait problem.   Neurological: Negative for headaches.   Psychiatric/Behavioral: Negative for sleep disturbance. The patient is nervous/anxious.        Compared to one year ago, the patient feels her physical health is better.  Compared to one year ago, the patient feels her mental health is better.    Reviewed chart for potential of high risk medication in the elderly: yes  Reviewed chart for potential of harmful drug interactions in the elderly:yes    Objective         Vitals:    05/25/21 0834   BP: 132/76   Pulse: 80   Temp: 97.2 °F (36.2 °C)   Weight: 81.6 kg (180 lb)   Height: 170.2 cm (67\")       Body mass index is 28.19 kg/m².  Discussed the patient's BMI with her. The BMI is above average; BMI management plan is completed.    Physical Exam  Constitutional:       Appearance: Normal appearance.   Cardiovascular:      Rate and Rhythm: Normal rate and regular rhythm.   Pulmonary:      Effort: Pulmonary effort is normal.      Breath sounds: Normal breath sounds.   Musculoskeletal:      Right lower leg: No edema.      Left lower leg: No edema.   Psychiatric:         Mood and Affect: Mood normal.         Thought Content: Thought content normal.         Lab Results   Component Value Date    GLU 94 05/18/2021    CHLPL 175 05/18/2021    TRIG 83 05/18/2021    HDL 47 05/18/2021     (H) 05/18/2021    VLDL 15 05/18/2021    HGBA1C 6.00 (H) 05/18/2021        Assessment/Plan   Medicare Risks and Personalized Health Plan  CMS Preventative Services Quick Reference  up to date    The above risks/problems have been discussed with the patient.  Pertinent information has been shared with the patient in the After Visit Summary.  Follow up plans and orders are seen below in the Assessment/Plan " Section.    Diagnoses and all orders for this visit:    1. Prediabetes (Primary)  Comments:  A1C stable at 6.0%- continue to watch diet    2. Essential hypertension  Comments:  Controlled    3. Mixed simple and mucopurulent chronic bronchitis (CMS/HCC)  Comments:  stable- using rescue inhaler about every other day.  She has f/u with pulmonary q6m    4. Hoarseness  Comments:  stable- followed by Dr. Navarro.     5. Chronic left-sided low back pain without sciatica  Comments:  remain on gabapentin, diclofenac only prn now.  Increase exercise as tolerated.     6. Medicare annual wellness visit, subsequent  Comments:  Overall doing great, no changes at this point-     Other orders  -     diclofenac (VOLTAREN) 75 MG EC tablet; Take 1 tablet by mouth Daily As Needed.  Dispense: 90 tablet; Refill: 1      Follow Up:  Return in about 6 months (around 11/25/2021) for Recheck, Lab Before FUP.     An After Visit Summary and PPPS were given to the patient.

## 2021-07-28 RX ORDER — BUDESONIDE AND FORMOTEROL FUMARATE DIHYDRATE 160; 4.5 UG/1; UG/1
AEROSOL RESPIRATORY (INHALATION)
Qty: 3 EACH | Refills: 1 | Status: SHIPPED | OUTPATIENT
Start: 2021-07-28 | End: 2021-12-02

## 2021-08-09 DIAGNOSIS — G43.709 CHRONIC MIGRAINE WITHOUT AURA WITHOUT STATUS MIGRAINOSUS, NOT INTRACTABLE: ICD-10-CM

## 2021-08-09 RX ORDER — GABAPENTIN 300 MG/1
300 CAPSULE ORAL 2 TIMES DAILY
Qty: 180 CAPSULE | Refills: 1 | Status: SHIPPED | OUTPATIENT
Start: 2021-08-09 | End: 2022-02-01 | Stop reason: SDUPTHER

## 2021-08-25 RX ORDER — SPIRONOLACTONE 100 MG/1
TABLET, FILM COATED ORAL
Qty: 90 TABLET | Refills: 1 | Status: SHIPPED | OUTPATIENT
Start: 2021-08-25 | End: 2022-01-17

## 2021-09-08 RX ORDER — FLUTICASONE PROPIONATE 50 MCG
SPRAY, SUSPENSION (ML) NASAL
Qty: 48 G | Refills: 1 | Status: SHIPPED | OUTPATIENT
Start: 2021-09-08 | End: 2022-07-19

## 2021-09-12 DIAGNOSIS — I10 ESSENTIAL HYPERTENSION: ICD-10-CM

## 2021-09-20 RX ORDER — AMITRIPTYLINE HYDROCHLORIDE 10 MG/1
TABLET, FILM COATED ORAL
Qty: 180 TABLET | Refills: 1 | Status: SHIPPED | OUTPATIENT
Start: 2021-09-20 | End: 2022-02-14

## 2021-10-12 RX ORDER — MONTELUKAST SODIUM 10 MG/1
TABLET ORAL
Qty: 90 TABLET | Refills: 1 | Status: SHIPPED | OUTPATIENT
Start: 2021-10-12 | End: 2022-03-16

## 2021-10-13 DIAGNOSIS — G43.709 CHRONIC MIGRAINE WITHOUT AURA WITHOUT STATUS MIGRAINOSUS, NOT INTRACTABLE: ICD-10-CM

## 2021-10-13 RX ORDER — SUMATRIPTAN 100 MG/1
100 TABLET, FILM COATED ORAL ONCE AS NEEDED
Qty: 9 TABLET | Refills: 0 | Status: SHIPPED | OUTPATIENT
Start: 2021-10-13 | End: 2022-07-28

## 2021-10-21 ENCOUNTER — APPOINTMENT (OUTPATIENT)
Dept: WOMENS IMAGING | Facility: HOSPITAL | Age: 71
End: 2021-10-21

## 2021-10-21 PROCEDURE — 77063 BREAST TOMOSYNTHESIS BI: CPT | Performed by: RADIOLOGY

## 2021-10-21 PROCEDURE — 77067 SCR MAMMO BI INCL CAD: CPT | Performed by: RADIOLOGY

## 2021-11-01 DIAGNOSIS — R73.03 PREDIABETES: ICD-10-CM

## 2021-11-11 ENCOUNTER — HOSPITAL ENCOUNTER (OUTPATIENT)
Dept: CT IMAGING | Facility: HOSPITAL | Age: 71
Discharge: HOME | End: 2021-11-11
Admitting: INTERNAL MEDICINE

## 2021-11-11 DIAGNOSIS — R91.8 LUNG NODULES: ICD-10-CM

## 2021-11-11 PROCEDURE — 71250 CT THORAX DX C-: CPT

## 2021-11-18 ENCOUNTER — TRANSCRIBE ORDERS (OUTPATIENT)
Dept: ADMINISTRATIVE | Facility: HOSPITAL | Age: 71
End: 2021-11-18

## 2021-11-18 DIAGNOSIS — R93.89 ABNORMAL CT SCAN: Primary | ICD-10-CM

## 2021-11-23 LAB
ALBUMIN SERPL-MCNC: 4.6 G/DL (ref 3.7–4.7)
ALBUMIN/GLOB SERPL: 1.9 {RATIO} (ref 1.2–2.2)
ALP SERPL-CCNC: 61 IU/L (ref 44–121)
ALT SERPL-CCNC: 26 IU/L (ref 0–32)
AST SERPL-CCNC: 23 IU/L (ref 0–40)
BILIRUB SERPL-MCNC: 0.2 MG/DL (ref 0–1.2)
BUN SERPL-MCNC: 10 MG/DL (ref 8–27)
BUN/CREAT SERPL: 13 (ref 12–28)
CALCIUM SERPL-MCNC: 9.3 MG/DL (ref 8.7–10.3)
CHLORIDE SERPL-SCNC: 101 MMOL/L (ref 96–106)
CO2 SERPL-SCNC: 23 MMOL/L (ref 20–29)
CREAT SERPL-MCNC: 0.8 MG/DL (ref 0.57–1)
GLOBULIN SER CALC-MCNC: 2.4 G/DL (ref 1.5–4.5)
GLUCOSE SERPL-MCNC: 106 MG/DL (ref 65–99)
HBA1C MFR BLD: 7.2 % (ref 4.8–5.6)
POTASSIUM SERPL-SCNC: 4.9 MMOL/L (ref 3.5–5.2)
PROT SERPL-MCNC: 7 G/DL (ref 6–8.5)
SODIUM SERPL-SCNC: 140 MMOL/L (ref 134–144)

## 2021-11-29 ENCOUNTER — OFFICE VISIT (OUTPATIENT)
Dept: INTERNAL MEDICINE | Facility: CLINIC | Age: 71
End: 2021-11-29

## 2021-11-29 VITALS
HEART RATE: 78 BPM | HEIGHT: 67 IN | WEIGHT: 179 LBS | BODY MASS INDEX: 28.09 KG/M2 | SYSTOLIC BLOOD PRESSURE: 128 MMHG | TEMPERATURE: 97.3 F | DIASTOLIC BLOOD PRESSURE: 66 MMHG

## 2021-11-29 DIAGNOSIS — E11.9 TYPE 2 DIABETES MELLITUS WITHOUT COMPLICATION, WITHOUT LONG-TERM CURRENT USE OF INSULIN (HCC): ICD-10-CM

## 2021-11-29 DIAGNOSIS — I10 ESSENTIAL HYPERTENSION: ICD-10-CM

## 2021-11-29 DIAGNOSIS — J41.8 MIXED SIMPLE AND MUCOPURULENT CHRONIC BRONCHITIS (HCC): Primary | ICD-10-CM

## 2021-11-29 PROCEDURE — 99213 OFFICE O/P EST LOW 20 MIN: CPT | Performed by: INTERNAL MEDICINE

## 2021-11-29 RX ORDER — VALACYCLOVIR HYDROCHLORIDE 500 MG/1
TABLET, FILM COATED ORAL
COMMUNITY
Start: 2021-11-15

## 2021-11-29 RX ORDER — CITALOPRAM 20 MG/1
TABLET ORAL
Qty: 90 TABLET | Refills: 2 | Status: SHIPPED | OUTPATIENT
Start: 2021-11-29 | End: 2022-07-07

## 2021-11-29 NOTE — PROGRESS NOTES
"Chief Complaint  Hypertension    Subjective          Che MCKEE Layfifi presents to [facility]  History of Present Illness  Here without complaints- she is not exercising as she once was- her eating is not as good.  Noted increase in A1C.  She has stopped walking the neighborhood due to laziness- not due to her breathing.   She saw Dr. Edwards- mucus plugging on CT, she is coughing as needed.  Trying to stay hydrated.    Objective   Vital Signs:   /66   Pulse 78   Temp 97.3 °F (36.3 °C)   Ht 170.2 cm (67\")   Wt 81.2 kg (179 lb)   BMI 28.04 kg/m²     Physical Exam  Constitutional:       Appearance: Normal appearance.   Cardiovascular:      Rate and Rhythm: Normal rate and regular rhythm.   Pulmonary:      Effort: Pulmonary effort is normal.      Breath sounds: Normal breath sounds.   Musculoskeletal:      Right lower leg: No edema.      Left lower leg: No edema.        Result Review :   The following data was reviewed by: Myrna Tafoya MD on 11/29/2021:  Common labs    Common Labsle 5/18/21 5/18/21 5/18/21 11/22/21 11/22/21    0845 0845 0845 0904 0904   Glucose 94   106 (A)    BUN 7 (A)   10    Creatinine 0.76   0.80    eGFR Non  Am 75   73    eGFR African Am 91   85    Sodium 139   140    Potassium 4.9   4.9    Chloride 101   101    Calcium 9.4   9.3    Total Protein 6.5   7.0    Albumin 4.30   4.6    Total Bilirubin 0.3   0.2    Alkaline Phosphatase 51   61    AST (SGOT) 27   23    ALT (SGPT) 33   26    Total Cholesterol   175     Triglycerides   83     HDL Cholesterol   47     LDL Cholesterol    113 (A)     Hemoglobin A1C  6.00 (A)   7.2 (A)   (A) Abnormal value       Comments are available for some flowsheets but are not being displayed.           Data reviewed: Radiologic studies CT chest          Assessment and Plan    Diagnoses and all orders for this visit:    1. Mixed simple and mucopurulent chronic bronchitis (HCC) (Primary)  Comments:  encouraged increased " activity, water, Mucinex as needed for mucus plugging. repeat CT.    2. Type 2 diabetes mellitus without complication, without long-term current use of insulin (HCC)  Comments:  wants to work on diet before meds- will recheck in 4 months.  Orders:  -     Comprehensive Metabolic Panel; Future  -     Hemoglobin A1c; Future  -     Lipid Panel With / Chol / HDL Ratio; Future  -     Microalbumin / Creatinine Urine Ratio - Urine, Clean Catch; Future    3. Essential hypertension  Comments:  excellent control        Follow Up   Return in about 4 months (around 3/29/2022) for Recheck, Lab Before FUP.  Patient was given instructions and counseling regarding her condition or for health maintenance advice. Please see specific information pulled into the AVS if appropriate.

## 2021-12-02 RX ORDER — BUDESONIDE AND FORMOTEROL FUMARATE DIHYDRATE 160; 4.5 UG/1; UG/1
AEROSOL RESPIRATORY (INHALATION)
Qty: 3 EACH | Refills: 1 | Status: SHIPPED | OUTPATIENT
Start: 2021-12-02 | End: 2022-06-10

## 2022-01-17 RX ORDER — SPIRONOLACTONE 100 MG/1
TABLET, FILM COATED ORAL
Qty: 90 TABLET | Refills: 1 | Status: SHIPPED | OUTPATIENT
Start: 2022-01-17 | End: 2022-06-15

## 2022-02-01 DIAGNOSIS — G43.709 CHRONIC MIGRAINE WITHOUT AURA WITHOUT STATUS MIGRAINOSUS, NOT INTRACTABLE: ICD-10-CM

## 2022-02-01 RX ORDER — GABAPENTIN 300 MG/1
300 CAPSULE ORAL 2 TIMES DAILY
Qty: 180 CAPSULE | Refills: 1 | Status: SHIPPED | OUTPATIENT
Start: 2022-02-01 | End: 2022-07-19

## 2022-02-09 DIAGNOSIS — I10 ESSENTIAL HYPERTENSION: ICD-10-CM

## 2022-02-14 ENCOUNTER — HOSPITAL ENCOUNTER (OUTPATIENT)
Dept: CT IMAGING | Facility: HOSPITAL | Age: 72
Discharge: HOME | End: 2022-02-14
Admitting: INTERNAL MEDICINE

## 2022-02-14 DIAGNOSIS — R93.89 ABNORMAL CT SCAN: ICD-10-CM

## 2022-02-14 PROCEDURE — 71250 CT THORAX DX C-: CPT

## 2022-02-14 RX ORDER — AMITRIPTYLINE HYDROCHLORIDE 10 MG/1
TABLET, FILM COATED ORAL
Qty: 180 TABLET | Refills: 1 | Status: SHIPPED | OUTPATIENT
Start: 2022-02-14 | End: 2022-07-06

## 2022-02-24 ENCOUNTER — TRANSCRIBE ORDERS (OUTPATIENT)
Dept: ADMINISTRATIVE | Facility: HOSPITAL | Age: 72
End: 2022-02-24

## 2022-02-24 DIAGNOSIS — R91.1 PULMONARY NODULE: Primary | ICD-10-CM

## 2022-02-28 RX ORDER — OMEPRAZOLE 40 MG/1
CAPSULE, DELAYED RELEASE ORAL
Qty: 90 CAPSULE | Refills: 3 | Status: SHIPPED | OUTPATIENT
Start: 2022-02-28 | End: 2022-12-12

## 2022-03-16 RX ORDER — MONTELUKAST SODIUM 10 MG/1
TABLET ORAL
Qty: 90 TABLET | Refills: 1 | Status: SHIPPED | OUTPATIENT
Start: 2022-03-16 | End: 2022-08-10

## 2022-03-29 ENCOUNTER — OFFICE VISIT (OUTPATIENT)
Dept: INTERNAL MEDICINE | Facility: CLINIC | Age: 72
End: 2022-03-29

## 2022-03-29 VITALS
BODY MASS INDEX: 28.25 KG/M2 | WEIGHT: 180 LBS | SYSTOLIC BLOOD PRESSURE: 122 MMHG | HEART RATE: 68 BPM | HEIGHT: 67 IN | TEMPERATURE: 97.3 F | DIASTOLIC BLOOD PRESSURE: 66 MMHG

## 2022-03-29 DIAGNOSIS — G89.29 CHRONIC LEFT-SIDED LOW BACK PAIN WITHOUT SCIATICA: ICD-10-CM

## 2022-03-29 DIAGNOSIS — E11.9 TYPE 2 DIABETES MELLITUS WITHOUT COMPLICATION, WITHOUT LONG-TERM CURRENT USE OF INSULIN: Primary | ICD-10-CM

## 2022-03-29 DIAGNOSIS — M54.50 CHRONIC LEFT-SIDED LOW BACK PAIN WITHOUT SCIATICA: ICD-10-CM

## 2022-03-29 DIAGNOSIS — M25.551 RIGHT HIP PAIN: ICD-10-CM

## 2022-03-29 DIAGNOSIS — J41.8 MIXED SIMPLE AND MUCOPURULENT CHRONIC BRONCHITIS: ICD-10-CM

## 2022-03-29 PROCEDURE — 99213 OFFICE O/P EST LOW 20 MIN: CPT | Performed by: INTERNAL MEDICINE

## 2022-03-29 NOTE — PROGRESS NOTES
"Chief Complaint  Hypertension and Knee Pain    Subjective          Che MCKEE Layman presents to [facility]  History of Present Illness Having some issues with L hip- catches, can do it several times in a day and then nothing.  She is having pain in the knee on the same leg. Twisted and hurt it but it's not an ongoing pain. She is walking on treadmill 5-10 minutes a day.  Breathing has been good.       Objective   Vital Signs:   /66   Pulse 68   Temp 97.3 °F (36.3 °C)   Ht 170.2 cm (67\")   Wt 81.6 kg (180 lb)   BMI 28.19 kg/m²     BMI is above normal parameters. Recommendations: exercise counseling/recommendations and pharmacological intervention options       Physical Exam  Constitutional:       Appearance: Normal appearance.   Cardiovascular:      Rate and Rhythm: Normal rate and regular rhythm.   Pulmonary:      Effort: Pulmonary effort is normal.      Breath sounds: Normal breath sounds.   Musculoskeletal:         General: No tenderness. Normal range of motion.      Right lower leg: No edema.      Left lower leg: No edema.        Result Review :   The following data was reviewed by: Myrna Tafoya MD on 03/29/2022:  Common labs    Common Labsle 5/18/21 5/18/21 5/18/21 11/22/21 11/22/21 3/22/22 3/22/22 3/22/22 3/22/22    0845 0845 0845 0904 0904 1012 1012 1012 1012   Glucose 94   106 (A)  115 (A)      BUN 7 (A)   10  7 (A)      Creatinine 0.76   0.80  0.76      eGFR Non  Am 75   73        eGFR  Am 91   85        Sodium 139   140  139      Potassium 4.9   4.9  4.8      Chloride 101   101  103      Calcium 9.4   9.3  8.9      Total Protein 6.5   7.0  7.1      Albumin 4.30   4.6  4.2      Total Bilirubin 0.3   0.2  0.4      Alkaline Phosphatase 51   61  57      AST (SGOT) 27   23  29      ALT (SGPT) 33   26  31      Total Cholesterol   175     191    Triglycerides   83     168 (A)    HDL Cholesterol   47     47    LDL Cholesterol    113 (A)     114 (A)  "   Hemoglobin A1C  6.00 (A)   7.2 (A)  6.6 (A)     Microalbumin, Urine         32.6   (A) Abnormal value       Comments are available for some flowsheets but are not being displayed.                     Assessment and Plan    Diagnoses and all orders for this visit:    1. Type 2 diabetes mellitus without complication, without long-term current use of insulin (HCC) (Primary)  Comments:  A1C down, will continue to work on diet.  Will monitor cholesterol as well, does not want statin  Orders:  -     Comprehensive Metabolic Panel; Future  -     Hemoglobin A1c; Future    2. Right hip pain  Comments:  would continue exercise for now.     3. Chronic left-sided low back pain without sciatica  Comments:  stable use of low dose gabapentin    4. Mixed simple and mucopurulent chronic bronchitis (HCC)  Comments:  actually doing great recently, no change.  Encouraged increasing walking on treadmill as able.         Follow Up   Return in about 6 months (around 9/29/2022) for Medicare Wellness, Lab Before FUP.  Patient was given instructions and counseling regarding her condition or for health maintenance advice. Please see specific information pulled into the AVS if appropriate.

## 2022-04-04 ENCOUNTER — HOSPITAL ENCOUNTER (OUTPATIENT)
Facility: HOSPITAL | Age: 72
Setting detail: OBSERVATION
Discharge: HOME | End: 2022-04-06
Attending: EMERGENCY MEDICINE | Admitting: STUDENT IN AN ORGANIZED HEALTH CARE EDUCATION/TRAINING PROGRAM

## 2022-04-04 ENCOUNTER — APPOINTMENT (OUTPATIENT)
Dept: GENERAL RADIOLOGY | Facility: HOSPITAL | Age: 72
End: 2022-04-04

## 2022-04-04 DIAGNOSIS — Z87.09 HISTORY OF COPD: ICD-10-CM

## 2022-04-04 DIAGNOSIS — J18.9 PNEUMONIA OF RIGHT UPPER LOBE DUE TO INFECTIOUS ORGANISM: Primary | ICD-10-CM

## 2022-04-04 DIAGNOSIS — A41.9 SEPSIS WITHOUT ACUTE ORGAN DYSFUNCTION, DUE TO UNSPECIFIED ORGANISM: ICD-10-CM

## 2022-04-04 LAB
ALBUMIN SERPL-MCNC: 4.5 G/DL (ref 3.5–5.2)
ALBUMIN/GLOB SERPL: 1.5 G/DL
ALP SERPL-CCNC: 63 U/L (ref 39–117)
ALT SERPL W P-5'-P-CCNC: 18 U/L (ref 1–33)
ANION GAP SERPL CALCULATED.3IONS-SCNC: 13.5 MMOL/L (ref 5–15)
AST SERPL-CCNC: 14 U/L (ref 1–32)
BASOPHILS # BLD AUTO: 0.07 10*3/MM3 (ref 0–0.2)
BASOPHILS NFR BLD AUTO: 0.3 % (ref 0–1.5)
BILIRUB SERPL-MCNC: 0.6 MG/DL (ref 0–1.2)
BUN SERPL-MCNC: 5 MG/DL (ref 8–23)
BUN/CREAT SERPL: 6.2 (ref 7–25)
CALCIUM SPEC-SCNC: 9.6 MG/DL (ref 8.6–10.5)
CHLORIDE SERPL-SCNC: 96 MMOL/L (ref 98–107)
CO2 SERPL-SCNC: 22.5 MMOL/L (ref 22–29)
CREAT SERPL-MCNC: 0.81 MG/DL (ref 0.57–1)
DEPRECATED RDW RBC AUTO: 38.5 FL (ref 37–54)
EGFRCR SERPLBLD CKD-EPI 2021: 76.8 ML/MIN/1.73
EOSINOPHIL # BLD AUTO: 0.03 10*3/MM3 (ref 0–0.4)
EOSINOPHIL NFR BLD AUTO: 0.1 % (ref 0.3–6.2)
ERYTHROCYTE [DISTWIDTH] IN BLOOD BY AUTOMATED COUNT: 11.7 % (ref 12.3–15.4)
GLOBULIN UR ELPH-MCNC: 3.1 GM/DL
GLUCOSE SERPL-MCNC: 166 MG/DL (ref 65–99)
HCT VFR BLD AUTO: 38.8 % (ref 34–46.6)
HGB BLD-MCNC: 13.5 G/DL (ref 12–15.9)
HOLD SPECIMEN: NORMAL
HOLD SPECIMEN: NORMAL
IMM GRANULOCYTES # BLD AUTO: 0.13 10*3/MM3 (ref 0–0.05)
IMM GRANULOCYTES NFR BLD AUTO: 0.6 % (ref 0–0.5)
LYMPHOCYTES # BLD AUTO: 1.94 10*3/MM3 (ref 0.7–3.1)
LYMPHOCYTES NFR BLD AUTO: 9 % (ref 19.6–45.3)
MCH RBC QN AUTO: 32 PG (ref 26.6–33)
MCHC RBC AUTO-ENTMCNC: 34.8 G/DL (ref 31.5–35.7)
MCV RBC AUTO: 91.9 FL (ref 79–97)
MONOCYTES # BLD AUTO: 2.08 10*3/MM3 (ref 0.1–0.9)
MONOCYTES NFR BLD AUTO: 9.7 % (ref 5–12)
NEUTROPHILS NFR BLD AUTO: 17.2 10*3/MM3 (ref 1.7–7)
NEUTROPHILS NFR BLD AUTO: 80.3 % (ref 42.7–76)
NRBC BLD AUTO-RTO: 0 /100 WBC (ref 0–0.2)
PLATELET # BLD AUTO: 426 10*3/MM3 (ref 140–450)
PMV BLD AUTO: 9.7 FL (ref 6–12)
POTASSIUM SERPL-SCNC: 4.3 MMOL/L (ref 3.5–5.2)
PROT SERPL-MCNC: 7.6 G/DL (ref 6–8.5)
RBC # BLD AUTO: 4.22 10*6/MM3 (ref 3.77–5.28)
SODIUM SERPL-SCNC: 132 MMOL/L (ref 136–145)
TROPONIN T SERPL-MCNC: <0.01 NG/ML (ref 0–0.03)
WBC NRBC COR # BLD: 21.45 10*3/MM3 (ref 3.4–10.8)
WHOLE BLOOD HOLD SPECIMEN: NORMAL
WHOLE BLOOD HOLD SPECIMEN: NORMAL

## 2022-04-04 PROCEDURE — 83605 ASSAY OF LACTIC ACID: CPT | Performed by: EMERGENCY MEDICINE

## 2022-04-04 PROCEDURE — 93010 ELECTROCARDIOGRAM REPORT: CPT | Performed by: INTERNAL MEDICINE

## 2022-04-04 PROCEDURE — 93005 ELECTROCARDIOGRAM TRACING: CPT | Performed by: EMERGENCY MEDICINE

## 2022-04-04 PROCEDURE — 85025 COMPLETE CBC W/AUTO DIFF WBC: CPT

## 2022-04-04 PROCEDURE — 71046 X-RAY EXAM CHEST 2 VIEWS: CPT

## 2022-04-04 PROCEDURE — 99285 EMERGENCY DEPT VISIT HI MDM: CPT

## 2022-04-04 PROCEDURE — 93005 ELECTROCARDIOGRAM TRACING: CPT

## 2022-04-04 PROCEDURE — 80053 COMPREHEN METABOLIC PANEL: CPT

## 2022-04-04 PROCEDURE — 87635 SARS-COV-2 COVID-19 AMP PRB: CPT | Performed by: EMERGENCY MEDICINE

## 2022-04-04 PROCEDURE — 84145 PROCALCITONIN (PCT): CPT | Performed by: EMERGENCY MEDICINE

## 2022-04-04 PROCEDURE — 84484 ASSAY OF TROPONIN QUANT: CPT

## 2022-04-04 PROCEDURE — 87040 BLOOD CULTURE FOR BACTERIA: CPT | Performed by: EMERGENCY MEDICINE

## 2022-04-04 PROCEDURE — 84484 ASSAY OF TROPONIN QUANT: CPT | Performed by: EMERGENCY MEDICINE

## 2022-04-04 PROCEDURE — C9803 HOPD COVID-19 SPEC COLLECT: HCPCS

## 2022-04-04 RX ORDER — ACETAMINOPHEN 500 MG
1000 TABLET ORAL ONCE
Status: COMPLETED | OUTPATIENT
Start: 2022-04-04 | End: 2022-04-05

## 2022-04-04 RX ORDER — SODIUM CHLORIDE 0.9 % (FLUSH) 0.9 %
10 SYRINGE (ML) INJECTION AS NEEDED
Status: DISCONTINUED | OUTPATIENT
Start: 2022-04-04 | End: 2022-04-06 | Stop reason: HOSPADM

## 2022-04-04 RX ORDER — ASPIRIN 325 MG
325 TABLET ORAL ONCE
Status: COMPLETED | OUTPATIENT
Start: 2022-04-04 | End: 2022-04-05

## 2022-04-04 NOTE — ED NOTES
Pt reports dyspnea. Pt has hx of COPD. Pt reports a headache and nausea and vomiting. Pt also reports chest pain in the middle of her chest with a pain rating of 8/10 with pain radiating to the back

## 2022-04-05 PROBLEM — A41.9 SEPSIS DUE TO PNEUMONIA (HCC): Status: ACTIVE | Noted: 2022-04-05

## 2022-04-05 PROBLEM — R00.0 TACHYCARDIA: Status: ACTIVE | Noted: 2022-04-05

## 2022-04-05 PROBLEM — J18.9 SEPSIS DUE TO PNEUMONIA: Status: ACTIVE | Noted: 2022-04-05

## 2022-04-05 PROBLEM — E11.65 TYPE 2 DIABETES MELLITUS WITH HYPERGLYCEMIA, WITHOUT LONG-TERM CURRENT USE OF INSULIN (HCC): Status: ACTIVE | Noted: 2020-11-23

## 2022-04-05 PROBLEM — E87.1 HYPONATREMIA: Status: ACTIVE | Noted: 2022-04-05

## 2022-04-05 PROBLEM — J18.9 PNEUMONIA OF RIGHT UPPER LOBE DUE TO INFECTIOUS ORGANISM: Status: ACTIVE | Noted: 2022-04-05

## 2022-04-05 LAB
ANION GAP SERPL CALCULATED.3IONS-SCNC: 9 MMOL/L (ref 5–15)
B PARAPERT DNA SPEC QL NAA+PROBE: NOT DETECTED
B PERT DNA SPEC QL NAA+PROBE: NOT DETECTED
BUN SERPL-MCNC: 7 MG/DL (ref 8–23)
BUN/CREAT SERPL: 9.5 (ref 7–25)
C PNEUM DNA NPH QL NAA+NON-PROBE: NOT DETECTED
CALCIUM SPEC-SCNC: 8.8 MG/DL (ref 8.6–10.5)
CHLORIDE SERPL-SCNC: 103 MMOL/L (ref 98–107)
CO2 SERPL-SCNC: 24 MMOL/L (ref 22–29)
CREAT SERPL-MCNC: 0.74 MG/DL (ref 0.57–1)
D-LACTATE SERPL-SCNC: 1.4 MMOL/L (ref 0.5–2)
DEPRECATED RDW RBC AUTO: 41.1 FL (ref 37–54)
EGFRCR SERPLBLD CKD-EPI 2021: 85.6 ML/MIN/1.73
ERYTHROCYTE [DISTWIDTH] IN BLOOD BY AUTOMATED COUNT: 11.9 % (ref 12.3–15.4)
FLUAV SUBTYP SPEC NAA+PROBE: NOT DETECTED
FLUBV RNA ISLT QL NAA+PROBE: NOT DETECTED
GLUCOSE BLDC GLUCOMTR-MCNC: 124 MG/DL (ref 70–130)
GLUCOSE BLDC GLUCOMTR-MCNC: 124 MG/DL (ref 70–130)
GLUCOSE BLDC GLUCOMTR-MCNC: 152 MG/DL (ref 70–130)
GLUCOSE BLDC GLUCOMTR-MCNC: 167 MG/DL (ref 70–130)
GLUCOSE SERPL-MCNC: 112 MG/DL (ref 65–99)
HADV DNA SPEC NAA+PROBE: NOT DETECTED
HCOV 229E RNA SPEC QL NAA+PROBE: NOT DETECTED
HCOV HKU1 RNA SPEC QL NAA+PROBE: NOT DETECTED
HCOV NL63 RNA SPEC QL NAA+PROBE: NOT DETECTED
HCOV OC43 RNA SPEC QL NAA+PROBE: NOT DETECTED
HCT VFR BLD AUTO: 33.6 % (ref 34–46.6)
HGB BLD-MCNC: 11.3 G/DL (ref 12–15.9)
HMPV RNA NPH QL NAA+NON-PROBE: NOT DETECTED
HPIV1 RNA ISLT QL NAA+PROBE: NOT DETECTED
HPIV2 RNA SPEC QL NAA+PROBE: NOT DETECTED
HPIV3 RNA NPH QL NAA+PROBE: NOT DETECTED
HPIV4 P GENE NPH QL NAA+PROBE: NOT DETECTED
L PNEUMO1 AG UR QL IA: NEGATIVE
M PNEUMO IGG SER IA-ACNC: NOT DETECTED
MCH RBC QN AUTO: 31.6 PG (ref 26.6–33)
MCHC RBC AUTO-ENTMCNC: 33.6 G/DL (ref 31.5–35.7)
MCV RBC AUTO: 93.9 FL (ref 79–97)
PLATELET # BLD AUTO: 370 10*3/MM3 (ref 140–450)
PMV BLD AUTO: 10.1 FL (ref 6–12)
POTASSIUM SERPL-SCNC: 3.9 MMOL/L (ref 3.5–5.2)
PROCALCITONIN SERPL-MCNC: 0.14 NG/ML (ref 0–0.25)
QT INTERVAL: 339 MS
RBC # BLD AUTO: 3.58 10*6/MM3 (ref 3.77–5.28)
RHINOVIRUS RNA SPEC NAA+PROBE: NOT DETECTED
RSV RNA NPH QL NAA+NON-PROBE: NOT DETECTED
S PNEUM AG SPEC QL LA: NEGATIVE
SARS-COV-2 RNA NPH QL NAA+NON-PROBE: NOT DETECTED
SARS-COV-2 RNA PNL SPEC NAA+PROBE: NOT DETECTED
SODIUM SERPL-SCNC: 136 MMOL/L (ref 136–145)
TROPONIN T SERPL-MCNC: <0.01 NG/ML (ref 0–0.03)
WBC NRBC COR # BLD: 18.51 10*3/MM3 (ref 3.4–10.8)

## 2022-04-05 PROCEDURE — 25010000002 CEFTRIAXONE PER 250 MG: Performed by: EMERGENCY MEDICINE

## 2022-04-05 PROCEDURE — 36415 COLL VENOUS BLD VENIPUNCTURE: CPT

## 2022-04-05 PROCEDURE — 82962 GLUCOSE BLOOD TEST: CPT

## 2022-04-05 PROCEDURE — G0378 HOSPITAL OBSERVATION PER HR: HCPCS

## 2022-04-05 PROCEDURE — 87899 AGENT NOS ASSAY W/OPTIC: CPT | Performed by: STUDENT IN AN ORGANIZED HEALTH CARE EDUCATION/TRAINING PROGRAM

## 2022-04-05 PROCEDURE — 25010000002 AZITHROMYCIN PER 500 MG: Performed by: EMERGENCY MEDICINE

## 2022-04-05 PROCEDURE — 80048 BASIC METABOLIC PNL TOTAL CA: CPT

## 2022-04-05 PROCEDURE — 94799 UNLISTED PULMONARY SVC/PX: CPT

## 2022-04-05 PROCEDURE — 85027 COMPLETE CBC AUTOMATED: CPT

## 2022-04-05 PROCEDURE — 96375 TX/PRO/DX INJ NEW DRUG ADDON: CPT

## 2022-04-05 PROCEDURE — 96372 THER/PROPH/DIAG INJ SC/IM: CPT

## 2022-04-05 PROCEDURE — 94761 N-INVAS EAR/PLS OXIMETRY MLT: CPT

## 2022-04-05 PROCEDURE — 25010000002 ONDANSETRON PER 1 MG

## 2022-04-05 PROCEDURE — 25010000002 ENOXAPARIN PER 10 MG

## 2022-04-05 PROCEDURE — 0202U NFCT DS 22 TRGT SARS-COV-2: CPT | Performed by: STUDENT IN AN ORGANIZED HEALTH CARE EDUCATION/TRAINING PROGRAM

## 2022-04-05 PROCEDURE — 94640 AIRWAY INHALATION TREATMENT: CPT

## 2022-04-05 PROCEDURE — 96365 THER/PROPH/DIAG IV INF INIT: CPT

## 2022-04-05 RX ORDER — BISACODYL 10 MG
10 SUPPOSITORY, RECTAL RECTAL DAILY PRN
Status: DISCONTINUED | OUTPATIENT
Start: 2022-04-05 | End: 2022-04-06 | Stop reason: HOSPADM

## 2022-04-05 RX ORDER — GABAPENTIN 300 MG/1
300 CAPSULE ORAL 2 TIMES DAILY
Status: DISCONTINUED | OUTPATIENT
Start: 2022-04-05 | End: 2022-04-06 | Stop reason: HOSPADM

## 2022-04-05 RX ORDER — BENZONATATE 100 MG/1
100 CAPSULE ORAL 3 TIMES DAILY PRN
Status: DISCONTINUED | OUTPATIENT
Start: 2022-04-05 | End: 2022-04-06 | Stop reason: HOSPADM

## 2022-04-05 RX ORDER — SPIRONOLACTONE 100 MG/1
100 TABLET, FILM COATED ORAL DAILY
Status: DISCONTINUED | OUTPATIENT
Start: 2022-04-05 | End: 2022-04-06 | Stop reason: HOSPADM

## 2022-04-05 RX ORDER — ONDANSETRON 2 MG/ML
4 INJECTION INTRAMUSCULAR; INTRAVENOUS EVERY 6 HOURS PRN
Status: DISCONTINUED | OUTPATIENT
Start: 2022-04-05 | End: 2022-04-06 | Stop reason: HOSPADM

## 2022-04-05 RX ORDER — SUMATRIPTAN 100 MG/1
100 TABLET, FILM COATED ORAL ONCE AS NEEDED
Status: DISCONTINUED | OUTPATIENT
Start: 2022-04-05 | End: 2022-04-06 | Stop reason: HOSPADM

## 2022-04-05 RX ORDER — BUDESONIDE AND FORMOTEROL FUMARATE DIHYDRATE 160; 4.5 UG/1; UG/1
2 AEROSOL RESPIRATORY (INHALATION)
Status: DISCONTINUED | OUTPATIENT
Start: 2022-04-05 | End: 2022-04-06 | Stop reason: HOSPADM

## 2022-04-05 RX ORDER — PANTOPRAZOLE SODIUM 40 MG/1
40 TABLET, DELAYED RELEASE ORAL EVERY MORNING
Refills: 3 | Status: DISCONTINUED | OUTPATIENT
Start: 2022-04-05 | End: 2022-04-06 | Stop reason: HOSPADM

## 2022-04-05 RX ORDER — ONDANSETRON 4 MG/1
4 TABLET, FILM COATED ORAL EVERY 6 HOURS PRN
Status: DISCONTINUED | OUTPATIENT
Start: 2022-04-05 | End: 2022-04-06 | Stop reason: HOSPADM

## 2022-04-05 RX ORDER — SODIUM CHLORIDE 9 MG/ML
125 INJECTION, SOLUTION INTRAVENOUS CONTINUOUS
Status: DISCONTINUED | OUTPATIENT
Start: 2022-04-05 | End: 2022-04-06

## 2022-04-05 RX ORDER — CETIRIZINE HYDROCHLORIDE 10 MG/1
10 TABLET ORAL DAILY
Status: DISCONTINUED | OUTPATIENT
Start: 2022-04-05 | End: 2022-04-06 | Stop reason: HOSPADM

## 2022-04-05 RX ORDER — AMITRIPTYLINE HYDROCHLORIDE 10 MG/1
20 TABLET, FILM COATED ORAL NIGHTLY
Status: DISCONTINUED | OUTPATIENT
Start: 2022-04-05 | End: 2022-04-06 | Stop reason: HOSPADM

## 2022-04-05 RX ORDER — INSULIN LISPRO 100 [IU]/ML
0-9 INJECTION, SOLUTION INTRAVENOUS; SUBCUTANEOUS
Status: DISCONTINUED | OUTPATIENT
Start: 2022-04-05 | End: 2022-04-06 | Stop reason: HOSPADM

## 2022-04-05 RX ORDER — POLYETHYLENE GLYCOL 3350 17 G/17G
17 POWDER, FOR SOLUTION ORAL DAILY PRN
Status: DISCONTINUED | OUTPATIENT
Start: 2022-04-05 | End: 2022-04-06 | Stop reason: HOSPADM

## 2022-04-05 RX ORDER — ALBUTEROL SULFATE 90 UG/1
2 AEROSOL, METERED RESPIRATORY (INHALATION) EVERY 4 HOURS PRN
Status: DISCONTINUED | OUTPATIENT
Start: 2022-04-05 | End: 2022-04-06 | Stop reason: HOSPADM

## 2022-04-05 RX ORDER — NICOTINE POLACRILEX 4 MG
15 LOZENGE BUCCAL
Status: DISCONTINUED | OUTPATIENT
Start: 2022-04-05 | End: 2022-04-06 | Stop reason: HOSPADM

## 2022-04-05 RX ORDER — ACETAMINOPHEN 325 MG/1
650 TABLET ORAL EVERY 4 HOURS PRN
Status: DISCONTINUED | OUTPATIENT
Start: 2022-04-05 | End: 2022-04-06 | Stop reason: HOSPADM

## 2022-04-05 RX ORDER — DEXTROSE MONOHYDRATE 25 G/50ML
25 INJECTION, SOLUTION INTRAVENOUS
Status: DISCONTINUED | OUTPATIENT
Start: 2022-04-05 | End: 2022-04-06 | Stop reason: HOSPADM

## 2022-04-05 RX ORDER — MONTELUKAST SODIUM 10 MG/1
10 TABLET ORAL DAILY
Status: DISCONTINUED | OUTPATIENT
Start: 2022-04-05 | End: 2022-04-06 | Stop reason: HOSPADM

## 2022-04-05 RX ORDER — CITALOPRAM 20 MG/1
20 TABLET ORAL DAILY
Status: DISCONTINUED | OUTPATIENT
Start: 2022-04-05 | End: 2022-04-06 | Stop reason: HOSPADM

## 2022-04-05 RX ORDER — UREA 10 %
3 LOTION (ML) TOPICAL NIGHTLY PRN
Status: DISCONTINUED | OUTPATIENT
Start: 2022-04-05 | End: 2022-04-06 | Stop reason: HOSPADM

## 2022-04-05 RX ORDER — NITROGLYCERIN 0.4 MG/1
0.4 TABLET SUBLINGUAL
Status: DISCONTINUED | OUTPATIENT
Start: 2022-04-05 | End: 2022-04-06 | Stop reason: HOSPADM

## 2022-04-05 RX ORDER — AMOXICILLIN 250 MG
2 CAPSULE ORAL DAILY
Status: DISCONTINUED | OUTPATIENT
Start: 2022-04-05 | End: 2022-04-06 | Stop reason: HOSPADM

## 2022-04-05 RX ORDER — FLUTICASONE PROPIONATE 50 MCG
2 SPRAY, SUSPENSION (ML) NASAL DAILY
Status: DISCONTINUED | OUTPATIENT
Start: 2022-04-05 | End: 2022-04-06 | Stop reason: HOSPADM

## 2022-04-05 RX ADMIN — PANTOPRAZOLE SODIUM 40 MG: 40 TABLET, DELAYED RELEASE ORAL at 12:57

## 2022-04-05 RX ADMIN — FLUTICASONE PROPIONATE 2 SPRAY: 50 SPRAY, METERED NASAL at 15:01

## 2022-04-05 RX ADMIN — METOPROLOL TARTRATE 25 MG: 25 TABLET, FILM COATED ORAL at 21:43

## 2022-04-05 RX ADMIN — AMITRIPTYLINE HYDROCHLORIDE 20 MG: 10 TABLET, FILM COATED ORAL at 21:43

## 2022-04-05 RX ADMIN — CEFTRIAXONE SODIUM 2 G: 10 INJECTION, POWDER, FOR SOLUTION INTRAVENOUS at 00:15

## 2022-04-05 RX ADMIN — CALCIUM CARBONATE-VITAMIN D TAB 500 MG-200 UNIT 1 TABLET: 500-200 TAB at 15:01

## 2022-04-05 RX ADMIN — GABAPENTIN 300 MG: 300 CAPSULE ORAL at 12:57

## 2022-04-05 RX ADMIN — BUDESONIDE AND FORMOTEROL FUMARATE DIHYDRATE 2 PUFF: 160; 4.5 AEROSOL RESPIRATORY (INHALATION) at 19:27

## 2022-04-05 RX ADMIN — ASPIRIN 325 MG: 325 TABLET ORAL at 00:16

## 2022-04-05 RX ADMIN — ACETAMINOPHEN 650 MG: 325 TABLET, FILM COATED ORAL at 08:45

## 2022-04-05 RX ADMIN — GABAPENTIN 300 MG: 300 CAPSULE ORAL at 21:42

## 2022-04-05 RX ADMIN — ACETAMINOPHEN 1000 MG: 500 TABLET ORAL at 00:16

## 2022-04-05 RX ADMIN — CETIRIZINE HYDROCHLORIDE 10 MG: 10 TABLET ORAL at 15:01

## 2022-04-05 RX ADMIN — CITALOPRAM 20 MG: 20 TABLET, FILM COATED ORAL at 15:01

## 2022-04-05 RX ADMIN — MONTELUKAST SODIUM 10 MG: 10 TABLET, FILM COATED ORAL at 15:01

## 2022-04-05 RX ADMIN — SODIUM CHLORIDE 1000 ML: 9 INJECTION, SOLUTION INTRAVENOUS at 00:42

## 2022-04-05 RX ADMIN — ACETAMINOPHEN 650 MG: 325 TABLET, FILM COATED ORAL at 19:41

## 2022-04-05 RX ADMIN — SODIUM CHLORIDE 500 MG: 9 INJECTION, SOLUTION INTRAVENOUS at 00:20

## 2022-04-05 RX ADMIN — BENZONATATE 100 MG: 100 CAPSULE ORAL at 08:45

## 2022-04-05 RX ADMIN — ENOXAPARIN SODIUM 40 MG: 100 INJECTION SUBCUTANEOUS at 12:57

## 2022-04-05 RX ADMIN — ONDANSETRON 4 MG: 2 INJECTION INTRAMUSCULAR; INTRAVENOUS at 08:45

## 2022-04-05 NOTE — PROGRESS NOTES
"[facility]acy [facility]: Enoxaparin Consult    Che Landaverde has a pharmacy consult to dose enoxaparin per KARINE Kunz's request.     Indication: Prophylaxis  Home Anticoagulation:       Relevant clinical data and objective history reviewed:  71 y.o. female 170.2 cm (67\") 79.8 kg (176 lb)   Body mass index is 27.57 kg/m².  Estimated Creatinine Clearance: 67.3 mL/min (by C-G formula based on SCr of 0.81 mg/dL).    Assessment/Plan    Will start patient on 40mg subcutaneous every 24 hours, adjusted for renal function. Consult order will be discontinued but pharmacy will continue to follow.     Yang Bass [facility]  Clinical Pharmacist      "

## 2022-04-05 NOTE — H&P
Patient Name:  Che Landaverde  YOB: 1950  MRN:  [id number]  Admit Date:  4/4/2022  Patient Care Team:  Myrna Tafoya MD as PCP - General (Internal Medicine)  Iza Porter MD as Consulting Physician (Obstetrics and Gynecology)      Subjective   History Present Illness     Chief Complaint   Patient presents with   • Shortness of Breath   • Chest Pain       Ms. Landaverde is a 71 y.o. female with a history of anxiety, depression, GERD, osteoporosis, migraines, HTN and asthma that presents to [facility] complaining of cough and shortness of air. Patient has been experiencing symptoms of Wednesday. She has also experienced Nausea occasionally and some mild chest discomfort with her cough. Patient came to [facility] Emergency Department for further evaluation. On initial workup, a chest x-ray had been performed and resulted yielding the patient has a right upper lobe infiltrate that is concerning for pneumonia. Given the patients symptoms and presentation, pneumonia is a high probability. Patient also noted to have and elevated blood glucose and subtherapeutic serum sodium. Patient's last hemoglobin A1c was 6.6 on 03/22/22.  During my examination patient was alert and oriented, ill-appearing but in no acute distress.  She had persistent frequent cough cough did sound wet in nature but patient reports it is nonproductive.  Patient has rhonchi and wheezing throughout lungs with an oxygen saturation of 90% on room air.  Patient reports weakness since onset of illness.  She is having trouble ambulating.  Patient understands that she has pneumonia and the treatment process has been reviewed with her she is agreeable to accept treatment at hospitalization.  Review of systems, physical exam, diagnostic data and plan as outlined below.      Review of Systems   Constitutional: Positive for activity change, appetite change, chills and fever. Negative for diaphoresis.   HENT:  Negative for sore throat and trouble swallowing.    Respiratory: Positive for cough, chest tightness and shortness of breath.    Cardiovascular: Positive for chest pain. Negative for palpitations and leg swelling.   Gastrointestinal: Positive for nausea. Negative for constipation, diarrhea and vomiting.   Endocrine: Positive for cold intolerance. Negative for heat intolerance.   Genitourinary: Negative for difficulty urinating.   Musculoskeletal: Positive for gait problem. Negative for arthralgias, back pain, myalgias, neck pain and neck stiffness.   Neurological: Positive for dizziness, weakness and headaches. Negative for light-headedness.   Psychiatric/Behavioral: Positive for sleep disturbance. Negative for agitation. The patient is not nervous/anxious.         Personal History     Past Medical History:   Diagnosis Date   • Aggressive former smoker    • Allergic rhinitis    • Anxiety    • Asthma    • Benign essential hypertension    • Cough    • Diarrhea    • Encounter for screening for lung cancer    • GERD (gastroesophageal reflux disease)    • Internal hemorrhoids    • Laceration of skin of lower leg    • Lung nodule    • Microscopic colitis    • Migraine, unspecified, not intractable, without status migrainosus    • Need for influenza vaccination    • Need for pneumococcal vaccination    • Osteoporosis    • Painful hip    • Pleural effusion 8/27/2020   • Reactive airway disease with wheezing with acute exacerbation    • Seasonal allergies    • Sleep disturbances    • Smoking history      Past Surgical History:   Procedure Laterality Date   • BRAVO PROCEDURE N/A 6/7/2016    Procedure: ESOPHAGOGASTRODUODENOSCOPY AND BRAVO ;  Surgeon: Laila Trevino MD;  Location: [facility] ENDOSCOPY;  Service:    • CATARACT EXTRACTION  07/13/2015   • COLONOSCOPY  03/06/2015    ih 3/15 Dr. Trevino   • SIGMOIDOSCOPY N/A 6/7/2016    Procedure: SIGMOIDOSCOPY FLEXIBLE to transverse colon with biopsy;  Surgeon: Laila Trevino MD;   Location: [facility] ENDOSCOPY;  Service:      Family History   Problem Relation Age of Onset   • Heart failure Mother    • Emphysema Mother    • Other Sister         Brain tumor   • Lung cancer Brother      Social History     Tobacco Use   • Smoking status: Former Smoker     Quit date:      Years since quittin.2   • Smokeless tobacco: Never Used   • Tobacco comment: smoked 44 years, 2 packs a day    Substance Use Topics   • Alcohol use: Yes     Alcohol/week: 2.0 standard drinks     Types: 2 Cans of beer per week     Comment: socially   • Drug use: No     No current facility-administered medications on file prior to encounter.     Current Outpatient Medications on File Prior to Encounter   Medication Sig Dispense Refill   • alendronate (FOSAMAX) 70 MG tablet Take 70 mg by mouth Daily. Pt takes 1 pill once a week.     • amitriptyline (ELAVIL) 10 MG tablet TAKE 2 TABLETS EVERY NIGHT AT BEDTIME 180 tablet 1   • calcium citrate-vitamin d (CITRACAL) 200-250 MG-UNIT tablet tablet Take  by mouth.     • Cetirizine HCl 10 MG capsule Take  by mouth daily.     • citalopram (CeleXA) 20 MG tablet TAKE 1 TABLET EVERY DAY 90 tablet 2   • colestipol (COLESTID) 1 g tablet Pt taking 2 tabs po daily 180 tablet 3   • dapsone 25 MG tablet Take 25 mg by mouth Daily.     • fluticasone (FLONASE) 50 MCG/ACT nasal spray USE 2 SPRAYS IN EACH NOSTRIL EVERY DAY 48 g 1   • gabapentin (NEURONTIN) 300 MG capsule Take 1 capsule by mouth 2 (Two) Times a Day. 180 capsule 1   • magnesium oxide (MAGOX) 400 (241.3 Mg) MG tablet tablet Take 400 mg by mouth Daily.     • metoprolol tartrate (LOPRESSOR) 25 MG tablet TAKE 1 TABLET AT NIGHT 90 tablet 1   • montelukast (SINGULAIR) 10 MG tablet TAKE 1 TABLET EVERY DAY 90 tablet 1   • Multiple Vitamins-Minerals (MULTI COMPLETE PO) Take  by mouth.     • Omega-3 Fatty Acids (FISH OIL) 1200 MG capsule capsule Take  by mouth.     • omeprazole (priLOSEC) 40 MG capsule TAKE 1 CAPSULE EVERY DAY 90 capsule 3   •  polycarbophil (FIBERCON) 625 MG tablet Take 625 mg by mouth as needed.     • Probiotic capsule Take  by mouth.     • spironolactone (ALDACTONE) 100 MG tablet TAKE 1 TABLET EVERY DAY 90 tablet 1   • SUMAtriptan (IMITREX) 100 MG tablet Take 1 tablet by mouth 1 (One) Time As Needed for Migraine for up to 1 dose. 9 tablet 0   • Symbicort 160-4.5 MCG/ACT inhaler INHALE 2 PUFFS EVERY 12 HOURS 3 each 1   • tiotropium (Spiriva HandiHaler) 18 MCG per inhalation capsule Place 1 capsule into inhaler and inhale Daily. 30 capsule 0   • valACYclovir (VALTREX) 500 MG tablet      • Ventolin  (90 Base) MCG/ACT inhaler INHALE 2 PUFFS EVERY 4 HOURS AS NEEDED FOR WHEEZING 54 g 1   • diclofenac (VOLTAREN) 75 MG EC tablet Take 1 tablet by mouth Daily As Needed. 90 tablet 1     Allergies   Allergen Reactions   • Gluten Meal Other (See Comments)     Blisters        Objective    Objective     Vital Signs  Temp:  [98.6 °F (37 °C)] 98.6 °F (37 °C)  Heart Rate:  [] 97  Resp:  [20-22] 20  BP: (126-140)/(65-77) 126/72  SpO2:  [91 %-94 %] 91 %  on   ;   Device (Oxygen Therapy): room air  Body mass index is 27.57 kg/m².    Physical Exam  Constitutional:       General: She is not in acute distress.     Appearance: Normal appearance. She is obese. She is ill-appearing.   HENT:      Head: Normocephalic and atraumatic.   Eyes:      General: No scleral icterus.     Extraocular Movements: Extraocular movements intact.      Conjunctiva/sclera: Conjunctivae normal.   Cardiovascular:      Rate and Rhythm: Normal rate and regular rhythm.   Pulmonary:      Effort: Pulmonary effort is normal.      Breath sounds: Examination of the right-upper field reveals rhonchi. Examination of the left-upper field reveals wheezing. Examination of the right-middle field reveals rhonchi. Examination of the right-lower field reveals decreased breath sounds. Examination of the left-lower field reveals decreased breath sounds. Decreased breath sounds, wheezing and  rhonchi present.   Abdominal:      General: Bowel sounds are normal.      Palpations: Abdomen is soft. There is no mass.      Tenderness: There is no abdominal tenderness.      Hernia: No hernia is present.   Musculoskeletal:         General: No deformity. Normal range of motion.      Cervical back: Normal range of motion and neck supple.   Skin:     General: Skin is warm and dry.   Neurological:      General: No focal deficit present.      Mental Status: She is alert. Mental status is at baseline.   Psychiatric:         Mood and Affect: Mood normal.         Behavior: Behavior normal.         Thought Content: Thought content normal.         Judgment: Judgment normal.         Results Review:  I reviewed the patient's new clinical results.  I reviewed the patient's new imaging results and agree with the interpretation.  I reviewed the patient's other test results and agree with the interpretation  I personally viewed and interpreted the patient's EKG/Telemetry data  Discussed with ED provider.    Lab Results (last 24 hours)     Procedure Component Value Units Date/Time    CBC & Differential [[id number]]  (Abnormal) Collected: 04/04/22 2127    Specimen: Blood from Arm, Right Updated: 04/04/22 2133    Narrative:      The following orders were created for panel order CBC & Differential.  Procedure                               Abnormality         Status                     ---------                               -----------         ------                     CBC Auto Differential[[id number]]        Abnormal            Final result                 Please view results for these tests on the individual orders.    Comprehensive Metabolic Panel [[id number]]  (Abnormal) Collected: 04/04/22 2127    Specimen: Blood from Arm, Right Updated: 04/04/22 2209     Glucose 166 mg/dL      BUN 5 mg/dL      Creatinine 0.81 mg/dL      Sodium 132 mmol/L      Potassium 4.3 mmol/L      Chloride 96 mmol/L      CO2 22.5 mmol/L      Calcium 9.6  mg/dL      Total Protein 7.6 g/dL      Albumin 4.50 g/dL      ALT (SGPT) 18 U/L      AST (SGOT) 14 U/L      Alkaline Phosphatase 63 U/L      Total Bilirubin 0.6 mg/dL      Globulin 3.1 gm/dL      A/G Ratio 1.5 g/dL      BUN/Creatinine Ratio 6.2     Anion Gap 13.5 mmol/L      eGFR 76.8 mL/min/1.73      Comment: National Kidney Foundation and American Society of Nephrology (ASN) Task Force recommended calculation based on the Chronic Kidney Disease Epidemiology Collaboration (CKD-EPI) equation refit without adjustment for race.       Narrative:      GFR Normal >60  Chronic Kidney Disease <60  Kidney Failure <15      Troponin [[id number]]  (Normal) Collected: 04/04/22 2127    Specimen: Blood from Arm, Right Updated: 04/04/22 2209     Troponin T <0.010 ng/mL     Narrative:      Troponin T Reference Range:  <= 0.03 ng/mL-   Negative for AMI  >0.03 ng/mL-     Abnormal for myocardial necrosis.  Clinicians would have to utilize clinical acumen, EKG, Troponin and serial changes to determine if it is an Acute Myocardial Infarction or myocardial injury due to an underlying chronic condition.       Results may be falsely decreased if patient taking Biotin.      CBC Auto Differential [[id number]]  (Abnormal) Collected: 04/04/22 2127    Specimen: Blood from Arm, Right Updated: 04/04/22 2133     WBC 21.45 10*3/mm3      RBC 4.22 10*6/mm3      Hemoglobin 13.5 g/dL      Hematocrit 38.8 %      MCV 91.9 fL      MCH 32.0 pg      MCHC 34.8 g/dL      RDW 11.7 %      RDW-SD 38.5 fl      MPV 9.7 fL      Platelets 426 10*3/mm3      Neutrophil % 80.3 %      Lymphocyte % 9.0 %      Monocyte % 9.7 %      Eosinophil % 0.1 %      Basophil % 0.3 %      Immature Grans % 0.6 %      Neutrophils, Absolute 17.20 10*3/mm3      Lymphocytes, Absolute 1.94 10*3/mm3      Monocytes, Absolute 2.08 10*3/mm3      Eosinophils, Absolute 0.03 10*3/mm3      Basophils, Absolute 0.07 10*3/mm3      Immature Grans, Absolute 0.13 10*3/mm3      nRBC 0.0 /100 WBC      Troponin [[id number]]  (Normal) Collected: 04/04/22 2328    Specimen: Blood Updated: 04/05/22 0002     Troponin T <0.010 ng/mL     Narrative:      Troponin T Reference Range:  <= 0.03 ng/mL-   Negative for AMI  >0.03 ng/mL-     Abnormal for myocardial necrosis.  Clinicians would have to utilize clinical acumen, EKG, Troponin and serial changes to determine if it is an Acute Myocardial Infarction or myocardial injury due to an underlying chronic condition.       Results may be falsely decreased if patient taking Biotin.      Blood Culture - Blood, Hand, Right [[id number]] Collected: 04/04/22 2328    Specimen: Blood from Hand, Right Updated: 04/04/22 2336    Blood Culture - Blood, Arm, Right [[id number]] Collected: 04/04/22 2328    Specimen: Blood from Arm, Right Updated: 04/04/22 2336    Lactic Acid, Plasma [[id number]]  (Normal) Collected: 04/04/22 2328    Specimen: Blood Updated: 04/05/22 0005     Lactate 1.4 mmol/L     COVID PRE-OP / PRE-PROCEDURE SCREENING ORDER (NO ISOLATION) - Swab, Nasopharynx [[id number]]  (Normal) Collected: 04/04/22 2331    Specimen: Swab from Nasopharynx Updated: 04/05/22 0018    Narrative:      The following orders were created for panel order COVID PRE-OP / PRE-PROCEDURE SCREENING ORDER (NO ISOLATION) - Swab, Nasopharynx.  Procedure                               Abnormality         Status                     ---------                               -----------         ------                     COVID-19,[facility]HOUSE...[[id number]]  Normal              Final result                 Please view results for these tests on the individual orders.    COVID-19,[facility]HOU[facility] CEPHEID/BRET NP SWAB IN TRANSPORT MEDIA 8-12 HR TAT - Swab, Nasopharynx [[id number]]  (Normal) Collected: 04/04/22 2331    Specimen: Swab from Nasopharynx Updated: 04/05/22 0018     COVID19 Not Detected    Narrative:      Fact sheet for providers: https://www.fda.gov/media/378494/download    Fact sheet for patients:  https://www.fda.gov/media/831581/download    Test performed by PCR.          Imaging Results (Last 24 Hours)     Procedure Component Value Units Date/Time    XR Chest 2 View [[id number]] Collected: 04/04/22 2147     Updated: 04/04/22 2151    Narrative:      PA AND LATERAL CHEST RADIOGRAPH     HISTORY: Chest pain and shortness of breath     COMPARISON: 02/14/2022     FINDINGS:  Heart size is within normal limits. Background changes of COPD are  noted. The patient has an infiltrate within the right upper lobe,  concerning for pneumonia. Calcified granuloma is noted within the left  upper lobe. No pneumothorax, pleural effusion, or acute infiltrate is  seen.       Impression:      Right upper lobe infiltrate, concerning for pneumonia. Short-term  follow-up exam to document resolution is recommended.     This report was finalized on 4/4/2022 9:48 PM by Dr. Nancy Moyer M.D.             Results for orders placed during the hospital encounter of 08/25/20    Adult Transthoracic Echo Complete W/ Cont if Necessary Per Protocol    Interpretation Summary  · Left ventricular systolic function is hyperdynamic (EF > 70%). Calculated EF = 75.6%. Estimated EF was in agreement with the calculated EF. Estimated EF = 76%. Normal left ventricular cavity size and wall thickness noted. All left ventricular wall segments contract normally. Left ventricular diastolic dysfunction is noted (grade I a w/high LAP) consistent with impaired relaxation.  · There is moderate calcification of the aortic valve.No aortic valve regurgitation is present. No aortic valve stenosis is present.  · Trace mitral valve regurgitation is present.    ECG 12 Lead   Preliminary Result   HEART RATE= 107  bpm   RR Interval= 561  ms   MD Interval= 135  ms   P Horizontal Axis=   deg   P Front Axis= 68  deg   QRSD Interval= 93  ms   QT Interval= 339  ms   QRS Axis= 45  deg   T Wave Axis= 71  deg   - OTHERWISE NORMAL ECG -   Sinus tachycardia   Electronically  Signed By:    Date and Time of Study: 2022-04-04 21:17:20      ECG 12 Lead    (Results Pending)     Assessment/Plan   Assessment/Plan   Active Hospital Problems    Diagnosis  POA   • **Pneumonia of right upper lobe due to infectious organism [J18.9]  Yes   • Sepsis due to pneumonia (HCC) [J18.9, A41.9]  Yes   • Hyponatremia [E87.1]  Yes   • Tachycardia [R00.0]  Yes   • Chronic left-sided low back pain without sciatica [M54.50, G89.29]  Yes   • Type 2 diabetes mellitus, without long-term current use of insulin (HCC) [E11.9]  Yes   • Essential hypertension [I10]  Yes   • Gastroesophageal reflux disease [K21.9]  Yes      Resolved Hospital Problems   No resolved problems to display.       71 y.o. female admitted with Pneumonia of right upper lobe due to infectious organism.    Pneumonia/sepsis  · IV Rocephin daily.  Patient received first dose emergency department  · Continuous cardiac monitoring  · Continuous pulse oximetry monitoring  · Supplemental oxygen as needed  · Incentive spirometry every 4 hours while awake  · Symptom management  · CBC in a.m.    Hyponatremia  · Gentle hydration via IV fluids.  Will initiate continuous normal saline infusion on this patient.  She has already received 1 L normal saline bolus in the emergency department during initial work-up  · BMP in a.m.  · Trend labs during hospitalization    Diabetes mellitus  · POC blood glucose monitoring before meals and at bedtime  · Moderate dose correctional insulin consult POC blood glucose monitoring  · Consistent carbohydrate diet  · Hypoglycemia protocol  · Previous hemoglobin A1c obtained last month and reviewed today    Tachycardia  · Likely related to patient's current pneumonia but we will continue to monitor closely  · Continuous cardiac monitoring       · Pharmacy to dose Lovenox for DVT prophylaxis.  · Full code.  · Discussed with patient and ED provider.      KARINE Richmond  [facility]  04/05/22  01:12  EDT  ,

## 2022-04-05 NOTE — ED NOTES
Pt in mask throughout encounter. This ERT was in appropriate PPE including a N95 mask and proper eyewear. Hand hygiene performed before entering room and after leaving room.

## 2022-04-05 NOTE — ED PROVIDER NOTES
EMERGENCY DEPARTMENT ENCOUNTER    Room Number:  15/15  Date of encounter:  4/5/2022  PCP: Myrna Tafoya MD  Historian: Patient      HPI:  Chief Complaint: Cough, shortness of breath  A complete HPI/ROS/PMH/PSH/SH/FH are unobtainable due to: None    Context: Che Landaverde is a 71 y.o. female who presents to the ED via private vehicle for evaluation for worsening cough and shortness of breath since Wednesday.  Borderline elevated temperature to 99.  Has had nausea but no vomiting or diarrhea.  Has had some chest pain with coughing.  Has been taking Mucinex which initially was helping but has not over the last couple days.  Has a history of COPD.       MEDICAL RECORD REVIEW    Office visit note reviewed from March 29, 2022, patient with history of chronic bronchitis but was doing well at that time.    PAST MEDICAL HISTORY  Active Ambulatory Problems     Diagnosis Date Noted   • Moderate persistent asthma without complication 05/21/2019   • Gastroesophageal reflux disease 05/21/2019   • Hoarseness 05/21/2019   • Essential hypertension 05/21/2019   • Migraine 05/21/2019   • Dupuytren's contracture 05/21/2019   • Mixed simple and mucopurulent chronic bronchitis (HCC) 08/27/2020   • Type 2 diabetes mellitus with hyperglycemia, without long-term current use of insulin (HCC) 11/23/2020   • Chronic left-sided low back pain without sciatica 05/25/2021     Resolved Ambulatory Problems     Diagnosis Date Noted   • Chronic diarrhea 05/21/2019   • Positive D dimer 08/27/2020   • Pleural effusion 08/27/2020   • CAP (community acquired pneumonia) 08/27/2020     Past Medical History:   Diagnosis Date   • Aggressive former smoker    • Allergic rhinitis    • Anxiety    • Asthma    • Benign essential hypertension    • Cough    • Diarrhea    • Encounter for screening for lung cancer    • GERD (gastroesophageal reflux disease)    • Internal hemorrhoids    • Laceration of skin of lower leg    • Lung nodule    • Microscopic colitis    •  Migraine, unspecified, not intractable, without status migrainosus    • Need for influenza vaccination    • Need for pneumococcal vaccination    • Osteoporosis    • Painful hip    • Reactive airway disease with wheezing with acute exacerbation    • Seasonal allergies    • Sleep disturbances    • Smoking history          PAST SURGICAL HISTORY  Past Surgical History:   Procedure Laterality Date   • BRAVO PROCEDURE N/A 2016    Procedure: ESOPHAGOGASTRODUODENOSCOPY AND BRAVO ;  Surgeon: Laila Trevino MD;  Location: [facility] ENDOSCOPY;  Service:    • CATARACT EXTRACTION  2015   • COLONOSCOPY  2015    ih 3/15 Dr. Trevino   • SIGMOIDOSCOPY N/A 2016    Procedure: SIGMOIDOSCOPY FLEXIBLE to transverse colon with biopsy;  Surgeon: Laila Trevino MD;  Location:  [facility] ENDOSCOPY;  Service:          FAMILY HISTORY  Family History   Problem Relation Age of Onset   • Heart failure Mother    • Emphysema Mother    • Other Sister         Brain tumor   • Lung cancer Brother          SOCIAL HISTORY  Social History     Socioeconomic History   • Marital status: Single   Tobacco Use   • Smoking status: Former Smoker     Quit date:      Years since quittin.2   • Smokeless tobacco: Never Used   • Tobacco comment: smoked 44 years, 2 packs a day    Substance and Sexual Activity   • Alcohol use: Yes     Alcohol/week: 2.0 standard drinks     Types: 2 Cans of beer per week     Comment: socially   • Drug use: No   • Sexual activity: Defer         ALLERGIES  Gluten meal        REVIEW OF SYSTEMS  Review of Systems     All systems reviewed and negative except for those discussed in HPI.       PHYSICAL EXAM    I have reviewed the triage vital signs and nursing notes.    ED Triage Vitals   Temp Heart Rate Resp BP SpO2   22   98.6 °F (37 °C) 118 22 136/65 91 %      Temp src Heart Rate Source Patient Position BP Location FiO2 (%)   22  1918 04/04/22 2129 04/04/22 2129 --   Tympanic Monitor Sitting Left arm        Physical Exam  General: Appears uncomfortable, nontoxic, nondiaphoretic  HEENT: Mucous membranes tacky, atraumatic, EOMI  Neck: Full ROM  Pulm: Symmetric chest rise, nonlabored, slightly diminished in the right mid upper lung fields with rales and rhonchi noted without evidence of any wheezing  Cardiovascular: Borderline mild regular rhythm tachycardia, intact distal pulses, no peripheral edema  GI: Soft, nontender, nondistended, no rebound, no guarding, bowel sounds present  MSK: Full ROM, no deformity  Skin: Warm, dry  Neuro: Awake, alert, oriented x 4, GCS 15, moving all extremities, no focal deficits  Psych: Calm, cooperative      N95, protective eye goggles, and gloves used during this encounter. Patient in surgical mask.      LAB RESULTS  Recent Results (from the past 24 hour(s))   ECG 12 Lead    Collection Time: 04/04/22  9:17 PM   Result Value Ref Range    QT Interval 339 ms   Comprehensive Metabolic Panel    Collection Time: 04/04/22  9:27 PM    Specimen: Arm, Right; Blood   Result Value Ref Range    Glucose 166 (H) 65 - 99 mg/dL    BUN 5 (L) 8 - 23 mg/dL    Creatinine 0.81 0.57 - 1.00 mg/dL    Sodium 132 (L) 136 - 145 mmol/L    Potassium 4.3 3.5 - 5.2 mmol/L    Chloride 96 (L) 98 - 107 mmol/L    CO2 22.5 22.0 - 29.0 mmol/L    Calcium 9.6 8.6 - 10.5 mg/dL    Total Protein 7.6 6.0 - 8.5 g/dL    Albumin 4.50 3.50 - 5.20 g/dL    ALT (SGPT) 18 1 - 33 U/L    AST (SGOT) 14 1 - 32 U/L    Alkaline Phosphatase 63 39 - 117 U/L    Total Bilirubin 0.6 0.0 - 1.2 mg/dL    Globulin 3.1 gm/dL    A/G Ratio 1.5 g/dL    BUN/Creatinine Ratio 6.2 (L) 7.0 - 25.0    Anion Gap 13.5 5.0 - 15.0 mmol/L    eGFR 76.8 >60.0 mL/min/1.73   Troponin    Collection Time: 04/04/22  9:27 PM    Specimen: Arm, Right; Blood   Result Value Ref Range    Troponin T <0.010 0.000 - 0.030 ng/mL   Green Top (Gel)    Collection Time: 04/04/22  9:27 PM   Result Value Ref Range     Extra Tube Hold for add-ons.    Lavender Top    Collection Time: 04/04/22  9:27 PM   Result Value Ref Range    Extra Tube hold for add-on    Gold Top - SST    Collection Time: 04/04/22  9:27 PM   Result Value Ref Range    Extra Tube Hold for add-ons.    Light Blue Top    Collection Time: 04/04/22  9:27 PM   Result Value Ref Range    Extra Tube hold for add-on    CBC Auto Differential    Collection Time: 04/04/22  9:27 PM    Specimen: Arm, Right; Blood   Result Value Ref Range    WBC 21.45 (H) 3.40 - 10.80 10*3/mm3    RBC 4.22 3.77 - 5.28 10*6/mm3    Hemoglobin 13.5 12.0 - 15.9 g/dL    Hematocrit 38.8 34.0 - 46.6 %    MCV 91.9 79.0 - 97.0 fL    MCH 32.0 26.6 - 33.0 pg    MCHC 34.8 31.5 - 35.7 g/dL    RDW 11.7 (L) 12.3 - 15.4 %    RDW-SD 38.5 37.0 - 54.0 fl    MPV 9.7 6.0 - 12.0 fL    Platelets 426 140 - 450 10*3/mm3    Neutrophil % 80.3 (H) 42.7 - 76.0 %    Lymphocyte % 9.0 (L) 19.6 - 45.3 %    Monocyte % 9.7 5.0 - 12.0 %    Eosinophil % 0.1 (L) 0.3 - 6.2 %    Basophil % 0.3 0.0 - 1.5 %    Immature Grans % 0.6 (H) 0.0 - 0.5 %    Neutrophils, Absolute 17.20 (H) 1.70 - 7.00 10*3/mm3    Lymphocytes, Absolute 1.94 0.70 - 3.10 10*3/mm3    Monocytes, Absolute 2.08 (H) 0.10 - 0.90 10*3/mm3    Eosinophils, Absolute 0.03 0.00 - 0.40 10*3/mm3    Basophils, Absolute 0.07 0.00 - 0.20 10*3/mm3    Immature Grans, Absolute 0.13 (H) 0.00 - 0.05 10*3/mm3    nRBC 0.0 0.0 - 0.2 /100 WBC   Troponin    Collection Time: 04/04/22 11:28 PM    Specimen: Blood   Result Value Ref Range    Troponin T <0.010 0.000 - 0.030 ng/mL   Lactic Acid, Plasma    Collection Time: 04/04/22 11:28 PM    Specimen: Blood   Result Value Ref Range    Lactate 1.4 0.5 - 2.0 mmol/L   Procalcitonin    Collection Time: 04/04/22 11:28 PM    Specimen: Blood   Result Value Ref Range    Procalcitonin 0.14 0.00 - 0.25 ng/mL   COVID-19,[facility] IN-HOUSE CEPHEID/BRET NP SWAB IN TRANSPORT MEDIA 8-12 HR TAT - Swab, Nasopharynx    Collection Time: 04/04/22 11:31 PM     Specimen: Nasopharynx; Swab   Result Value Ref Range    COVID19 Not Detected Not Detected - Ref. Range       Ordered the above labs and independently reviewed the results.        RADIOLOGY  XR Chest 2 View    Result Date: 4/4/2022  PA AND LATERAL CHEST RADIOGRAPH  HISTORY: Chest pain and shortness of breath  COMPARISON: 02/14/2022  FINDINGS: Heart size is within normal limits. Background changes of COPD are noted. The patient has an infiltrate within the right upper lobe, concerning for pneumonia. Calcified granuloma is noted within the left upper lobe. No pneumothorax, pleural effusion, or acute infiltrate is seen.      Right upper lobe infiltrate, concerning for pneumonia. Short-term follow-up exam to document resolution is recommended.  This report was finalized on 4/4/2022 9:48 PM by Dr. Nancy Moyer M.D.        I ordered the above noted radiological studies. Reviewed by me.  See dictation for official radiology interpretation.      PROCEDURES    Procedures  EKG    EKG Time: 2117  Rhythm/Rate: Sinus tachycardia with rate of 107  Normal axis  Normal intervals  No acute ischemic changes  No STEMI     Interpreted Contemporaneously by me, independently viewed  Slightly increased rate as compared to August 2020      MEDICATIONS GIVEN IN ER    Medications   sodium chloride 0.9 % flush 10 mL (has no administration in time range)   cefTRIAXone (ROCEPHIN) in SWFI 2 GRAMS/20ml IV PUSH syringe (has no administration in time range)   sodium chloride 0.9 % infusion (has no administration in time range)   nitroglycerin (NITROSTAT) SL tablet 0.4 mg (has no administration in time range)   acetaminophen (TYLENOL) tablet 650 mg (has no administration in time range)   ondansetron (ZOFRAN) tablet 4 mg (has no administration in time range)     Or   ondansetron (ZOFRAN) injection 4 mg (has no administration in time range)   melatonin tablet 3 mg (has no administration in time range)   Pharmacy to Dose enoxaparin (LOVENOX) (has  no administration in time range)   dextrose (GLUTOSE) oral gel 15 g (has no administration in time range)   dextrose (D50W) (25 g/50 mL) IV injection 25 g (has no administration in time range)   glucagon (human recombinant) (GLUCAGEN DIAGNOSTIC) injection 1 mg (has no administration in time range)   insulin lispro (ADMELOG) injection 0-9 Units (has no administration in time range)   enoxaparin (LOVENOX) syringe 40 mg (has no administration in time range)   guaiFENesin (ROBITUSSIN) 100 MG/5ML oral solution 200 mg (has no administration in time range)   benzonatate (TESSALON) capsule 100 mg (has no administration in time range)   aspirin tablet 325 mg (325 mg Oral Given 4/5/22 0016)   cefTRIAXone (ROCEPHIN) in SWFI 2 GRAMS/20ml IV PUSH syringe (2 g Intravenous Given 4/5/22 0015)   AZITHROMYCIN 500 MG/250 ML 0.9% NS IVPB (vial-mate) (0 mg Intravenous Stopped 4/5/22 0126)   acetaminophen (TYLENOL) tablet 1,000 mg (1,000 mg Oral Given 4/5/22 0016)   sodium chloride 0.9 % bolus 1,000 mL (0 mL Intravenous Stopped 4/5/22 0353)         PROGRESS, DATA ANALYSIS, CONSULTS, AND MEDICAL DECISION MAKING    All labs have been independently reviewed by me.  All radiology studies have been reviewed by me and discussed with radiologist dictating the report.   EKG's independently viewed and interpreted by me.  Discussion below represents my analysis of pertinent findings related to patient's condition, differential diagnosis, treatment plan and final disposition.    Initial concern for COPD exacerbation, pneumonia, failure, renal failure, electrolyte abnormalities, among others.    ED Course as of 04/05/22 0619   Mon Apr 04, 2022   2307 WBC(!): 21.45 [DC]   2307 Hemoglobin: 13.5 [DC]   2307 Platelets: 426 [DC]   2307 Neutrophil Rel %(!): 80.3 [DC]   2307 Immature Granulocyte Rel %(!): 0.6 [DC]   2307 Glucose(!): 166 [DC]   2307 BUN(!): 5 [DC]   2307 Creatinine: 0.81 [DC]   2307 Sodium(!): 132 [DC]   2307 Potassium: 4.3 [DC]   2307  Troponin T: <0.010 [DC]   2307 XR Chest 2 View  reviewed [DC]   Tue Apr 05, 2022   0021 Lactate: 1.4 [DC]   0021 COVID19: Not Detected [DC]   0025 Discussed with KARINE Paul [facility], discussed patient clinical course and findings today, treatment modalities, and need for hospitalization. [DC]      ED Course User Index  [DC] Ion Hyman MD     Patient with a right upper lobe pneumonia with white blood cell count 21,000 and obvious dyspnea at rest with oxygen saturations 90 to 92% on room air.  I do think hospitalization to be warranted and reasonable at this time.  Patient and family updated on the course and plan and need for hospital stay.    AS OF 06:19 EDT VITALS:    BP - 127/63  HR - 89  TEMP - 98.6 °F (37 °C) (Tympanic)  02 SATS - 92%        DIAGNOSIS  Final diagnoses:   Pneumonia of right upper lobe due to infectious organism   Sepsis without acute organ dysfunction, due to unspecified organism (HCC)   History of COPD         DISPOSITION  HOSPITALIZATION    Discussed treatment plan and reason for hospitalization with pt/family and hospitalizing physician.  Pt/family voiced understanding of the plan for hospitalization for further testing/treatment as needed.                  Ion Hyman MD  04/05/22 5996

## 2022-04-06 ENCOUNTER — READMISSION MANAGEMENT (OUTPATIENT)
Dept: CALL CENTER | Facility: HOSPITAL | Age: 72
End: 2022-04-06

## 2022-04-06 VITALS
TEMPERATURE: 98.4 F | BODY MASS INDEX: 28.77 KG/M2 | HEIGHT: 67 IN | WEIGHT: 183.3 LBS | RESPIRATION RATE: 18 BRPM | HEART RATE: 76 BPM | DIASTOLIC BLOOD PRESSURE: 62 MMHG | SYSTOLIC BLOOD PRESSURE: 122 MMHG | OXYGEN SATURATION: 93 %

## 2022-04-06 PROBLEM — J30.2 SEASONAL ALLERGIES: Status: ACTIVE | Noted: 2022-04-06

## 2022-04-06 LAB
ANION GAP SERPL CALCULATED.3IONS-SCNC: 10 MMOL/L (ref 5–15)
BASOPHILS # BLD AUTO: 0.05 10*3/MM3 (ref 0–0.2)
BASOPHILS NFR BLD AUTO: 0.4 % (ref 0–1.5)
BUN SERPL-MCNC: 5 MG/DL (ref 8–23)
BUN/CREAT SERPL: 6.4 (ref 7–25)
CALCIUM SPEC-SCNC: 8.4 MG/DL (ref 8.6–10.5)
CHLORIDE SERPL-SCNC: 104 MMOL/L (ref 98–107)
CO2 SERPL-SCNC: 24 MMOL/L (ref 22–29)
CREAT SERPL-MCNC: 0.78 MG/DL (ref 0.57–1)
DEPRECATED RDW RBC AUTO: 41.6 FL (ref 37–54)
EGFRCR SERPLBLD CKD-EPI 2021: 80.3 ML/MIN/1.73
EOSINOPHIL # BLD AUTO: 0.32 10*3/MM3 (ref 0–0.4)
EOSINOPHIL NFR BLD AUTO: 2.4 % (ref 0.3–6.2)
ERYTHROCYTE [DISTWIDTH] IN BLOOD BY AUTOMATED COUNT: 12 % (ref 12.3–15.4)
GLUCOSE BLDC GLUCOMTR-MCNC: 101 MG/DL (ref 70–130)
GLUCOSE BLDC GLUCOMTR-MCNC: 109 MG/DL (ref 70–130)
GLUCOSE SERPL-MCNC: 180 MG/DL (ref 65–99)
HCT VFR BLD AUTO: 33.1 % (ref 34–46.6)
HGB BLD-MCNC: 11 G/DL (ref 12–15.9)
IMM GRANULOCYTES # BLD AUTO: 0.14 10*3/MM3 (ref 0–0.05)
IMM GRANULOCYTES NFR BLD AUTO: 1 % (ref 0–0.5)
LYMPHOCYTES # BLD AUTO: 2.31 10*3/MM3 (ref 0.7–3.1)
LYMPHOCYTES NFR BLD AUTO: 17.2 % (ref 19.6–45.3)
MAGNESIUM SERPL-MCNC: 1.8 MG/DL (ref 1.6–2.4)
MCH RBC QN AUTO: 31.5 PG (ref 26.6–33)
MCHC RBC AUTO-ENTMCNC: 33.2 G/DL (ref 31.5–35.7)
MCV RBC AUTO: 94.8 FL (ref 79–97)
MONOCYTES # BLD AUTO: 1.11 10*3/MM3 (ref 0.1–0.9)
MONOCYTES NFR BLD AUTO: 8.3 % (ref 5–12)
NEUTROPHILS NFR BLD AUTO: 70.7 % (ref 42.7–76)
NEUTROPHILS NFR BLD AUTO: 9.5 10*3/MM3 (ref 1.7–7)
NRBC BLD AUTO-RTO: 0 /100 WBC (ref 0–0.2)
PHOSPHATE SERPL-MCNC: 2.4 MG/DL (ref 2.5–4.5)
PLATELET # BLD AUTO: 365 10*3/MM3 (ref 140–450)
PMV BLD AUTO: 10.2 FL (ref 6–12)
POTASSIUM SERPL-SCNC: 3.8 MMOL/L (ref 3.5–5.2)
RBC # BLD AUTO: 3.49 10*6/MM3 (ref 3.77–5.28)
SODIUM SERPL-SCNC: 138 MMOL/L (ref 136–145)
WBC NRBC COR # BLD: 13.43 10*3/MM3 (ref 3.4–10.8)

## 2022-04-06 PROCEDURE — 96372 THER/PROPH/DIAG INJ SC/IM: CPT

## 2022-04-06 PROCEDURE — 82962 GLUCOSE BLOOD TEST: CPT

## 2022-04-06 PROCEDURE — 94799 UNLISTED PULMONARY SVC/PX: CPT

## 2022-04-06 PROCEDURE — 84100 ASSAY OF PHOSPHORUS: CPT | Performed by: STUDENT IN AN ORGANIZED HEALTH CARE EDUCATION/TRAINING PROGRAM

## 2022-04-06 PROCEDURE — 36415 COLL VENOUS BLD VENIPUNCTURE: CPT | Performed by: STUDENT IN AN ORGANIZED HEALTH CARE EDUCATION/TRAINING PROGRAM

## 2022-04-06 PROCEDURE — 83735 ASSAY OF MAGNESIUM: CPT | Performed by: STUDENT IN AN ORGANIZED HEALTH CARE EDUCATION/TRAINING PROGRAM

## 2022-04-06 PROCEDURE — 25010000002 CEFTRIAXONE PER 250 MG

## 2022-04-06 PROCEDURE — 96361 HYDRATE IV INFUSION ADD-ON: CPT

## 2022-04-06 PROCEDURE — 99285 EMERGENCY DEPT VISIT HI MDM: CPT

## 2022-04-06 PROCEDURE — G0378 HOSPITAL OBSERVATION PER HR: HCPCS

## 2022-04-06 PROCEDURE — 96376 TX/PRO/DX INJ SAME DRUG ADON: CPT

## 2022-04-06 PROCEDURE — 94761 N-INVAS EAR/PLS OXIMETRY MLT: CPT

## 2022-04-06 PROCEDURE — 25010000002 ENOXAPARIN PER 10 MG

## 2022-04-06 PROCEDURE — 85025 COMPLETE CBC W/AUTO DIFF WBC: CPT | Performed by: STUDENT IN AN ORGANIZED HEALTH CARE EDUCATION/TRAINING PROGRAM

## 2022-04-06 PROCEDURE — 80048 BASIC METABOLIC PNL TOTAL CA: CPT | Performed by: STUDENT IN AN ORGANIZED HEALTH CARE EDUCATION/TRAINING PROGRAM

## 2022-04-06 RX ORDER — CEFDINIR 300 MG/1
300 CAPSULE ORAL 2 TIMES DAILY
Qty: 6 CAPSULE | Refills: 0 | Status: SHIPPED | OUTPATIENT
Start: 2022-04-06 | End: 2022-04-09

## 2022-04-06 RX ORDER — CEFDINIR 300 MG/1
300 CAPSULE ORAL 2 TIMES DAILY
Qty: 6 CAPSULE | Refills: 0 | Status: SHIPPED | OUTPATIENT
Start: 2022-04-06 | End: 2022-04-06 | Stop reason: SDUPTHER

## 2022-04-06 RX ADMIN — FLUTICASONE PROPIONATE 2 SPRAY: 50 SPRAY, METERED NASAL at 09:46

## 2022-04-06 RX ADMIN — SODIUM CHLORIDE 125 ML/HR: 9 INJECTION, SOLUTION INTRAVENOUS at 02:47

## 2022-04-06 RX ADMIN — PANTOPRAZOLE SODIUM 40 MG: 40 TABLET, DELAYED RELEASE ORAL at 06:22

## 2022-04-06 RX ADMIN — ENOXAPARIN SODIUM 40 MG: 100 INJECTION SUBCUTANEOUS at 08:16

## 2022-04-06 RX ADMIN — ACETAMINOPHEN 650 MG: 325 TABLET, FILM COATED ORAL at 07:28

## 2022-04-06 RX ADMIN — CALCIUM CARBONATE-VITAMIN D TAB 500 MG-200 UNIT 1 TABLET: 500-200 TAB at 09:46

## 2022-04-06 RX ADMIN — WATER 2 G: 100 INJECTION, SOLUTION INTRAVENOUS at 00:14

## 2022-04-06 RX ADMIN — SPIRONOLACTONE 100 MG: 100 TABLET, FILM COATED ORAL at 09:46

## 2022-04-06 RX ADMIN — MONTELUKAST SODIUM 10 MG: 10 TABLET, FILM COATED ORAL at 09:46

## 2022-04-06 RX ADMIN — CETIRIZINE HYDROCHLORIDE 10 MG: 10 TABLET ORAL at 09:46

## 2022-04-06 RX ADMIN — CITALOPRAM 20 MG: 20 TABLET, FILM COATED ORAL at 09:46

## 2022-04-06 RX ADMIN — BUDESONIDE AND FORMOTEROL FUMARATE DIHYDRATE 2 PUFF: 160; 4.5 AEROSOL RESPIRATORY (INHALATION) at 07:21

## 2022-04-06 RX ADMIN — TIOTROPIUM BROMIDE INHALATION SPRAY 2 PUFF: 3.12 SPRAY, METERED RESPIRATORY (INHALATION) at 07:21

## 2022-04-06 RX ADMIN — BENZONATATE 100 MG: 100 CAPSULE ORAL at 08:16

## 2022-04-06 NOTE — PLAN OF CARE
Goal Outcome Evaluation:  Plan of Care Reviewed With: patient        Progress: no change  Outcome Evaluation: A&O. RA/2L NC. SR. Up with standby. IVF and IV abx per order. Voiding per BR. No complaints of pain. Will continue to monitor.

## 2022-04-06 NOTE — OUTREACH NOTE
Prep Survey    Flowsheet Row Responses   [facility] patient discharged from? [place]   Is LACE score < 7 ? Yes   Emergency Room discharge w/ pulse ox? No   Eligibility [facility]   Date of Admission 04/04/22   Date of Discharge 04/06/22   Discharge Disposition Home or Self Care   Discharge diagnosis Right upper lobe pneumonia   Does the patient have one of the following disease processes/diagnoses(primary or secondary)? COPD/Pneumonia   Does the patient have Home health ordered? No   Is there a DME ordered? No   Prep survey completed? Yes          CRICKET LOOMIS - Registered Nurse

## 2022-04-07 ENCOUNTER — TRANSITIONAL CARE MANAGEMENT TELEPHONE ENCOUNTER (OUTPATIENT)
Dept: CALL CENTER | Facility: HOSPITAL | Age: 72
End: 2022-04-07

## 2022-04-07 NOTE — OUTREACH NOTE
Call Center TCM Note    Flowsheet Row Responses   [facility] patient discharged from? [place]   Does the patient have one of the following disease processes/diagnoses(primary or secondary)? COPD/Pneumonia   Was the primary reason for admission: Pneumonia   TCM attempt successful? Yes   Call start time 1126   Call end time 1133   Discharge diagnosis Right upper lobe pneumonia   Meds reviewed with patient/caregiver? Yes   Is the patient having any side effects they believe may be caused by any medication additions or changes? No   Does the patient have all medications ordered at discharge? Yes   Is the patient taking all medications as directed (includes completed medication regime)? Yes   Does the patient have a primary care provider?  Yes   Does the patient have an appointment with their PCP or specialist within 7 days of discharge? No   Comments regarding PCP Pt declined HOSP DC FU  appt offered and states she will call to make her appt.    What is preventing the patient from scheduling follow up appointments within 7 days of discharge? --  [Wants to make herself appt. ]   Nursing Interventions Advised patient to make appointment, Educated patient on importance of making appointment   Has the patient kept scheduled appointments due by today? N/A   Pulse Ox monitoring None   Psychosocial issues? No   Did the patient receive a copy of their discharge instructions? Yes   Nursing interventions Reviewed instructions with patient   What is the patient's perception of their health status since discharge? Improving  [however still coughing . Cough is productive. ]   Nursing Interventions Nurse provided patient education   Is the patient/caregiver able to teach back the hierarchy of who to call/visit for symptoms/problems? PCP, Specialist, Home health nurse, Urgent Care, ED, 911 Yes   Is the patient/caregiver able to teach back signs and symptoms of worsening condition: Fever/chills, Shortness of breath, Chest pain    Is the patient/caregiver able to teach back importance of completing antibiotic course of treatment? Yes   TCM call completed? Yes   Wrap up additional comments Pt upset that she left 2 inhalers that had been used in the hospital room. Pt asked that this nurse call to see if they still have the inhalers. DID call , however room had already been cleaned and uncertaing were inhalers are , possible disposed of un used med. Pt does have meds at home just wanted the ones she had in hospital as well. Pt declined PCP HOSP [facility] stating she will make her own appt.           Darlene Plummer RN    4/7/2022, 11:38 EDT

## 2022-04-09 LAB
BACTERIA SPEC AEROBE CULT: NORMAL
BACTERIA SPEC AEROBE CULT: NORMAL

## 2022-04-13 ENCOUNTER — OFFICE VISIT (OUTPATIENT)
Dept: INTERNAL MEDICINE | Facility: CLINIC | Age: 72
End: 2022-04-13

## 2022-04-13 VITALS
WEIGHT: 187 LBS | SYSTOLIC BLOOD PRESSURE: 124 MMHG | TEMPERATURE: 97.3 F | DIASTOLIC BLOOD PRESSURE: 68 MMHG | BODY MASS INDEX: 29.35 KG/M2 | HEART RATE: 80 BPM | HEIGHT: 67 IN

## 2022-04-13 DIAGNOSIS — D72.829 LEUKOCYTOSIS, UNSPECIFIED TYPE: Primary | ICD-10-CM

## 2022-04-13 DIAGNOSIS — J18.9 PNEUMONIA OF RIGHT UPPER LOBE DUE TO INFECTIOUS ORGANISM: ICD-10-CM

## 2022-04-13 DIAGNOSIS — R73.03 PREDIABETES: ICD-10-CM

## 2022-04-13 PROCEDURE — 99496 TRANSJ CARE MGMT HIGH F2F 7D: CPT | Performed by: INTERNAL MEDICINE

## 2022-04-13 PROCEDURE — 1111F DSCHRG MED/CURRENT MED MERGE: CPT | Performed by: INTERNAL MEDICINE

## 2022-04-13 NOTE — PROGRESS NOTES
Transitional Care Follow Up Visit  Subjective     Che Landaverde is a 71 y.o. female who presents for a transitional care management visit.    Within 48 business hours after discharge our office contacted her via telephone to coordinate her care and needs.      I reviewed and discussed the details of that call along with the discharge summary, hospital problems, inpatient lab results, inpatient diagnostic studies, and consultation reports with Che.     Current outpatient and discharge medications have been reconciled for the patient.  Reviewed by: Myrna Tafoya MD      Date of TCM Phone Call 4/6/2022   [facility]   Date of Admission 4/4/2022   Date of Discharge 4/6/2022   Discharge Disposition Home or Self Care     Risk for Readmission (LACE) Score: 5 (4/6/2022  6:01 AM)      History of Present Illness   Course During Hospital Stay:  Quick response to IV ceftriaxone for RUL: pneumonia.  Had a quick onset of illness, SOB- admitted thru ER.    She has been using IS since home, increasing her activity.      The following portions of the patient's history were reviewed and updated as appropriate: allergies, current medications, past family history, past medical history, past social history, past surgical history and problem list.    Review of Systems   Constitutional: Negative for activity change, appetite change and unexpected weight change.   HENT: Negative for trouble swallowing.    Respiratory: Negative for shortness of breath. Cough: just over baseline.    Cardiovascular: Negative for chest pain and leg swelling.   Gastrointestinal: Negative for abdominal pain and diarrhea.   Genitourinary: Negative for difficulty urinating.   Musculoskeletal: Negative for arthralgias.   Psychiatric/Behavioral: Negative for dysphoric mood.       Objective   Physical Exam  Constitutional:       General: She is not in acute distress.     Appearance: Normal appearance.   Cardiovascular:      Rate and Rhythm:  Normal rate and regular rhythm.   Pulmonary:      Effort: Pulmonary effort is normal. No respiratory distress.      Breath sounds: No wheezing.   Lymphadenopathy:      Cervical: No cervical adenopathy.         Assessment/Plan   Diagnoses and all orders for this visit:    1. Leukocytosis, unspecified type (Primary)  Comments:  likely due to pneumonia- will recheck today to insure resolution as she is clinically better.   Orders:  -     CBC & Differential    2. Prediabetes  Comments:  check A1C, sugars up in hospital.   Orders:  -     Hemoglobin A1c    3. Pneumonia of right upper lobe due to infectious organism  Comments:  clinically much better- will do f/u CXR in 4 weeks.  Call with problems.   Orders:  -     XR Chest PA & Lateral; Future

## 2022-04-13 NOTE — PROGRESS NOTES
"Chief Complaint  Hospital Follow Up Visit (Pneumonia )    Subjective     {Problem List  Visit Diagnosis   Encounters  Notes  Medications  Labs  Result Review Imaging  Media :23}     Che MCKEE Layman presents to [facility]  History of Present Illness    Objective   Vital Signs:   Temp 97.3 °F (36.3 °C)   Ht 170.2 cm (67\")   Wt 84.8 kg (187 lb)   BMI 29.29 kg/m²     {BMI is above normal parameters. Recommendations (Optional):7556251711}       Physical Exam   Result Review :{Labs  Result Review  Imaging  Med Tab  Media  Procedures :23}   {The following data was reviewed by (Optional):22174}  {Ambulatory Labs (Optional):49732}  {Data reviewed (Optional):18700:::1}          Assessment and Plan {CC Problem List  Visit Diagnosis   ROS  Review (Popup)  Health Maintenance  Quality  BestPractice  Medications  SmartSets  SnapShot Encounters  Media :23}   There are no diagnoses linked to this encounter.  {Time Spent (Optional):22897}  Follow Up {Instructions Charge Capture  Follow-up Communications :23}  No follow-ups on file.  Patient was given instructions and counseling regarding her condition or for health maintenance advice. Please see specific information pulled into the AVS if appropriate.       "

## 2022-04-14 DIAGNOSIS — E11.9 TYPE 2 DIABETES MELLITUS WITHOUT COMPLICATION, WITHOUT LONG-TERM CURRENT USE OF INSULIN: Primary | ICD-10-CM

## 2022-04-14 DIAGNOSIS — I10 ESSENTIAL HYPERTENSION: ICD-10-CM

## 2022-04-14 DIAGNOSIS — D72.829 LEUKOCYTOSIS, UNSPECIFIED TYPE: ICD-10-CM

## 2022-04-14 LAB
BASOPHILS # BLD AUTO: 0.1 X10E3/UL (ref 0–0.2)
BASOPHILS NFR BLD AUTO: 1 %
EOSINOPHIL # BLD AUTO: 0.3 X10E3/UL (ref 0–0.4)
EOSINOPHIL NFR BLD AUTO: 3 %
ERYTHROCYTE [DISTWIDTH] IN BLOOD BY AUTOMATED COUNT: 11.9 % (ref 11.7–15.4)
HBA1C MFR BLD: 6.8 % (ref 4.8–5.6)
HCT VFR BLD AUTO: 38.5 % (ref 34–46.6)
HGB BLD-MCNC: 12.9 G/DL (ref 11.1–15.9)
IMM GRANULOCYTES # BLD AUTO: 0.1 X10E3/UL (ref 0–0.1)
IMM GRANULOCYTES NFR BLD AUTO: 1 %
LYMPHOCYTES # BLD AUTO: 2.9 X10E3/UL (ref 0.7–3.1)
LYMPHOCYTES NFR BLD AUTO: 28 %
MCH RBC QN AUTO: 31.1 PG (ref 26.6–33)
MCHC RBC AUTO-ENTMCNC: 33.5 G/DL (ref 31.5–35.7)
MCV RBC AUTO: 93 FL (ref 79–97)
MONOCYTES # BLD AUTO: 0.9 X10E3/UL (ref 0.1–0.9)
MONOCYTES NFR BLD AUTO: 8 %
NEUTROPHILS # BLD AUTO: 6.4 X10E3/UL (ref 1.4–7)
NEUTROPHILS NFR BLD AUTO: 59 %
PLATELET # BLD AUTO: 777 X10E3/UL (ref 150–450)
RBC # BLD AUTO: 4.15 X10E6/UL (ref 3.77–5.28)
WBC # BLD AUTO: 10.6 X10E3/UL (ref 3.4–10.8)

## 2022-05-14 ENCOUNTER — HOSPITAL ENCOUNTER (OUTPATIENT)
Dept: GENERAL RADIOLOGY | Facility: HOSPITAL | Age: 72
Discharge: HOME | End: 2022-05-14
Admitting: INTERNAL MEDICINE

## 2022-05-14 DIAGNOSIS — J18.9 PNEUMONIA OF RIGHT UPPER LOBE DUE TO INFECTIOUS ORGANISM: ICD-10-CM

## 2022-05-14 PROCEDURE — 71046 X-RAY EXAM CHEST 2 VIEWS: CPT

## 2022-06-10 RX ORDER — BUDESONIDE AND FORMOTEROL FUMARATE DIHYDRATE 160; 4.5 UG/1; UG/1
AEROSOL RESPIRATORY (INHALATION)
Qty: 3 EACH | Refills: 1 | Status: SHIPPED | OUTPATIENT
Start: 2022-06-10 | End: 2023-02-21

## 2022-06-15 RX ORDER — SPIRONOLACTONE 100 MG/1
TABLET, FILM COATED ORAL
Qty: 90 TABLET | Refills: 1 | Status: SHIPPED | OUTPATIENT
Start: 2022-06-15 | End: 2023-01-23

## 2022-07-06 DIAGNOSIS — I10 ESSENTIAL HYPERTENSION: ICD-10-CM

## 2022-07-06 RX ORDER — AMITRIPTYLINE HYDROCHLORIDE 10 MG/1
TABLET, FILM COATED ORAL
Qty: 180 TABLET | Refills: 1 | Status: SHIPPED | OUTPATIENT
Start: 2022-07-06 | End: 2023-02-13

## 2022-07-07 RX ORDER — CITALOPRAM 20 MG/1
TABLET ORAL
Qty: 90 TABLET | Refills: 2 | Status: SHIPPED | OUTPATIENT
Start: 2022-07-07

## 2022-07-19 DIAGNOSIS — G43.709 CHRONIC MIGRAINE WITHOUT AURA WITHOUT STATUS MIGRAINOSUS, NOT INTRACTABLE: ICD-10-CM

## 2022-07-19 RX ORDER — FLUTICASONE PROPIONATE 50 MCG
SPRAY, SUSPENSION (ML) NASAL
Qty: 48 G | Refills: 1 | Status: SHIPPED | OUTPATIENT
Start: 2022-07-19

## 2022-07-19 RX ORDER — ALBUTEROL SULFATE 90 UG/1
AEROSOL, METERED RESPIRATORY (INHALATION)
Qty: 54 G | Refills: 1 | Status: SHIPPED | OUTPATIENT
Start: 2022-07-19

## 2022-07-19 RX ORDER — GABAPENTIN 300 MG/1
CAPSULE ORAL
Qty: 180 CAPSULE | Refills: 1 | Status: SHIPPED | OUTPATIENT
Start: 2022-07-19 | End: 2022-10-26

## 2022-07-20 ENCOUNTER — OFFICE VISIT (OUTPATIENT)
Dept: INTERNAL MEDICINE | Facility: CLINIC | Age: 72
End: 2022-07-20

## 2022-07-20 ENCOUNTER — HOSPITAL ENCOUNTER (OUTPATIENT)
Dept: GENERAL RADIOLOGY | Facility: HOSPITAL | Age: 72
Discharge: HOME | End: 2022-07-20
Admitting: INTERNAL MEDICINE

## 2022-07-20 VITALS — HEIGHT: 67 IN | WEIGHT: 170 LBS | BODY MASS INDEX: 26.68 KG/M2 | TEMPERATURE: 97.3 F

## 2022-07-20 DIAGNOSIS — R05.9 COUGH: ICD-10-CM

## 2022-07-20 DIAGNOSIS — J44.1 COPD EXACERBATION: Primary | ICD-10-CM

## 2022-07-20 PROBLEM — J18.9 PNEUMONIA OF RIGHT UPPER LOBE DUE TO INFECTIOUS ORGANISM: Status: RESOLVED | Noted: 2022-04-05 | Resolved: 2022-07-20

## 2022-07-20 PROCEDURE — 71046 X-RAY EXAM CHEST 2 VIEWS: CPT

## 2022-07-20 PROCEDURE — 99214 OFFICE O/P EST MOD 30 MIN: CPT | Performed by: INTERNAL MEDICINE

## 2022-07-20 RX ORDER — PREDNISONE 20 MG/1
TABLET ORAL
Qty: 18 TABLET | Refills: 0 | Status: SHIPPED | OUTPATIENT
Start: 2022-07-20 | End: 2022-08-04

## 2022-07-20 NOTE — PROGRESS NOTES
"Chief Complaint  Choking (Last evening )    Subjective        Che MCKEE Layfifi presents to [facility] PRIMARY CARE  History of Present Illness she has been wheezing and cough a bit more the last day.  Started 10 days ago, took an old medrol dose pack with good results.  She has been taking Mucinex but cough is not productive.  Last night had a choking episode when she was eating- she was doing a lot of talking, etc. Was eventually able to cough enough to get up whatever it was- she did not get Heimlich. Today feels almost back to normal with her coughing, etc. They checked her O2- down in 89%, ultimately up over 92 and even higher this am.    Weight loss intentional.   On presentation her O2 was 90%, after MN treatment and rest for a few minutes, improved to 94%    Objective   Vital Signs:  Temp 97.3 °F (36.3 °C)   Ht 170.2 cm (67\")   Wt 77.1 kg (170 lb)   BMI 26.63 kg/m²   Estimated body mass index is 26.63 kg/m² as calculated from the following:    Height as of this encounter: 170.2 cm (67\").    Weight as of this encounter: 77.1 kg (170 lb).          Physical Exam  Constitutional:       General: She is not in acute distress.     Appearance: Normal appearance.   Cardiovascular:      Rate and Rhythm: Normal rate and regular rhythm.   Pulmonary:      Effort: Pulmonary effort is normal. No respiratory distress.      Breath sounds: Wheezing (much improvement with the albuterol tx) present.   Musculoskeletal:      Right lower leg: No edema.      Left lower leg: No edema.        Result Review :                Assessment and Plan   Diagnoses and all orders for this visit:    1. COPD exacerbation (HCC) (Primary)  Comments:  needs longer steroid taper.  Also refer back to Dr. Edwards, I am concerned about the hypoxemia.  Check CXR.   Orders:  -     XR Chest PA & Lateral; Future             Follow Up   No follow-ups on file.  Patient was given instructions and counseling regarding her condition or for " health maintenance advice. Please see specific information pulled into the AVS if appropriate.

## 2022-07-28 DIAGNOSIS — G43.709 CHRONIC MIGRAINE WITHOUT AURA WITHOUT STATUS MIGRAINOSUS, NOT INTRACTABLE: ICD-10-CM

## 2022-07-28 RX ORDER — SUMATRIPTAN 100 MG/1
TABLET, FILM COATED ORAL
Qty: 9 TABLET | Refills: 3 | Status: SHIPPED | OUTPATIENT
Start: 2022-07-28 | End: 2022-08-15

## 2022-08-10 RX ORDER — MONTELUKAST SODIUM 10 MG/1
TABLET ORAL
Qty: 90 TABLET | Refills: 1 | Status: SHIPPED | OUTPATIENT
Start: 2022-08-10 | End: 2023-03-20

## 2022-08-13 DIAGNOSIS — G43.709 CHRONIC MIGRAINE WITHOUT AURA WITHOUT STATUS MIGRAINOSUS, NOT INTRACTABLE: ICD-10-CM

## 2022-08-15 ENCOUNTER — HOSPITAL ENCOUNTER (OUTPATIENT)
Dept: CT IMAGING | Facility: HOSPITAL | Age: 72
Discharge: HOME | End: 2022-08-15
Admitting: INTERNAL MEDICINE

## 2022-08-15 DIAGNOSIS — R91.1 PULMONARY NODULE: ICD-10-CM

## 2022-08-15 PROCEDURE — 71250 CT THORAX DX C-: CPT

## 2022-08-15 RX ORDER — SUMATRIPTAN 100 MG/1
TABLET, FILM COATED ORAL
Qty: 9 TABLET | Refills: 3 | Status: SHIPPED | OUTPATIENT
Start: 2022-08-15

## 2022-10-03 ENCOUNTER — OFFICE VISIT (OUTPATIENT)
Dept: INTERNAL MEDICINE | Facility: CLINIC | Age: 72
End: 2022-10-03

## 2022-10-03 VITALS
TEMPERATURE: 97.3 F | HEIGHT: 67 IN | WEIGHT: 172 LBS | HEART RATE: 76 BPM | DIASTOLIC BLOOD PRESSURE: 74 MMHG | SYSTOLIC BLOOD PRESSURE: 126 MMHG | BODY MASS INDEX: 27 KG/M2

## 2022-10-03 DIAGNOSIS — I10 ESSENTIAL HYPERTENSION: ICD-10-CM

## 2022-10-03 DIAGNOSIS — Z00.00 MEDICARE ANNUAL WELLNESS VISIT, SUBSEQUENT: ICD-10-CM

## 2022-10-03 DIAGNOSIS — T14.8XXA NONHEALING NONSURGICAL WOUND LIMITED TO BREAKDOWN OF SKIN: Primary | ICD-10-CM

## 2022-10-03 DIAGNOSIS — E11.9 TYPE 2 DIABETES MELLITUS WITHOUT COMPLICATION, WITHOUT LONG-TERM CURRENT USE OF INSULIN: ICD-10-CM

## 2022-10-03 DIAGNOSIS — Z23 NEED FOR INFLUENZA VACCINATION: ICD-10-CM

## 2022-10-03 PROBLEM — J18.9 SEPSIS DUE TO PNEUMONIA: Status: RESOLVED | Noted: 2022-04-05 | Resolved: 2022-10-03

## 2022-10-03 PROBLEM — A41.9 SEPSIS DUE TO PNEUMONIA: Status: RESOLVED | Noted: 2022-04-05 | Resolved: 2022-10-03

## 2022-10-03 PROBLEM — E87.1 HYPONATREMIA: Status: RESOLVED | Noted: 2022-04-05 | Resolved: 2022-10-03

## 2022-10-03 PROCEDURE — G0439 PPPS, SUBSEQ VISIT: HCPCS | Performed by: INTERNAL MEDICINE

## 2022-10-03 PROCEDURE — 1170F FXNL STATUS ASSESSED: CPT | Performed by: INTERNAL MEDICINE

## 2022-10-03 PROCEDURE — 1159F MED LIST DOCD IN RCRD: CPT | Performed by: INTERNAL MEDICINE

## 2022-10-03 PROCEDURE — 90662 IIV NO PRSV INCREASED AG IM: CPT | Performed by: INTERNAL MEDICINE

## 2022-10-03 PROCEDURE — 96160 PT-FOCUSED HLTH RISK ASSMT: CPT | Performed by: INTERNAL MEDICINE

## 2022-10-03 PROCEDURE — G0008 ADMIN INFLUENZA VIRUS VAC: HCPCS | Performed by: INTERNAL MEDICINE

## 2022-10-03 NOTE — PROGRESS NOTES
The ABCs of the Annual Wellness Visit  Subsequent Medicare Wellness Visit    Chief Complaint   Patient presents with   • Medicare Wellness-subsequent      Subjective    History of Present Illness:  Che Landaverde is a 72 y.o. female who presents for a Subsequent Medicare Wellness Visit.  Slipped on a ladder- cut both shins 1 month ago- Has been changing dressing daily and using Bactroban ointment daily.  Doesn't look much better.   Coughing has been great lately- not sure why  Taking gabapentin BID with good results.   Has been watching her diet to control BS.     The following portions of the patient's history were reviewed and   updated as appropriate: allergies, current medications, past family history, past medical history, past social history, past surgical history and problem list.    Compared to one year ago, the patient feels her physical   health is the same.    Compared to one year ago, the patient feels her mental   health is the same.    Recent Hospitalizations:  This patient has had a [facility] admission record on file within the last 365 days.    Current Medical Providers:  Patient Care Team:  Myrna Tafoya MD as PCP - General (Internal Medicine)  Iza Porter MD as Consulting Physician (Obstetrics and Gynecology)  Ramon Edwards MD as Consulting Physician (Pulmonary Disease)  Bryce Navarro MD as Consulting Physician (Otolaryngology)    Outpatient Medications Prior to Visit   Medication Sig Dispense Refill   • alendronate (FOSAMAX) 70 MG tablet Take 70 mg by mouth Daily. Pt takes 1 pill once a week.     • amitriptyline (ELAVIL) 10 MG tablet TAKE 2 TABLETS EVERY NIGHT AT BEDTIME 180 tablet 1   • calcium citrate-vitamin d (CITRACAL) 200-250 MG-UNIT tablet tablet Take  by mouth.     • Cetirizine HCl 10 MG capsule Take  by mouth daily.     • citalopram (CeleXA) 20 MG tablet TAKE 1 TABLET EVERY DAY 90 tablet 2   • colestipol (COLESTID) 1 g tablet Pt taking 2 tabs po daily 180 tablet 3   •  dapsone 25 MG tablet Take 25 mg by mouth Daily. May take 2 if  rash returns     • fluticasone (FLONASE) 50 MCG/ACT nasal spray USE 2 SPRAYS IN EACH NOSTRIL EVERY DAY 48 g 1   • gabapentin (NEURONTIN) 300 MG capsule TAKE 1 CAPSULE TWICE DAILY 180 capsule 1   • magnesium oxide (MAGOX) 400 (241.3 Mg) MG tablet tablet Take 400 mg by mouth Daily.     • metoprolol tartrate (LOPRESSOR) 25 MG tablet TAKE 1 TABLET AT NIGHT 90 tablet 1   • montelukast (SINGULAIR) 10 MG tablet TAKE 1 TABLET EVERY DAY 90 tablet 1   • Multiple Vitamins-Minerals (MULTI COMPLETE PO) Take  by mouth.     • mupirocin (BACTROBAN) 2 % ointment Apply to affected area with each dressing change; change dressing at least once a day. 22 g 0   • Omega-3 Fatty Acids (FISH OIL) 1200 MG capsule capsule Take  by mouth.     • omeprazole (priLOSEC) 40 MG capsule TAKE 1 CAPSULE EVERY DAY 90 capsule 3   • polycarbophil (FIBERCON) 625 MG tablet Take 625 mg by mouth as needed.     • Probiotic capsule Take  by mouth.     • spironolactone (ALDACTONE) 100 MG tablet TAKE 1 TABLET EVERY DAY 90 tablet 1   • SUMAtriptan (IMITREX) 100 MG tablet TAKE 1 TABLET BY MOUTH 1 (ONE) TIME AS NEEDED FOR MIGRAINE FOR UP TO 1 DOSE. 9 tablet 3   • Symbicort 160-4.5 MCG/ACT inhaler INHALE 2 PUFFS EVERY 12 HOURS 3 each 1   • tiotropium (Spiriva HandiHaler) 18 MCG per inhalation capsule Place 1 capsule into inhaler and inhale Daily. 30 capsule 0   • valACYclovir (VALTREX) 500 MG tablet      • Ventolin  (90 Base) MCG/ACT inhaler INHALE 2 PUFFS EVERY 4 HOURS AS NEEDED FOR WHEEZING 54 g 1   • Breo Ellipta 100-25 MCG/INH inhaler        No facility-administered medications prior to visit.       No opioid medication identified on active medication list. I have reviewed chart for other potential  high risk medication/s and harmful drug interactions in the elderly.          Aspirin is not on active medication list.  Aspirin use is not indicated based on review of current medical condition/s.  "Risk of harm outweighs potential benefits.  .    Patient Active Problem List   Diagnosis   • Moderate persistent asthma without complication   • Gastroesophageal reflux disease   • Hoarseness   • Essential hypertension   • Migraine   • Dupuytren's contracture   • Mixed simple and mucopurulent chronic bronchitis (HCC)   • Type 2 diabetes mellitus with hyperglycemia, without long-term current use of insulin (HCC)   • Chronic left-sided low back pain without sciatica   • Tachycardia   • Seasonal allergies     Advance Care Planning  Advance Directive is on file.  ACP discussion was held with the patient during this visit. Patient has an advance directive in EMR which is still valid.     Review of Systems   HENT: Negative for congestion and trouble swallowing.    Eyes: Negative for visual disturbance.   Respiratory: Negative for cough and shortness of breath (doing great latelhy).    Cardiovascular: Negative for chest pain and leg swelling.   Gastrointestinal: Negative for abdominal pain.   Genitourinary: Negative for difficulty urinating.   Musculoskeletal: Negative for arthralgias and back pain.   Psychiatric/Behavioral: Negative for dysphoric mood.        Objective    Vitals:    10/03/22 0914   BP: 126/74   Pulse: 76   Temp: 97.3 °F (36.3 °C)   Weight: 78 kg (172 lb)   Height: 170.2 cm (67\")     Estimated body mass index is 26.94 kg/m² as calculated from the following:    Height as of this encounter: 170.2 cm (67\").    Weight as of this encounter: 78 kg (172 lb).    BMI is >= 25 and <30. (Overweight) The following options were offered after discussion;: exercise counseling/recommendations and nutrition counseling/recommendations      Does the patient have evidence of cognitive impairment? No    Physical Exam  Constitutional:       General: She is not in acute distress.     Appearance: Normal appearance. She is not ill-appearing.   Cardiovascular:      Rate and Rhythm: Normal rate and regular rhythm.      Pulses:       "     Dorsalis pedis pulses are 1+ on the right side and 1+ on the left side.        Posterior tibial pulses are 1+ on the right side and 1+ on the left side.   Pulmonary:      Effort: Pulmonary effort is normal.      Breath sounds: Normal breath sounds.   Feet:      Right foot:      Skin integrity: Skin integrity normal.      Left foot:      Skin integrity: Skin integrity normal.      Comments: Monofilament Test Performed      Lymphadenopathy:      Cervical: No cervical adenopathy.   Skin:     Comments: B shins with open wounds, clean, draining clear fluid.  Mild tenderness and induration R>L   Neurological:      General: No focal deficit present.       Lab Results   Component Value Date    CHLPL 212 (H) 2022    TRIG 176 (H) 2022    HDL 55 2022     (H) 2022    VLDL 31 2022    HGBA1C 6.30 (H) 2022            HEALTH RISK ASSESSMENT    Smoking Status:  Social History     Tobacco Use   Smoking Status Former Smoker   • Packs/day: 2.00   • Years: 44.00   • Pack years: 88.00   • Start date:    • Quit date:    • Years since quittin.7   Smokeless Tobacco Never Used   Tobacco Comment    smoked 44 years, 2 packs a day      Alcohol Consumption:  Social History     Substance and Sexual Activity   Alcohol Use Yes   • Alcohol/week: 2.0 standard drinks   • Types: 2 Cans of beer per week    Comment: socially     Fall Risk Screen:    STEADI Fall Risk Assessment was completed, and patient is at LOW risk for falls.Assessment completed on:10/3/2022    Depression Screening:  PHQ-2/PHQ-9 Depression Screening 10/3/2022   Retired PHQ-9 Total Score -   Retired Total Score -   Little Interest or Pleasure in Doing Things 0-->not at all   Feeling Down, Depressed or Hopeless 0-->not at all   PHQ-9: Brief Depression Severity Measure Score 0       Health Habits and Functional and Cognitive Screening:  Functional & Cognitive Status 10/3/2022   Do you have difficulty preparing food and eating?  No   Do you have difficulty bathing yourself, getting dressed or grooming yourself? No   Do you have difficulty using the toilet? No   Do you have difficulty moving around from place to place? No   Do you have trouble with steps or getting out of a bed or a chair? No   Current Diet Well Balanced Diet   Dental Exam Up to date   Eye Exam Up to date   Exercise (times per week) 0 times per week   Current Exercises Include No Regular Exercise   Current Exercise Activities Include -   Do you need help using the phone?  No   Are you deaf or do you have serious difficulty hearing?  No   Do you need help with transportation? No   Do you need help shopping? No   Do you need help preparing meals?  No   Do you need help with housework?  No   Do you need help with laundry? No   Do you need help taking your medications? No   Do you need help managing money? No   Do you ever drive or ride in a car without wearing a seat belt? No   Have you felt unusual stress, anger or loneliness in the last month? No   Who do you live with? Alone   If you need help, do you have trouble finding someone available to you? No   Have you been bothered in the last four weeks by sexual problems? No   Do you have difficulty concentrating, remembering or making decisions? No       Age-appropriate Screening Schedule:  Refer to the list below for future screening recommendations based on patient's age, sex and/or medical conditions. Orders for these recommended tests are listed in the plan section. The patient has been provided with a written plan.    Health Maintenance   Topic Date Due   • DIABETIC FOOT EXAM  Never done   • DIABETIC EYE EXAM  Never done   • URINE MICROALBUMIN  03/22/2023   • HEMOGLOBIN A1C  03/26/2023   • LIPID PANEL  09/26/2023   • MAMMOGRAM  10/21/2023   • DXA SCAN  11/08/2023   • TDAP/TD VACCINES (2 - Td or Tdap) 05/21/2030   • INFLUENZA VACCINE  Completed   • ZOSTER VACCINE  Completed              Assessment & Plan   CMS Preventative  Services Quick Reference  Risk Factors Identified During Encounter  DM eye exam  The above risks/problems have been discussed with the patient.  Follow up actions/plans if indicated are seen below in the Assessment/Plan Section.  Pertinent information has been shared with the patient in the After Visit Summary.    Diagnoses and all orders for this visit:    1. Nonhealing nonsurgical wound limited to breakdown of skin (Primary)  Comments:  referral placed to wound care  Orders:  -     Ambulatory Referral to Wound Clinic    2. Type 2 diabetes mellitus without complication, without long-term current use of insulin (HCC)  Comments:  A1C with some improvement, continue better diet plan. Due for eye exam.  Orders:  -     Comprehensive Metabolic Panel; Future  -     Hemoglobin A1c; Future    3. Need for influenza vaccination  -     Fluzone High-Dose 65+yrs ([id number])    4. Essential hypertension  Comments:  well controlled, no change.     5. Medicare annual wellness visit, subsequent  Comments:  to get Covid booster.  Continue watching diet, stay as active as possible.  Recheck 6m/prn. Appropriate distancing reviewed.         Follow Up:   Return in about 6 months (around 4/3/2023) for Recheck, Lab Before FUP.     An After Visit Summary and PPPS were made available to the patient.

## 2022-10-07 ENCOUNTER — OFFICE VISIT (OUTPATIENT)
Dept: WOUND CARE | Facility: HOSPITAL | Age: 72
End: 2022-10-07

## 2022-10-07 ENCOUNTER — TRANSCRIBE ORDERS (OUTPATIENT)
Dept: ADMINISTRATIVE | Facility: HOSPITAL | Age: 72
End: 2022-10-07

## 2022-10-07 DIAGNOSIS — L97.202: Primary | ICD-10-CM

## 2022-10-07 DIAGNOSIS — L97.822 NON-PRESSURE CHRONIC ULCER OF OTHER PART OF LEFT LOWER LEG WITH FAT LAYER EXPOSED: ICD-10-CM

## 2022-10-07 PROCEDURE — 97602 WOUND(S) CARE NON-SELECTIVE: CPT

## 2022-10-07 PROCEDURE — G0463 HOSPITAL OUTPT CLINIC VISIT: HCPCS

## 2022-10-11 ENCOUNTER — HOSPITAL ENCOUNTER (OUTPATIENT)
Dept: CARDIOLOGY | Facility: HOSPITAL | Age: 72
Discharge: HOME | End: 2022-10-11
Admitting: SURGERY

## 2022-10-11 DIAGNOSIS — L97.202: ICD-10-CM

## 2022-10-11 DIAGNOSIS — L97.822 NON-PRESSURE CHRONIC ULCER OF OTHER PART OF LEFT LOWER LEG WITH FAT LAYER EXPOSED: ICD-10-CM

## 2022-10-11 LAB
BH CV LOWER ARTERIAL LEFT ABI RATIO: 0.63
BH CV LOWER ARTERIAL LEFT CALF RATIO: 0.76
BH CV LOWER ARTERIAL LEFT DORSALIS PEDIS SYS MAX: 88
BH CV LOWER ARTERIAL LEFT GREAT TOE SYS MAX: 68
BH CV LOWER ARTERIAL LEFT HIGH THIGH RATIO: 1.1
BH CV LOWER ARTERIAL LEFT HIGH THIGH SYS MAX: 160
BH CV LOWER ARTERIAL LEFT LOW THIGH RATIO: 0.99
BH CV LOWER ARTERIAL LEFT LOW THIGH SYS MAX: 144
BH CV LOWER ARTERIAL LEFT POPLITEAL SYS MAX: 110
BH CV LOWER ARTERIAL LEFT POST TIBIAL SYS MAX: 92
BH CV LOWER ARTERIAL LEFT TBI RATIO: 0.47
BH CV LOWER ARTERIAL RIGHT ABI RATIO: 0.84
BH CV LOWER ARTERIAL RIGHT CALF RATIO: 0.97
BH CV LOWER ARTERIAL RIGHT DORSALIS PEDIS SYS MAX: 116
BH CV LOWER ARTERIAL RIGHT GREAT TOE SYS MAX: 110
BH CV LOWER ARTERIAL RIGHT HIGH THIGH RATIO: 1.26
BH CV LOWER ARTERIAL RIGHT HIGH THIGH SYS MAX: 183
BH CV LOWER ARTERIAL RIGHT LOW THIGH RATIO: 0.9
BH CV LOWER ARTERIAL RIGHT LOW THIGH SYS MAX: 130
BH CV LOWER ARTERIAL RIGHT POPLITEAL SYS MAX: 141
BH CV LOWER ARTERIAL RIGHT POST TIBIAL SYS MAX: 122
BH CV LOWER ARTERIAL RIGHT TBI RATIO: 0.76
MAXIMAL PREDICTED HEART RATE: 146 BPM
STRESS TARGET HR: 124 BPM
UPPER ARTERIAL LEFT ARM BRACHIAL SYS MAX: 145 MMHG
UPPER ARTERIAL RIGHT ARM BRACHIAL SYS MAX: 140 MMHG

## 2022-10-11 PROCEDURE — 93923 UPR/LXTR ART STDY 3+ LVLS: CPT

## 2022-10-21 ENCOUNTER — OFFICE VISIT (OUTPATIENT)
Dept: WOUND CARE | Facility: HOSPITAL | Age: 72
End: 2022-10-21

## 2022-10-21 PROCEDURE — 97602 WOUND(S) CARE NON-SELECTIVE: CPT

## 2022-10-25 ENCOUNTER — APPOINTMENT (OUTPATIENT)
Dept: WOMENS IMAGING | Facility: HOSPITAL | Age: 72
End: 2022-10-25

## 2022-10-25 PROCEDURE — 77067 SCR MAMMO BI INCL CAD: CPT | Performed by: RADIOLOGY

## 2022-10-25 PROCEDURE — 77063 BREAST TOMOSYNTHESIS BI: CPT | Performed by: RADIOLOGY

## 2022-10-26 DIAGNOSIS — G43.709 CHRONIC MIGRAINE WITHOUT AURA WITHOUT STATUS MIGRAINOSUS, NOT INTRACTABLE: ICD-10-CM

## 2022-10-26 RX ORDER — GABAPENTIN 300 MG/1
CAPSULE ORAL
Qty: 180 CAPSULE | Refills: 1 | Status: SHIPPED | OUTPATIENT
Start: 2022-10-26

## 2022-11-04 ENCOUNTER — APPOINTMENT (OUTPATIENT)
Dept: WOUND CARE | Facility: HOSPITAL | Age: 72
End: 2022-11-04

## 2022-12-05 DIAGNOSIS — J18.9 PNEUMONIA OF RIGHT UPPER LOBE DUE TO INFECTIOUS ORGANISM: Primary | ICD-10-CM

## 2022-12-05 RX ORDER — HYDROCODONE BITARTRATE AND HOMATROPINE METHYLBROMIDE ORAL SOLUTION 5; 1.5 MG/5ML; MG/5ML
2.5 LIQUID ORAL EVERY 6 HOURS PRN
Qty: 120 ML | Refills: 0 | Status: SHIPPED | OUTPATIENT
Start: 2022-12-05

## 2022-12-06 ENCOUNTER — OFFICE VISIT (OUTPATIENT)
Dept: INTERNAL MEDICINE | Facility: CLINIC | Age: 72
End: 2022-12-06

## 2022-12-06 VITALS
OXYGEN SATURATION: 95 % | HEART RATE: 112 BPM | DIASTOLIC BLOOD PRESSURE: 72 MMHG | TEMPERATURE: 98.2 F | SYSTOLIC BLOOD PRESSURE: 120 MMHG

## 2022-12-06 DIAGNOSIS — R05.1 ACUTE COUGH: Primary | ICD-10-CM

## 2022-12-06 LAB
EXPIRATION DATE: NORMAL
EXPIRATION DATE: NORMAL
FLUAV AG NPH QL: NEGATIVE
FLUBV AG NPH QL: NEGATIVE
INTERNAL CONTROL: NORMAL
INTERNAL CONTROL: NORMAL
Lab: NORMAL
Lab: NORMAL
SARS-COV-2 AG UPPER RESP QL IA.RAPID: NOT DETECTED

## 2022-12-06 PROCEDURE — 87426 SARSCOV CORONAVIRUS AG IA: CPT | Performed by: PHYSICIAN ASSISTANT

## 2022-12-06 PROCEDURE — 87804 INFLUENZA ASSAY W/OPTIC: CPT | Performed by: PHYSICIAN ASSISTANT

## 2022-12-06 PROCEDURE — 99213 OFFICE O/P EST LOW 20 MIN: CPT | Performed by: PHYSICIAN ASSISTANT

## 2022-12-06 NOTE — PROGRESS NOTES
Subjective   Chief Complaint   Patient presents with   • Cough       History of Present Illness     Cough started 2 days ago, has no fever or chills. No wheezing. Has been taking Mucinex. Is using her inhalers. Has only used her neb once.      Patient Active Problem List   Diagnosis   • Moderate persistent asthma without complication   • Gastroesophageal reflux disease   • Hoarseness   • Essential hypertension   • Migraine   • Dupuytren's contracture   • Mixed simple and mucopurulent chronic bronchitis (HCC)   • Type 2 diabetes mellitus with hyperglycemia, without long-term current use of insulin (HCC)   • Chronic left-sided low back pain without sciatica   • Tachycardia   • Seasonal allergies       Allergies   Allergen Reactions   • Gluten Meal Other (See Comments)     Blisters        Current Outpatient Medications on File Prior to Visit   Medication Sig Dispense Refill   • alendronate (FOSAMAX) 70 MG tablet Take 70 mg by mouth Daily. Pt takes 1 pill once a week.     • amitriptyline (ELAVIL) 10 MG tablet TAKE 2 TABLETS EVERY NIGHT AT BEDTIME 180 tablet 1   • calcium citrate-vitamin d (CITRACAL) 200-250 MG-UNIT tablet tablet Take  by mouth.     • Cetirizine HCl 10 MG capsule Take  by mouth daily.     • citalopram (CeleXA) 20 MG tablet TAKE 1 TABLET EVERY DAY 90 tablet 2   • colestipol (COLESTID) 1 g tablet Pt taking 2 tabs po daily 180 tablet 3   • dapsone 25 MG tablet Take 25 mg by mouth Daily. May take 2 if  rash returns     • fluticasone (FLONASE) 50 MCG/ACT nasal spray USE 2 SPRAYS IN EACH NOSTRIL EVERY DAY 48 g 1   • gabapentin (NEURONTIN) 300 MG capsule TAKE 1 CAPSULE TWICE DAILY 180 capsule 1   • HYDROcodone Bit-Homatrop MBr (HYCODAN) 5-1.5 MG/5ML solution Take 2.5 mL by mouth Every 6 (Six) Hours As Needed for Cough. 120 mL 0   • magnesium oxide (MAGOX) 400 (241.3 Mg) MG tablet tablet Take 400 mg by mouth Daily.     • metoprolol tartrate (LOPRESSOR) 25 MG tablet TAKE 1 TABLET AT NIGHT 90 tablet 1   •  montelukast (SINGULAIR) 10 MG tablet TAKE 1 TABLET EVERY DAY 90 tablet 1   • Multiple Vitamins-Minerals (MULTI COMPLETE PO) Take  by mouth.     • Omega-3 Fatty Acids (FISH OIL) 1200 MG capsule capsule Take  by mouth.     • omeprazole (priLOSEC) 40 MG capsule TAKE 1 CAPSULE EVERY DAY 90 capsule 3   • polycarbophil (FIBERCON) 625 MG tablet Take 625 mg by mouth as needed.     • Probiotic capsule Take  by mouth.     • spironolactone (ALDACTONE) 100 MG tablet TAKE 1 TABLET EVERY DAY 90 tablet 1   • SUMAtriptan (IMITREX) 100 MG tablet TAKE 1 TABLET BY MOUTH 1 (ONE) TIME AS NEEDED FOR MIGRAINE FOR UP TO 1 DOSE. 9 tablet 3   • Symbicort 160-4.5 MCG/ACT inhaler INHALE 2 PUFFS EVERY 12 HOURS 3 each 1   • tiotropium (Spiriva HandiHaler) 18 MCG per inhalation capsule Place 1 capsule into inhaler and inhale Daily. 30 capsule 0   • valACYclovir (VALTREX) 500 MG tablet      • Ventolin  (90 Base) MCG/ACT inhaler INHALE 2 PUFFS EVERY 4 HOURS AS NEEDED FOR WHEEZING 54 g 1   • [DISCONTINUED] Breo Ellipta 100-25 MCG/INH inhaler      • [DISCONTINUED] mupirocin (BACTROBAN) 2 % ointment Apply to affected area with each dressing change; change dressing at least once a day. 22 g 0     No current facility-administered medications on file prior to visit.       Past Medical History:   Diagnosis Date   • Aggressive former smoker    • Allergic rhinitis    • Anxiety    • Asthma    • Benign essential hypertension    • Cough    • Diarrhea    • Encounter for screening for lung cancer    • GERD (gastroesophageal reflux disease)    • Internal hemorrhoids    • Laceration of skin of lower leg    • Lung nodule    • Microscopic colitis    • Migraine, unspecified, not intractable, without status migrainosus    • Need for influenza vaccination    • Need for pneumococcal vaccination    • Osteoporosis    • Painful hip    • Pleural effusion 8/27/2020   • Reactive airway disease with wheezing with acute exacerbation    • Seasonal allergies    • Sleep  disturbances    • Smoking history        Family History   Problem Relation Age of Onset   • Heart failure Mother    • Emphysema Mother    • Other Sister         Brain tumor   • Lung cancer Brother        Social History     Socioeconomic History   • Marital status: Single   Tobacco Use   • Smoking status: Former     Packs/day: 2.00     Years: 44.00     Pack years: 88.00     Types: Cigarettes     Start date:      Quit date:      Years since quittin.9   • Smokeless tobacco: Never   • Tobacco comments:     smoked 44 years, 2 packs a day    Substance and Sexual Activity   • Alcohol use: Yes     Alcohol/week: 2.0 standard drinks     Types: 2 Cans of beer per week     Comment: socially   • Drug use: No   • Sexual activity: Not Currently       Past Surgical History:   Procedure Laterality Date   • BRAVO PROCEDURE N/A 2016    Procedure: ESOPHAGOGASTRODUODENOSCOPY AND BRAVO ;  Surgeon: Laila Trevino MD;  Location: [facility] ENDOSCOPY;  Service:    • CATARACT EXTRACTION  2015   • COLONOSCOPY  2015    ih 3/15 Dr. Trevino   • SIGMOIDOSCOPY N/A 2016    Procedure: SIGMOIDOSCOPY FLEXIBLE to transverse colon with biopsy;  Surgeon: Laila Trevino MD;  Location: [facility] ENDOSCOPY;  Service:      The following portions of the patient's history were reviewed and updated as appropriate: problem list, allergies, current medications, past medical history, past family history, past social history and past surgical history.    ROS     See HPI    Immunization History   Administered Date(s) Administered   • COVID-19 (PFIZER) PURPLE CAP 2021, 2021, 10/03/2021   • Covid-19 (Pfizer) Gray Cap 2022   • Fluad Quad 65+ 2020, 2021   • Fluzone High Dose =>65 Years ([employer] ONLY) 10/23/2018   • Fluzone High-Dose 65+yrs 10/03/2022   • Influenza, Unspecified 10/08/2019   • Pneumococcal Conjugate 13-Valent (PCV13) 2018   • Pneumococcal Polysaccharide (PPSV23) 10/01/2016   • Shingrix  03/10/2020, 06/06/2020   • Tdap 05/21/2020       Objective   Vitals:    12/06/22 1052 12/06/22 1115   BP:  120/72   Pulse: 112    Temp: 98.2 °F (36.8 °C)    SpO2: 95%      There is no height or weight on file to calculate BMI.  Physical Exam  Vitals reviewed.   Constitutional:       Appearance: Normal appearance.   HENT:      Head: Normocephalic and atraumatic.   Cardiovascular:      Rate and Rhythm: Normal rate and regular rhythm.   Pulmonary:      Effort: Pulmonary effort is normal.      Breath sounds: Normal breath sounds. No wheezing, rhonchi or rales.   Neurological:      Mental Status: She is alert.       Assessment & Plan   Diagnoses and all orders for this visit:    1. Acute cough (Primary)  -     POCT Influenza A/B  -     POCT SARS-CoV-2 Antigen AIRAM    Lungs are clear, no wheezing today. Start scheduled nebs, does not need steroids currently. Continue mucinex. Call if sx worsen. COVID and Flu are neg

## 2022-12-12 RX ORDER — OMEPRAZOLE 40 MG/1
CAPSULE, DELAYED RELEASE ORAL
Qty: 90 CAPSULE | Refills: 3 | Status: SHIPPED | OUTPATIENT
Start: 2022-12-12

## 2022-12-27 ENCOUNTER — OFFICE VISIT (OUTPATIENT)
Dept: INTERNAL MEDICINE | Facility: CLINIC | Age: 72
End: 2022-12-27

## 2022-12-27 ENCOUNTER — HOSPITAL ENCOUNTER (OUTPATIENT)
Dept: GENERAL RADIOLOGY | Facility: HOSPITAL | Age: 72
Discharge: HOME | End: 2022-12-27
Admitting: PHYSICIAN ASSISTANT

## 2022-12-27 VITALS
SYSTOLIC BLOOD PRESSURE: 120 MMHG | OXYGEN SATURATION: 95 % | TEMPERATURE: 98.3 F | DIASTOLIC BLOOD PRESSURE: 70 MMHG | HEART RATE: 95 BPM

## 2022-12-27 DIAGNOSIS — H43.393 VITREOUS FLOATERS OF BOTH EYES: ICD-10-CM

## 2022-12-27 DIAGNOSIS — H53.9 VISUAL DISTURBANCES: ICD-10-CM

## 2022-12-27 DIAGNOSIS — R53.83 OTHER FATIGUE: Primary | ICD-10-CM

## 2022-12-27 DIAGNOSIS — J45.41 MODERATE PERSISTENT ASTHMATIC BRONCHITIS WITH ACUTE EXACERBATION: ICD-10-CM

## 2022-12-27 LAB
EXPIRATION DATE: NORMAL
FLUAV AG UPPER RESP QL IA.RAPID: NOT DETECTED
FLUBV AG UPPER RESP QL IA.RAPID: NOT DETECTED
INTERNAL CONTROL: NORMAL
Lab: NORMAL
SARS-COV-2 AG UPPER RESP QL IA.RAPID: NOT DETECTED

## 2022-12-27 PROCEDURE — 99214 OFFICE O/P EST MOD 30 MIN: CPT | Performed by: PHYSICIAN ASSISTANT

## 2022-12-27 PROCEDURE — 87428 SARSCOV & INF VIR A&B AG IA: CPT | Performed by: PHYSICIAN ASSISTANT

## 2022-12-27 PROCEDURE — 71046 X-RAY EXAM CHEST 2 VIEWS: CPT

## 2022-12-27 RX ORDER — AMOXICILLIN AND CLAVULANATE POTASSIUM 875; 125 MG/1; MG/1
1 TABLET, FILM COATED ORAL 2 TIMES DAILY
Qty: 14 TABLET | Refills: 0 | Status: SHIPPED | OUTPATIENT
Start: 2022-12-27 | End: 2023-01-03

## 2022-12-27 RX ORDER — PREDNISONE 10 MG/1
TABLET ORAL
Qty: 18 TABLET | Refills: 0 | Status: SHIPPED | OUTPATIENT
Start: 2022-12-27 | End: 2023-01-05

## 2022-12-27 NOTE — PROGRESS NOTES
Subjective   Chief Complaint   Patient presents with   • Cough   • Fatigue     4 weeks ago   • Shortness of Breath       History of Present Illness     Has had a cough for almost a month. Was negative for COVID and flu on 12/6, negative today as well. Has also had a sore throat. Some shortness of breath with coughing. Has chest soreness with coughing. Has not heard herself wheezing. Her appetite is down, but she is drinking fluids throughout the day. No fever or chills. Has had a sore throat. Had an episode of flashing lights in her eyes with them closed a few nights ago. Has an ophthalmologist.      Patient Active Problem List   Diagnosis   • Moderate persistent asthma without complication   • Gastroesophageal reflux disease   • Hoarseness   • Essential hypertension   • Migraine   • Dupuytren's contracture   • Mixed simple and mucopurulent chronic bronchitis (HCC)   • Type 2 diabetes mellitus with hyperglycemia, without long-term current use of insulin (HCC)   • Chronic left-sided low back pain without sciatica   • Tachycardia   • Seasonal allergies       Allergies   Allergen Reactions   • Gluten Meal Other (See Comments)     Blisters        Current Outpatient Medications on File Prior to Visit   Medication Sig Dispense Refill   • alendronate (FOSAMAX) 70 MG tablet Take 70 mg by mouth Daily. Pt takes 1 pill once a week.     • amitriptyline (ELAVIL) 10 MG tablet TAKE 2 TABLETS EVERY NIGHT AT BEDTIME 180 tablet 1   • calcium citrate-vitamin d (CITRACAL) 200-250 MG-UNIT tablet tablet Take  by mouth.     • Cetirizine HCl 10 MG capsule Take  by mouth daily.     • citalopram (CeleXA) 20 MG tablet TAKE 1 TABLET EVERY DAY 90 tablet 2   • colestipol (COLESTID) 1 g tablet Pt taking 2 tabs po daily 180 tablet 3   • dapsone 25 MG tablet Take 25 mg by mouth Daily. May take 2 if  rash returns     • fluticasone (FLONASE) 50 MCG/ACT nasal spray USE 2 SPRAYS IN EACH NOSTRIL EVERY DAY 48 g 1   • gabapentin (NEURONTIN) 300 MG capsule  TAKE 1 CAPSULE TWICE DAILY 180 capsule 1   • HYDROcodone Bit-Homatrop MBr (HYCODAN) 5-1.5 MG/5ML solution Take 2.5 mL by mouth Every 6 (Six) Hours As Needed for Cough. 120 mL 0   • magnesium oxide (MAGOX) 400 (241.3 Mg) MG tablet tablet Take 400 mg by mouth Daily.     • metoprolol tartrate (LOPRESSOR) 25 MG tablet TAKE 1 TABLET AT NIGHT 90 tablet 1   • montelukast (SINGULAIR) 10 MG tablet TAKE 1 TABLET EVERY DAY 90 tablet 1   • Multiple Vitamins-Minerals (MULTI COMPLETE PO) Take  by mouth.     • Omega-3 Fatty Acids (FISH OIL) 1200 MG capsule capsule Take  by mouth.     • omeprazole (priLOSEC) 40 MG capsule TAKE 1 CAPSULE EVERY DAY 90 capsule 3   • polycarbophil (FIBERCON) 625 MG tablet Take 625 mg by mouth as needed.     • Probiotic capsule Take  by mouth.     • spironolactone (ALDACTONE) 100 MG tablet TAKE 1 TABLET EVERY DAY 90 tablet 1   • SUMAtriptan (IMITREX) 100 MG tablet TAKE 1 TABLET BY MOUTH 1 (ONE) TIME AS NEEDED FOR MIGRAINE FOR UP TO 1 DOSE. 9 tablet 3   • Symbicort 160-4.5 MCG/ACT inhaler INHALE 2 PUFFS EVERY 12 HOURS 3 each 1   • tiotropium (Spiriva HandiHaler) 18 MCG per inhalation capsule Place 1 capsule into inhaler and inhale Daily. 30 capsule 0   • valACYclovir (VALTREX) 500 MG tablet      • Ventolin  (90 Base) MCG/ACT inhaler INHALE 2 PUFFS EVERY 4 HOURS AS NEEDED FOR WHEEZING 54 g 1     No current facility-administered medications on file prior to visit.       Past Medical History:   Diagnosis Date   • Aggressive former smoker    • Allergic rhinitis    • Anxiety    • Asthma    • Benign essential hypertension    • Cough    • Diarrhea    • Encounter for screening for lung cancer    • GERD (gastroesophageal reflux disease)    • Internal hemorrhoids    • Laceration of skin of lower leg    • Lung nodule    • Microscopic colitis    • Migraine, unspecified, not intractable, without status migrainosus    • Need for influenza vaccination    • Need for pneumococcal vaccination    • Osteoporosis    •  Painful hip    • Pleural effusion 2020   • Reactive airway disease with wheezing with acute exacerbation    • Seasonal allergies    • Sleep disturbances    • Smoking history        Family History   Problem Relation Age of Onset   • Heart failure Mother    • Emphysema Mother    • Other Sister         Brain tumor   • Lung cancer Brother        Social History     Socioeconomic History   • Marital status: Single   Tobacco Use   • Smoking status: Former     Packs/day: 2.00     Years: 44.00     Pack years: 88.00     Types: Cigarettes     Start date:      Quit date:      Years since quittin.9   • Smokeless tobacco: Never   • Tobacco comments:     smoked 44 years, 2 packs a day    Substance and Sexual Activity   • Alcohol use: Yes     Alcohol/week: 2.0 standard drinks     Types: 2 Cans of beer per week     Comment: socially   • Drug use: No   • Sexual activity: Not Currently       Past Surgical History:   Procedure Laterality Date   • BRAVO PROCEDURE N/A 2016    Procedure: ESOPHAGOGASTRODUODENOSCOPY AND BRAVO ;  Surgeon: Laila Trevino MD;  Location: [facility] ENDOSCOPY;  Service:    • CATARACT EXTRACTION  2015   • COLONOSCOPY  2015    ih 3/15 Dr. Trevino   • SIGMOIDOSCOPY N/A 2016    Procedure: SIGMOIDOSCOPY FLEXIBLE to transverse colon with biopsy;  Surgeon: Laila Trevino MD;  Location: [facility] ENDOSCOPY;  Service:      The following portions of the patient's history were reviewed and updated as appropriate: problem list, allergies, current medications, past medical history, past family history, past social history and past surgical history.    ROS     See HPI    Immunization History   Administered Date(s) Administered   • COVID-19 (PFIZER) PURPLE CAP 2021, 2021, 10/03/2021   • Covid-19 (Pfizer) Gray Cap 2022   • Fluad Quad 65+ 2020, 2021   • Fluzone High Dose =>65 Years (Vaxcare ONLY) 10/23/2018   • Fluzone High-Dose 65+yrs 10/03/2022   • Influenza,  Unspecified 10/08/2019   • Pneumococcal Conjugate 13-Valent (PCV13) 04/23/2018   • Pneumococcal Polysaccharide (PPSV23) 10/01/2016   • Shingrix 03/10/2020, 06/06/2020   • Tdap 05/21/2020       Objective   Vitals:    12/27/22 1020 12/27/22 1051   BP:  120/70   Pulse: 95    Temp: 98.3 °F (36.8 °C)    SpO2: 95%      There is no height or weight on file to calculate BMI.  Physical Exam  Vitals reviewed.   Constitutional:       Appearance: Normal appearance.   HENT:      Head: Normocephalic and atraumatic.   Cardiovascular:      Rate and Rhythm: Normal rate and regular rhythm.   Pulmonary:      Effort: Pulmonary effort is normal.      Breath sounds: Wheezing (all yuri fields, worse on the left) present.   Neurological:      Mental Status: She is alert.           Assessment & Plan   Diagnoses and all orders for this visit:    1. Other fatigue (Primary)  -     POCT SARS-CoV-2 Antigen AIRAM + Flu    2. Vitreous floaters of both eyes    3. Visual disturbances  -     Ambulatory Referral to Ophthalmology    4. Moderate persistent asthmatic bronchitis with acute exacerbation  -     Cancel: XR Chest 2 View  -     XR Chest 2 View    Other orders  -     predniSONE (DELTASONE) 10 MG tablet; Take 3 tablets by mouth Daily for 3 days, THEN 2 tablets Daily for 3 days, THEN 1 tablet Daily for 3 days.  Dispense: 18 tablet; Refill: 0  -     amoxicillin-clavulanate (Augmentin) 875-125 MG per tablet; Take 1 tablet by mouth 2 (Two) Times a Day for 7 days.  Dispense: 14 tablet; Refill: 0    CXR today start Augmentin and Prednisone taper, start her nebulizer every 4-6 hours. Will follow up with me in the office in 3 days. Concerned for CAP. Sats are stable. Will get her in with her ophthalmologist this week due to new visual disturbances.     Return in about 3 days (around 12/30/2022).

## 2022-12-28 ENCOUNTER — TELEPHONE (OUTPATIENT)
Dept: INTERNAL MEDICINE | Facility: CLINIC | Age: 72
End: 2022-12-28

## 2022-12-28 NOTE — TELEPHONE ENCOUNTER
Caller: CYNDY    Relationship to patient: EMERGENCY CONTACT    Best call back number: [phone]   Patient is needing:     CYNDY SAYS NIKI HAD OPTHALMOLOGY APPOINTMENT 12/27/2022, WAS GIVEN A CLEAN BILL OF HEALTH. NO LONGER NEEDING APPOINTMENT

## 2022-12-30 ENCOUNTER — OFFICE VISIT (OUTPATIENT)
Dept: INTERNAL MEDICINE | Facility: CLINIC | Age: 72
End: 2022-12-30

## 2022-12-30 VITALS
BODY MASS INDEX: 26.74 KG/M2 | DIASTOLIC BLOOD PRESSURE: 70 MMHG | SYSTOLIC BLOOD PRESSURE: 130 MMHG | HEIGHT: 67 IN | WEIGHT: 170.4 LBS

## 2022-12-30 DIAGNOSIS — J45.40 MODERATE PERSISTENT ASTHMA WITHOUT COMPLICATION: Primary | ICD-10-CM

## 2022-12-30 DIAGNOSIS — J40 BRONCHITIS: ICD-10-CM

## 2022-12-30 PROCEDURE — 99213 OFFICE O/P EST LOW 20 MIN: CPT | Performed by: PHYSICIAN ASSISTANT

## 2022-12-30 RX ORDER — CILOSTAZOL 50 MG/1
50 TABLET ORAL 2 TIMES DAILY
COMMUNITY
Start: 2022-11-30

## 2022-12-30 NOTE — PROGRESS NOTES
Subjective   Chief Complaint   Patient presents with   • Cough       History of Present Illness     Pt is here today for 3 day fup. CXR showed no pneumonia. She was started on Augmentin and oral steroids as well as her nebulizer. She saw ophthalmology and had a normal exam.      Patient Active Problem List   Diagnosis   • Moderate persistent asthma without complication   • Gastroesophageal reflux disease   • Hoarseness   • Essential hypertension   • Migraine   • Dupuytren's contracture   • Mixed simple and mucopurulent chronic bronchitis (HCC)   • Type 2 diabetes mellitus with hyperglycemia, without long-term current use of insulin (HCC)   • Chronic left-sided low back pain without sciatica   • Tachycardia   • Seasonal allergies       Allergies   Allergen Reactions   • Gluten Meal Other (See Comments)     Blisters        Current Outpatient Medications on File Prior to Visit   Medication Sig Dispense Refill   • alendronate (FOSAMAX) 70 MG tablet Take 70 mg by mouth Daily. Pt takes 1 pill once a week.     • amitriptyline (ELAVIL) 10 MG tablet TAKE 2 TABLETS EVERY NIGHT AT BEDTIME 180 tablet 1   • amoxicillin-clavulanate (Augmentin) 875-125 MG per tablet Take 1 tablet by mouth 2 (Two) Times a Day for 7 days. 14 tablet 0   • calcium citrate-vitamin d (CITRACAL) 200-250 MG-UNIT tablet tablet Take  by mouth.     • Cetirizine HCl 10 MG capsule Take  by mouth daily.     • citalopram (CeleXA) 20 MG tablet TAKE 1 TABLET EVERY DAY 90 tablet 2   • colestipol (COLESTID) 1 g tablet Pt taking 2 tabs po daily 180 tablet 3   • dapsone 25 MG tablet Take 25 mg by mouth Daily. May take 2 if  rash returns     • fluticasone (FLONASE) 50 MCG/ACT nasal spray USE 2 SPRAYS IN EACH NOSTRIL EVERY DAY 48 g 1   • gabapentin (NEURONTIN) 300 MG capsule TAKE 1 CAPSULE TWICE DAILY 180 capsule 1   • metoprolol tartrate (LOPRESSOR) 25 MG tablet TAKE 1 TABLET AT NIGHT 90 tablet 1   • montelukast (SINGULAIR) 10 MG tablet TAKE 1 TABLET EVERY DAY 90  tablet 1   • Multiple Vitamins-Minerals (MULTI COMPLETE PO) Take  by mouth.     • Omega-3 Fatty Acids (FISH OIL) 1200 MG capsule capsule Take  by mouth.     • omeprazole (priLOSEC) 40 MG capsule TAKE 1 CAPSULE EVERY DAY 90 capsule 3   • polycarbophil (FIBERCON) 625 MG tablet Take 625 mg by mouth as needed.     • predniSONE (DELTASONE) 10 MG tablet Take 3 tablets by mouth Daily for 3 days, THEN 2 tablets Daily for 3 days, THEN 1 tablet Daily for 3 days. 18 tablet 0   • Probiotic capsule Take  by mouth.     • spironolactone (ALDACTONE) 100 MG tablet TAKE 1 TABLET EVERY DAY 90 tablet 1   • SUMAtriptan (IMITREX) 100 MG tablet TAKE 1 TABLET BY MOUTH 1 (ONE) TIME AS NEEDED FOR MIGRAINE FOR UP TO 1 DOSE. 9 tablet 3   • Symbicort 160-4.5 MCG/ACT inhaler INHALE 2 PUFFS EVERY 12 HOURS 3 each 1   • tiotropium (Spiriva HandiHaler) 18 MCG per inhalation capsule Place 1 capsule into inhaler and inhale Daily. 30 capsule 0   • valACYclovir (VALTREX) 500 MG tablet      • Ventolin  (90 Base) MCG/ACT inhaler INHALE 2 PUFFS EVERY 4 HOURS AS NEEDED FOR WHEEZING 54 g 1   • cilostazol (PLETAL) 50 MG tablet 50 mg Daily.     • HYDROcodone Bit-Homatrop MBr (HYCODAN) 5-1.5 MG/5ML solution Take 2.5 mL by mouth Every 6 (Six) Hours As Needed for Cough. 120 mL 0   • magnesium oxide (MAGOX) 400 (241.3 Mg) MG tablet tablet Take 400 mg by mouth Daily.       No current facility-administered medications on file prior to visit.       Past Medical History:   Diagnosis Date   • Aggressive former smoker    • Allergic rhinitis    • Anxiety    • Asthma    • Benign essential hypertension    • Cough    • Diarrhea    • Encounter for screening for lung cancer    • GERD (gastroesophageal reflux disease)    • Internal hemorrhoids    • Laceration of skin of lower leg    • Lung nodule    • Microscopic colitis    • Migraine, unspecified, not intractable, without status migrainosus    • Need for influenza vaccination    • Need for pneumococcal vaccination    •  Osteoporosis    • Painful hip    • Pleural effusion 2020   • Reactive airway disease with wheezing with acute exacerbation    • Seasonal allergies    • Sleep disturbances    • Smoking history        Family History   Problem Relation Age of Onset   • Heart failure Mother    • Emphysema Mother    • Other Sister         Brain tumor   • Lung cancer Brother        Social History     Socioeconomic History   • Marital status: Single   Tobacco Use   • Smoking status: Former     Packs/day: 2.00     Years: 44.00     Pack years: 88.00     Types: Cigarettes     Start date:      Quit date:      Years since quittin.0   • Smokeless tobacco: Never   • Tobacco comments:     smoked 44 years, 2 packs a day    Substance and Sexual Activity   • Alcohol use: Yes     Alcohol/week: 2.0 standard drinks     Types: 2 Cans of beer per week     Comment: socially   • Drug use: No   • Sexual activity: Not Currently       Past Surgical History:   Procedure Laterality Date   • BRAVO PROCEDURE N/A 2016    Procedure: ESOPHAGOGASTRODUODENOSCOPY AND BRAVO ;  Surgeon: Laila Trevino MD;  Location: [facility] ENDOSCOPY;  Service:    • CATARACT EXTRACTION  2015   • COLONOSCOPY  2015    ih 3/15 Dr. Trevino   • SIGMOIDOSCOPY N/A 2016    Procedure: SIGMOIDOSCOPY FLEXIBLE to transverse colon with biopsy;  Surgeon: Laila Trevino MD;  Location: [facility] ENDOSCOPY;  Service:        The following portions of the patient's history were reviewed and updated as appropriate: problem list, allergies, current medications, past medical history, past family history, past social history and past surgical history.    ROS     See HPI    Immunization History   Administered Date(s) Administered   • COVID-19 (PFIZER) BIVALENT BOOSTER 12+YRS 10/27/2022   • COVID-19 (PFIZER) PURPLE CAP 2021, 2021, 10/03/2021   • Covid-19 (Pfizer) Gray Cap 2022   • Fluad Quad 65+ 2020, 2021   • Fluzone High Dose =>65 Years  "(Vaxcare ONLY) 10/23/2018   • Fluzone High-Dose 65+yrs 10/03/2022   • Influenza, Unspecified 10/08/2019   • Pneumococcal Conjugate 13-Valent (PCV13) 04/23/2018   • Pneumococcal Polysaccharide (PPSV23) 10/01/2016   • Shingrix 03/10/2020, 06/06/2020   • Tdap 05/21/2020       Objective   Vitals:    12/30/22 1059 12/30/22 1106   BP:  130/70   Weight: 77.3 kg (170 lb 6.4 oz)    Height: 170.2 cm (67\")      Body mass index is 26.69 kg/m².  Physical Exam  Vitals reviewed.   Constitutional:       Appearance: Normal appearance.   HENT:      Head: Normocephalic and atraumatic.   Cardiovascular:      Rate and Rhythm: Normal rate and regular rhythm.      Heart sounds: Normal heart sounds.   Pulmonary:      Effort: Pulmonary effort is normal.      Breath sounds: Normal breath sounds. No wheezing, rhonchi or rales.   Neurological:      Mental Status: She is alert.         Assessment & Plan   Diagnoses and all orders for this visit:    1. Moderate persistent asthma without complication (Primary)    2. Bronchitis     Marked improvement, continue course to completion.     No follow-ups on file.           "

## 2023-01-23 RX ORDER — SPIRONOLACTONE 100 MG/1
TABLET, FILM COATED ORAL
Qty: 90 TABLET | Refills: 1 | Status: SHIPPED | OUTPATIENT
Start: 2023-01-23

## 2023-02-11 DIAGNOSIS — I10 ESSENTIAL HYPERTENSION: ICD-10-CM

## 2023-02-13 RX ORDER — AMITRIPTYLINE HYDROCHLORIDE 10 MG/1
TABLET, FILM COATED ORAL
Qty: 180 TABLET | Refills: 1 | Status: SHIPPED | OUTPATIENT
Start: 2023-02-13

## 2023-02-21 RX ORDER — BUDESONIDE AND FORMOTEROL FUMARATE DIHYDRATE 160; 4.5 UG/1; UG/1
AEROSOL RESPIRATORY (INHALATION)
Qty: 3 EACH | Refills: 1 | Status: SHIPPED | OUTPATIENT
Start: 2023-02-21

## 2023-02-23 ENCOUNTER — TRANSCRIBE ORDERS (OUTPATIENT)
Dept: ADMINISTRATIVE | Facility: HOSPITAL | Age: 73
End: 2023-02-23
Payer: MEDICARE

## 2023-02-23 DIAGNOSIS — R91.8 PULMONARY INFILTRATE: Primary | ICD-10-CM

## 2023-02-28 ENCOUNTER — OFFICE VISIT (OUTPATIENT)
Dept: INTERNAL MEDICINE | Facility: CLINIC | Age: 73
End: 2023-02-28
Payer: MEDICARE

## 2023-02-28 VITALS
DIASTOLIC BLOOD PRESSURE: 74 MMHG | WEIGHT: 170 LBS | HEIGHT: 67 IN | SYSTOLIC BLOOD PRESSURE: 130 MMHG | BODY MASS INDEX: 26.68 KG/M2 | HEART RATE: 76 BPM

## 2023-02-28 DIAGNOSIS — M25.552 LEFT HIP PAIN: ICD-10-CM

## 2023-02-28 DIAGNOSIS — N30.00 ACUTE CYSTITIS WITHOUT HEMATURIA: Primary | ICD-10-CM

## 2023-02-28 DIAGNOSIS — I73.9 PAD (PERIPHERAL ARTERY DISEASE): ICD-10-CM

## 2023-02-28 LAB
BILIRUB BLD-MCNC: NEGATIVE MG/DL
CLARITY, POC: ABNORMAL
COLOR UR: YELLOW
EXPIRATION DATE: ABNORMAL
GLUCOSE UR STRIP-MCNC: NEGATIVE MG/DL
KETONES UR QL: NEGATIVE
LEUKOCYTE EST, POC: ABNORMAL
Lab: ABNORMAL
NITRITE UR-MCNC: POSITIVE MG/ML
PH UR: 6 [PH] (ref 5–8)
PROT UR STRIP-MCNC: NEGATIVE MG/DL
RBC # UR STRIP: NEGATIVE /UL
SP GR UR: 1.02 (ref 1–1.03)
UROBILINOGEN UR QL: NORMAL

## 2023-02-28 PROCEDURE — 81003 URINALYSIS AUTO W/O SCOPE: CPT | Performed by: INTERNAL MEDICINE

## 2023-02-28 PROCEDURE — 99214 OFFICE O/P EST MOD 30 MIN: CPT | Performed by: INTERNAL MEDICINE

## 2023-02-28 RX ORDER — ROSUVASTATIN CALCIUM 10 MG/1
10 TABLET, COATED ORAL DAILY
Qty: 90 TABLET | Refills: 1 | Status: SHIPPED | OUTPATIENT
Start: 2023-02-28

## 2023-02-28 RX ORDER — CEPHALEXIN 500 MG/1
500 CAPSULE ORAL 2 TIMES DAILY
Qty: 14 CAPSULE | Refills: 0 | Status: SHIPPED | OUTPATIENT
Start: 2023-02-28 | End: 2023-03-31

## 2023-02-28 NOTE — PROGRESS NOTES
"Chief Complaint  Urinary Tract Infection    Subjective        Che MCKEE Layfifi presents to [facility]  History of Present Illness just started feeling bad for about a week, getting gradually worse.  She started with just some burning with urination but now feels worse. Urine is cloudy and malodorous. No recent illnesses. She is not sexually active. No fever/chills.  She is eating and drinking normally.     Saw Dr. Webber about wounds, PVD- 65% L and 80% R. Added Pletal.   Having more and more problem with her L hip- gives out vandana if turning to L - pain with walking and raising leg/putting on pants.   Dr. Edwards working up her recurrent pulmonary exacerbations.     Objective   Vital Signs:  /74   Pulse 76   Ht 170.2 cm (67\")   Wt 77.1 kg (170 lb)   BMI 26.63 kg/m²   Estimated body mass index is 26.63 kg/m² as calculated from the following:    Height as of this encounter: 170.2 cm (67\").    Weight as of this encounter: 77.1 kg (170 lb).             Physical Exam  Constitutional:       Appearance: Normal appearance.   Cardiovascular:      Rate and Rhythm: Normal rate.   Musculoskeletal:         General: Tenderness: L groin.      Comments: Wearing compression stockings   Skin:     General: Skin is warm and dry.        Result Review :                   Assessment and Plan   Diagnoses and all orders for this visit:    1. Acute cystitis without hematuria (Primary)  Comments:  check culture and treat empirically- further eval if recurs.   Orders:  -     POCT urinalysis dipstick, automated  -     cephalexin (Keflex) 500 MG capsule; Take 1 capsule by mouth 2 (Two) Times a Day.  Dispense: 14 capsule; Refill: 0    2. PAD (peripheral artery disease) ([facility])  Comments:  per her report from Dr. Webber- needs to lower cholesterol, walk as tolerated.  Start rosuvastatin- recheck at f/u     3. Left hip pain  Comments:  referral to ortho- concerned about this being hip ortho.  Orders:  -     " Ambulatory Referral to Orthopedic Surgery    Other orders  -     rosuvastatin (Crestor) 10 MG tablet; Take 1 tablet by mouth Daily.  Dispense: 90 tablet; Refill: 1             Follow Up   No follow-ups on file.  Patient was given instructions and counseling regarding her condition or for health maintenance advice. Please see specific information pulled into the AVS if appropriate.

## 2023-03-13 ENCOUNTER — HOSPITAL ENCOUNTER (OUTPATIENT)
Dept: CT IMAGING | Facility: HOSPITAL | Age: 73
Discharge: HOME | End: 2023-03-13
Admitting: INTERNAL MEDICINE
Payer: MEDICARE

## 2023-03-13 DIAGNOSIS — R91.8 PULMONARY INFILTRATE: ICD-10-CM

## 2023-03-13 PROCEDURE — 71250 CT THORAX DX C-: CPT

## 2023-03-20 RX ORDER — MONTELUKAST SODIUM 10 MG/1
TABLET ORAL
Qty: 90 TABLET | Refills: 1 | Status: SHIPPED | OUTPATIENT
Start: 2023-03-20

## 2023-03-27 DIAGNOSIS — N30.00 ACUTE CYSTITIS WITHOUT HEMATURIA: Primary | ICD-10-CM

## 2023-03-30 ENCOUNTER — OFFICE VISIT (OUTPATIENT)
Dept: ORTHOPEDIC SURGERY | Facility: CLINIC | Age: 73
End: 2023-03-30
Payer: MEDICARE

## 2023-03-30 VITALS — HEIGHT: 67 IN | WEIGHT: 174 LBS | TEMPERATURE: 97.7 F | BODY MASS INDEX: 27.31 KG/M2

## 2023-03-30 DIAGNOSIS — M16.12 PRIMARY OSTEOARTHRITIS OF LEFT HIP: Primary | ICD-10-CM

## 2023-03-30 DIAGNOSIS — R52 PAIN: ICD-10-CM

## 2023-03-30 PROCEDURE — 99203 OFFICE O/P NEW LOW 30 MIN: CPT | Performed by: ORTHOPAEDIC SURGERY

## 2023-03-30 NOTE — PROGRESS NOTES
General Exam    Patient: Che Landaverde    YOB: 1950    Medical Record Number: [id number]    Chief Complaints: Left hip pain    History of Present Illness:     72 y.o. female patient who presents for evaluation treatment of left hip pain.  Patient states she has had some hip and knee pain previously about 5 to 6 years ago she was treated with gabapentin diclofenac she said that went better.  However just recently symptoms have recurred she says the symptoms are more severe some more deep type going pain can radiate down to the knee.  She said she started some diclofenac recently that was given to her by her PCP and doses of that did help.    Denies any numbness or tingling.  Denies any fevers, cough or shortness of breath.    Allergies:   Allergies   Allergen Reactions   • Gluten Meal Other (See Comments)     Blisters        Home Medications:      Current Outpatient Medications:   •  amitriptyline (ELAVIL) 10 MG tablet, TAKE 2 TABLETS EVERY NIGHT AT BEDTIME, Disp: 180 tablet, Rfl: 1  •  calcium citrate-vitamin d (CITRACAL) 200-250 MG-UNIT tablet tablet, Take  by mouth., Disp: , Rfl:   •  Cetirizine HCl 10 MG capsule, Take  by mouth daily., Disp: , Rfl:   •  cilostazol (PLETAL) 50 MG tablet, Take 1 tablet by mouth 2 (Two) Times a Day., Disp: , Rfl:   •  citalopram (CeleXA) 20 MG tablet, TAKE 1 TABLET EVERY DAY, Disp: 90 tablet, Rfl: 2  •  colestipol (COLESTID) 1 g tablet, Pt taking 2 tabs po daily, Disp: 180 tablet, Rfl: 3  •  dapsone 25 MG tablet, Take 1 tablet by mouth Daily. May take 2 if  rash returns, Disp: , Rfl:   •  fluticasone (FLONASE) 50 MCG/ACT nasal spray, USE 2 SPRAYS IN EACH NOSTRIL EVERY DAY, Disp: 48 g, Rfl: 1  •  gabapentin (NEURONTIN) 300 MG capsule, TAKE 1 CAPSULE TWICE DAILY, Disp: 180 capsule, Rfl: 1  •  metoprolol tartrate (LOPRESSOR) 25 MG tablet, TAKE 1 TABLET AT NIGHT, Disp: 90 tablet, Rfl: 1  •  montelukast (SINGULAIR) 10 MG tablet, TAKE 1 TABLET EVERY DAY, Disp: 90 tablet,  Rfl: 1  •  Multiple Vitamins-Minerals (MULTI COMPLETE PO), Take  by mouth., Disp: , Rfl:   •  Omega-3 Fatty Acids (FISH OIL) 1200 MG capsule capsule, Take  by mouth., Disp: , Rfl:   •  omeprazole (priLOSEC) 40 MG capsule, TAKE 1 CAPSULE EVERY DAY, Disp: 90 capsule, Rfl: 3  •  polycarbophil (FIBERCON) 625 MG tablet, Take 1 tablet by mouth As Needed., Disp: , Rfl:   •  Probiotic capsule, Take  by mouth., Disp: , Rfl:   •  rosuvastatin (Crestor) 10 MG tablet, Take 1 tablet by mouth Daily., Disp: 90 tablet, Rfl: 1  •  spironolactone (ALDACTONE) 100 MG tablet, TAKE 1 TABLET EVERY DAY, Disp: 90 tablet, Rfl: 1  •  SUMAtriptan (IMITREX) 100 MG tablet, TAKE 1 TABLET BY MOUTH 1 (ONE) TIME AS NEEDED FOR MIGRAINE FOR UP TO 1 DOSE., Disp: 9 tablet, Rfl: 3  •  Symbicort 160-4.5 MCG/ACT inhaler, INHALE 2 PUFFS EVERY 12 HOURS, Disp: 3 each, Rfl: 1  •  tiotropium (Spiriva HandiHaler) 18 MCG per inhalation capsule, Place 1 capsule into inhaler and inhale Daily., Disp: 30 capsule, Rfl: 0  •  valACYclovir (VALTREX) 500 MG tablet, , Disp: , Rfl:   •  Ventolin  (90 Base) MCG/ACT inhaler, INHALE 2 PUFFS EVERY 4 HOURS AS NEEDED FOR WHEEZING, Disp: 54 g, Rfl: 1  •  alendronate (FOSAMAX) 70 MG tablet, Take 1 tablet by mouth Daily. Pt takes 1 pill once a week., Disp: , Rfl:   •  cephalexin (Keflex) 500 MG capsule, Take 1 capsule by mouth 2 (Two) Times a Day., Disp: 14 capsule, Rfl: 0  •  HYDROcodone Bit-Homatrop MBr (HYCODAN) 5-1.5 MG/5ML solution, Take 2.5 mL by mouth Every 6 (Six) Hours As Needed for Cough., Disp: 120 mL, Rfl: 0  •  magnesium oxide (MAGOX) 400 (241.3 Mg) MG tablet tablet, Take 1 tablet by mouth Daily., Disp: , Rfl:     Past Medical History:   Diagnosis Date   • Aggressive former smoker    • Allergic rhinitis    • Anxiety    • Asthma    • Benign essential hypertension    • Cough    • Diarrhea    • Encounter for screening for lung cancer    • GERD (gastroesophageal reflux disease)    • Hip arthrosis    • Internal  hemorrhoids    • Laceration of skin of lower leg    • Lung nodule    • Microscopic colitis    • Migraine, unspecified, not intractable, without status migrainosus    • Need for influenza vaccination    • Need for pneumococcal vaccination    • Osteoporosis    • Painful hip    • Pleural effusion 2020   • Reactive airway disease with wheezing with acute exacerbation    • Seasonal allergies    • Sleep disturbances    • Smoking history        Past Surgical History:   Procedure Laterality Date   • BRAVO PROCEDURE N/A 2016    Procedure: ESOPHAGOGASTRODUODENOSCOPY AND BRAVO ;  Surgeon: Laila Trevino MD;  Location: [facility] ENDOSCOPY;  Service:    • CATARACT EXTRACTION  2015   • COLONOSCOPY  2015    ih 3/15 Dr. Trevino   • SIGMOIDOSCOPY N/A 2016    Procedure: SIGMOIDOSCOPY FLEXIBLE to transverse colon with biopsy;  Surgeon: Laila Trevino MD;  Location: [facility] ENDOSCOPY;  Service:        Social History     Occupational History   • Not on file   Tobacco Use   • Smoking status: Former     Packs/day: 2.00     Years: 44.00     Pack years: 88.00     Types: Cigarettes     Start date: 1967     Quit date: 2010     Years since quittin.2   • Smokeless tobacco: Never   • Tobacco comments:     smoked 44 years, 2 packs a day    Vaping Use   • Vaping Use: Never used   Substance and Sexual Activity   • Alcohol use: Yes     Alcohol/week: 2.0 standard drinks     Types: 2 Cans of beer per week     Comment: socially   • Drug use: No   • Sexual activity: Not Currently      Social History     Social History Narrative   • Not on file       Family History   Problem Relation Age of Onset   • Heart failure Mother    • Emphysema Mother    • Other Sister         Brain tumor   • Lung cancer Brother        Review of Systems:      Constitutional: Denies fever, shaking or chills         All other pertinent positives and negatives as noted above in HPI.    Physical Exam: 72 y.o. female    Vitals:    23  "1011   Temp: 97.7 °F (36.5 °C)   TempSrc: Temporal   Weight: 78.9 kg (174 lb)   Height: 170.2 cm (67\")       General:  Patient is awake and alert.  Appears in no acute distress or discomfort.      Musculoskeletal/Extremities:    Left lower extremity examined no tenderness laterally.  Hip range of motion appears to be full and painless.  Positive Stinchfield.  Negative straight leg raise.  Motor and sensory intact distally.         Radiology:       AP pelvis and AP and lateral left hip taken reviewed to evaluate the patient's complaint/s.    Imaging shows some mild to moderate degenerative changes early bone sclerosis and osteophyte formation.     No imaging for comparison.    Assessment: Left hip osteoarthritis      Plan:      I discussed the findings with the patient that she was doing arthritic in nature she responded well to diclofenac and I told her to continue that over the next week or so and then for symptom management as needed.  Told her to modify her activity and slowly build back into things if she needs to we can always send her some formal therapy.  All things are progressing and symptoms are resolving she may follow-up as needed if things or not had the right direction she may return for further evaluation as needed.           We will plan for follow up as needed.    All questions were answered.  Patient understands and agrees with the plan.    Chiki England MD    03/30/2023    CC to Myrna Tafoya MD        "

## 2023-03-31 DIAGNOSIS — A49.9 ESBL (EXTENDED SPECTRUM BETA-LACTAMASE) PRODUCING BACTERIA INFECTION: Primary | ICD-10-CM

## 2023-03-31 DIAGNOSIS — Z16.12 ESBL (EXTENDED SPECTRUM BETA-LACTAMASE) PRODUCING BACTERIA INFECTION: Primary | ICD-10-CM

## 2023-03-31 LAB
BACTERIA UR CULT: ABNORMAL
BACTERIA UR CULT: ABNORMAL
OTHER ANTIBIOTIC SUSC ISLT: ABNORMAL

## 2023-03-31 RX ORDER — NITROFURANTOIN 25; 75 MG/1; MG/1
100 CAPSULE ORAL 2 TIMES DAILY
Qty: 14 CAPSULE | Refills: 0 | Status: SHIPPED | OUTPATIENT
Start: 2023-03-31 | End: 2023-03-31

## 2023-03-31 RX ORDER — SULFAMETHOXAZOLE AND TRIMETHOPRIM 800; 160 MG/1; MG/1
1 TABLET ORAL 2 TIMES DAILY
Qty: 14 TABLET | Refills: 0 | Status: SHIPPED | OUTPATIENT
Start: 2023-03-31

## 2023-04-26 RX ORDER — CITALOPRAM 20 MG/1
TABLET ORAL
Qty: 90 TABLET | Refills: 2 | Status: SHIPPED | OUTPATIENT
Start: 2023-04-26

## 2023-05-04 ENCOUNTER — OFFICE VISIT (OUTPATIENT)
Dept: INTERNAL MEDICINE | Facility: CLINIC | Age: 73
End: 2023-05-04
Payer: MEDICARE

## 2023-05-04 VITALS
HEART RATE: 88 BPM | HEIGHT: 67 IN | SYSTOLIC BLOOD PRESSURE: 134 MMHG | BODY MASS INDEX: 27.31 KG/M2 | WEIGHT: 174 LBS | DIASTOLIC BLOOD PRESSURE: 74 MMHG

## 2023-05-04 DIAGNOSIS — A49.9 ESBL (EXTENDED SPECTRUM BETA-LACTAMASE) PRODUCING BACTERIA INFECTION: ICD-10-CM

## 2023-05-04 DIAGNOSIS — E11.65 TYPE 2 DIABETES MELLITUS WITH HYPERGLYCEMIA, WITHOUT LONG-TERM CURRENT USE OF INSULIN: Primary | ICD-10-CM

## 2023-05-04 DIAGNOSIS — S51.012A SKIN TEAR OF LEFT ELBOW WITHOUT COMPLICATION, INITIAL ENCOUNTER: ICD-10-CM

## 2023-05-04 DIAGNOSIS — Z16.12 ESBL (EXTENDED SPECTRUM BETA-LACTAMASE) PRODUCING BACTERIA INFECTION: ICD-10-CM

## 2023-05-04 NOTE — PROGRESS NOTES
"Chief Complaint  Hypertension    Subjective        Che MCKEE Layman presents to [facility]  History of Present Illness here for f/u of her BS- no recent oral steroids.  COPD slightly worse, more \"choking\" on sputum.  Breathing ok between episodes. She admits that during the winter she has not been eating well.  Not cooking much, quicker food with higher carbs.   Had a fall going down some steps outside- thinks she just wasn't paying good attention. Achy but getting better.  Skin tear on elbow she's treating with NS and wraps, also healing.   Having new problem with fecal leakage, vandana when she is having the bouts of diarrhea.  Takes colestipol daily- just one. Uses Imodium prn then has no BM for several days. Does not take softeners, laxatives, etc. Tries to control with diet alone.      Objective   Vital Signs:  /74   Pulse 88   Ht 170.2 cm (67\")   Wt 78.9 kg (174 lb)   BMI 27.25 kg/m²   Estimated body mass index is 27.25 kg/m² as calculated from the following:    Height as of this encounter: 170.2 cm (67\").    Weight as of this encounter: 78.9 kg (174 lb).             Physical Exam  Constitutional:       Appearance: Normal appearance.   Cardiovascular:      Rate and Rhythm: Normal rate.   Pulmonary:      Effort: Pulmonary effort is normal.      Comments: Clears better after cough  Musculoskeletal:      Right lower leg: No edema.      Left lower leg: No edema.        Result Review :  The following data was reviewed by: Myrna Tafoya MD on 05/04/2023:  Common labs        9/26/2022    10:03 4/27/2023    10:57   Common Labs   Glucose 123   121     BUN 11   6     Creatinine 0.72   0.88     Sodium 137   138     Potassium 5.0   4.4     Chloride 98   99     Calcium 9.9   10.1     Total Protein 7.1   7.2     Albumin 4.70   4.6     Total Bilirubin 0.4   0.4     Alkaline Phosphatase 60   61     AST (SGOT) 19   21     ALT (SGPT) 17   24     WBC 9.56      Hemoglobin 12.6      Hematocrit " 39.0      Platelets 410      Total Cholesterol 212      Triglycerides 176      HDL Cholesterol 55      LDL Cholesterol  126      Hemoglobin A1C 6.30   7.30                    Assessment and Plan   Diagnoses and all orders for this visit:    1. Type 2 diabetes mellitus with hyperglycemia, without long-term current use of insulin (Primary)  Comments:  A1C up to 7.3%- admits she hasn't been eating well or moving around as much- she wants to do this and recheck with f/u    2. Skin tear of left elbow without complication, initial encounter  Comments:  covered today- to keep doing what she learned at wound care, call if doesn't continue to heal.    3. ESBL (extended spectrum beta-lactamase) producing bacteria infection  Comments:  referral placed to Dr. Frazier             Follow Up   Return in about 6 months (around 11/4/2023) for Medicare Wellness, Lab Before FUP.  Patient was given instructions and counseling regarding her condition or for health maintenance advice. Please see specific information pulled into the AVS if appropriate.

## 2023-05-30 RX ORDER — ROSUVASTATIN CALCIUM 10 MG/1
TABLET, COATED ORAL
Qty: 90 TABLET | Refills: 1 | Status: SHIPPED | OUTPATIENT
Start: 2023-05-30

## 2023-06-11 DIAGNOSIS — K21.9 GASTROESOPHAGEAL REFLUX DISEASE, UNSPECIFIED WHETHER ESOPHAGITIS PRESENT: ICD-10-CM

## 2023-06-12 RX ORDER — MONTELUKAST SODIUM 4 MG/1
TABLET, CHEWABLE ORAL
Qty: 180 TABLET | Refills: 3 | Status: SHIPPED | OUTPATIENT
Start: 2023-06-12

## 2023-08-28 ENCOUNTER — OFFICE VISIT (OUTPATIENT)
Dept: INTERNAL MEDICINE | Facility: CLINIC | Age: 73
End: 2023-08-28
Payer: MEDICARE

## 2023-08-28 VITALS
WEIGHT: 167 LBS | SYSTOLIC BLOOD PRESSURE: 134 MMHG | BODY MASS INDEX: 26.21 KG/M2 | HEIGHT: 67 IN | HEART RATE: 82 BPM | DIASTOLIC BLOOD PRESSURE: 76 MMHG

## 2023-08-28 DIAGNOSIS — E11.65 TYPE 2 DIABETES MELLITUS WITH HYPERGLYCEMIA, WITHOUT LONG-TERM CURRENT USE OF INSULIN: Primary | ICD-10-CM

## 2023-08-28 DIAGNOSIS — M54.16 BILATERAL LUMBAR RADICULOPATHY: ICD-10-CM

## 2023-08-28 PROBLEM — J45.40 MODERATE PERSISTENT ASTHMA WITHOUT COMPLICATION: Status: RESOLVED | Noted: 2019-05-21 | Resolved: 2023-08-28

## 2023-08-28 PROBLEM — K90.0 CELIAC DISEASE: Status: ACTIVE | Noted: 2023-08-28

## 2023-08-28 PROCEDURE — 3078F DIAST BP <80 MM HG: CPT | Performed by: INTERNAL MEDICINE

## 2023-08-28 PROCEDURE — 99213 OFFICE O/P EST LOW 20 MIN: CPT | Performed by: INTERNAL MEDICINE

## 2023-08-28 PROCEDURE — 3075F SYST BP GE 130 - 139MM HG: CPT | Performed by: INTERNAL MEDICINE

## 2023-08-28 PROCEDURE — 3051F HG A1C>EQUAL 7.0%<8.0%: CPT | Performed by: INTERNAL MEDICINE

## 2023-08-28 NOTE — PROGRESS NOTES
"Chief Complaint  Muscle Pain (Lower legs and both Feet )    Subjective        Che Landaverde presents to [facility]  History of Present Illness saw Dr. Webber for eval of her PAD- noticed she was rubbing her legs because of the discomfort. He did not think related to PAD. She has pain/discomfort in her anterior legs most of the time, goes down to her feet on top. Certain shoes helps the pain in her feet but not in the legs.  She is unable to stand or walk for long without pain.  She does get some sharp pain in the groin/hip but Dr. England did not think related to OA.   Finds herself rubbing her thighs a lot to get some relief.   She is having trouble going up stairs- feels like legs don't want to go up. This has gotten worse since last fall, along with the pain.   She denies any back pan.   No incontinence issues.   Saw Dr. Damian- work up negative- gave her Myrbetiq which is very helpful.   Has been taking gabapentin BID for a while for this same pain-     Objective   Vital Signs:  /76   Pulse 82   Ht 170.2 cm (67\")   Wt 75.8 kg (167 lb)   BMI 26.16 kg/mý   Estimated body mass index is 26.16 kg/mý as calculated from the following:    Height as of this encounter: 170.2 cm (67\").    Weight as of this encounter: 75.8 kg (167 lb).             Physical Exam  Constitutional:       General: She is not in acute distress.     Appearance: Normal appearance.   Pulmonary:      Effort: Pulmonary effort is normal.   Musculoskeletal:      Right lower leg: No edema.      Left lower leg: No edema.      Comments: No point tenderness to back. She does get increased issues in L anterior thigh with SLR   Skin:     Findings: No lesion or rash.   Psychiatric:         Mood and Affect: Mood normal.         Thought Content: Thought content normal.      Result Review :                   Assessment and Plan   Diagnoses and all orders for this visit:    1. Type 2 diabetes mellitus with hyperglycemia, " without long-term current use of insulin (Primary)  -     Comprehensive Metabolic Panel; Future  -     Hemoglobin A1c; Future  -     Lipid Panel With / Chol / HDL Ratio; Future  -     Microalbumin / Creatinine Urine Ratio - Urine, Clean Catch; Future    2. Bilateral lumbar radiculopathy  -     MRI Lumbar Spine Without Contrast; Future             Follow Up   No follow-ups on file.  Patient was given instructions and counseling regarding her condition or for health maintenance advice. Please see specific information pulled into the AVS if appropriate.

## 2023-08-30 RX ORDER — SPIRONOLACTONE 100 MG/1
TABLET, FILM COATED ORAL
Qty: 90 TABLET | Refills: 1 | Status: SHIPPED | OUTPATIENT
Start: 2023-08-30

## 2023-09-06 ENCOUNTER — HOSPITAL ENCOUNTER (OUTPATIENT)
Dept: MRI IMAGING | Facility: HOSPITAL | Age: 73
Discharge: HOME | End: 2023-09-06
Admitting: INTERNAL MEDICINE
Payer: MEDICARE

## 2023-09-06 DIAGNOSIS — M54.16 BILATERAL LUMBAR RADICULOPATHY: ICD-10-CM

## 2023-09-06 PROCEDURE — 72148 MRI LUMBAR SPINE W/O DYE: CPT

## 2023-09-11 DIAGNOSIS — M54.16 BILATERAL LUMBAR RADICULOPATHY: Primary | ICD-10-CM

## 2023-09-11 DIAGNOSIS — G43.709 CHRONIC MIGRAINE WITHOUT AURA WITHOUT STATUS MIGRAINOSUS, NOT INTRACTABLE: ICD-10-CM

## 2023-09-11 RX ORDER — GABAPENTIN 300 MG/1
300 CAPSULE ORAL 2 TIMES DAILY
Qty: 180 CAPSULE | Refills: 1 | Status: SHIPPED | OUTPATIENT
Start: 2023-09-11

## 2023-09-17 DIAGNOSIS — I10 ESSENTIAL HYPERTENSION: ICD-10-CM

## 2023-09-18 RX ORDER — AMITRIPTYLINE HYDROCHLORIDE 10 MG/1
TABLET, FILM COATED ORAL
Qty: 180 TABLET | Refills: 1 | Status: SHIPPED | OUTPATIENT
Start: 2023-09-18

## 2023-09-21 ENCOUNTER — TREATMENT (OUTPATIENT)
Dept: PHYSICAL THERAPY | Facility: CLINIC | Age: 73
End: 2023-09-21
Payer: MEDICARE

## 2023-09-21 DIAGNOSIS — M54.16 RADICULOPATHY, LUMBAR REGION: Primary | ICD-10-CM

## 2023-09-21 DIAGNOSIS — R26.2 DIFFICULTY WALKING: ICD-10-CM

## 2023-09-21 NOTE — PROGRESS NOTES
"  Physical Therapy Initial Evaluation and Plan of Care    [facility]  [address] [place] [address]  [phone] (phone)  [phone] (fax)    Patient: Che Landaverde   : 1950  Diagnosis/ICD-10 Code:  Radiculopathy, lumbar region [M54.16]  Referring practitioner: Myrna Tafoya MD  Date of Initial Visit: 2023  Today's Date: 2023  Patient seen for 1 sessions           Subjective Evaluation    History of Present Illness  Mechanism of injury: Patient complains of B LE pain as well as difficulty walking and performing transitional movements due to lumbar radiculopathy. MRI showed L5/S1 degenerative changes as well as a slight bulge. Pain started in L groin but has now migrated down her L thigh and into the foot. Her R LE now has similar symptoms. Patient describes the pain as a tightness. She also gets cramping at night. When walking she feels like her legs \"catch\" and she suffered two falls within the last 10 weeks because of this.         PMH: Osteoporosis, PAD, COPD, diabetic    PLOF: Walks for leisure either outside or on treadmill. Enjoys shopping      Patient Occupation: Retired Quality of life: good    Pain  Current pain ratin  At best pain ratin  At worst pain rating: 10  Location: B LE  Quality: tight and cramping  Relieving factors: relaxation, rest, support and change in position  Aggravating factors: ambulation, stairs, standing and repetitive movement  Progression: worsening    Social Support  Lives in: one-story house  Lives with: alone    Diagnostic Tests  X-ray: abnormal  MRI studies: abnormal    Patient Goals  Patient goals for therapy: increased strength, decreased pain, return to sport/leisure activities, increased motion and improved balance           Objective          Neurological Testing     Sensation     Lumbar   Left   Intact: light touch    Right   Intact: light touch    Active Range of Motion     Lumbar   Flexion: 70 degrees "   Extension: 25 degrees with pain    Strength/Myotome Testing     Left Hip   Planes of Motion   Flexion: 4  External rotation: 3+    Right Hip   Planes of Motion   Flexion: 4+  External rotation: 4-    Left Knee   Flexion: 4+  Extension: 4+    Right Knee   Flexion: 4+  Extension: 4+    Left Ankle/Foot   Dorsiflexion: 4+    Right Ankle/Foot   Dorsiflexion: 4+    Tests       Thoracic   Positive slump.     Lumbar     Left   Positive crossed SLR.   Negative passive SLR.     Right   Positive crossed SLR.   Negative passive SLR.     Left Pelvic Girdle/Sacrum   Negative: thigh thrust.     Right Pelvic Girdle/Sacrum   Negative: thigh thrust.     Left Hip   Positive scour.   Negative RUSS.     Right Hip   Negative RUSS and scour.     Ambulation     Comments   Lack of trunk rotation and arm swing causing more stiff gait. In general step length is equal and patient ambulates with normal heel-toe pattern      General Comments     Lumbar Comments  LLD:    87 cm L ASIS to medial malleolus  89 cm R ASIS to medial malleolus     See Exercise, Manual, and Modality Logs for complete treatment.       Functional Outcome Score: Oswestry= 44% disability        Assessment & Plan       Assessment  Impairments: abnormal gait, abnormal or restricted ROM, activity intolerance, impaired physical strength, lacks appropriate home exercise program and pain with function   Functional limitations: lifting, sleeping, walking, uncomfortable because of pain, sitting and standing   Assessment details: Che Landaverde is a 73 y.o. year-old female referred to physical therapy for back pain/lumbar radiculopathy. She presents with a evolving clinical presentation.  She has comorbidities of COPD, PAD, and diabetes and personal factors of fear of falling and recent falls which may affect her progress in the plan of care.  Signs and symptoms are consistent with physical therapy diagnosis of lumbar radiculopathy and difficulty walking. Pt was educated on  course of treatment, possible reasons for pain, activity modifications, and use of ice/heat PRN. Pt was given a copy of HEP. Patient is appropriate for skilled physical therapy in order to reduce pain and increase ease with daily mobility.   Prognosis: good    Goals  Plan Goals: Short Term Goals for completion in 30 days:   -Patient will report good compliance with initial HEP  -Patient will demonstrate good core stabilization strength with PPTs to help reduce strain on low back  -Patient will 50% reduction in leg pain at night to improve quality of sleep    LTGs for completion within 90 days:  -Patient will report implementation of heel lift on L LE to help correct LLD and ensure a more level pelvis with standing/gait  -Patient will reduce perceived disability on Oswestry from 44% disability to <30% disability to improve quality of life  -Patient will increase B LE hip strength to 4/5 or greater to increase LE stability during gait    Plan  Therapy options: will be seen for skilled therapy services  Planned modality interventions: cryotherapy, dry needling, TENS, thermotherapy (hydrocollator packs), traction, electrical stimulation/Mauritian stimulation and ultrasound  Planned therapy interventions: postural training, stretching, therapeutic activities, gait training, home exercise program, balance/weight-bearing training, flexibility, strengthening, functional ROM exercises, neuromuscular re-education, abdominal trunk stabilization and manual therapy  Other planned therapy interventions: Aquatic therapy  Frequency: 2x week (36 visits)  Plan details: Therapy for core stabilization, LE strength/stability, gait training, balance, and posture      Timed:  Manual Therapy:         mins  66031;  Therapeutic Exercise:    15     mins  00031;     Neuromuscular Dudley:        mins  10495;    Therapeutic Activity:     10     mins  25732;     Gait Training:           mins  56722;     Ultrasound:          mins  20766;     Iontophoresis         mins 05357    Untimed:  Electrical Stimulation:         mins  69634 ( );  Traction:       mins  02080;   Dry Needling   (1-2 muscles)   _     mins 20560 (Self-pay)  Dry Needling (3-4 muscles)  _     mins 20561 (Self-pay)  Dry Needling Trial    _     mins DRYNDLTRIAL  (No Charge)  Low Eval          Mins  43966  Mod Eval     25     Mins  95297  High Eval                            Mins  70227  Re- Eval                           Mins  43265    Timed Treatment:   25   mins   Total Treatment:     50   mins    PT SIGNATURE: Michelle Maxwell PT     License Number: [id number]    Electronically signed by Michelle Maxwell PT, 09/21/23, 8:42 AM EDT    DATE TREATMENT INITIATED: 9/21/2023    Initial Certification  Certification Period: 12/20/2023  I certify that the therapy services are furnished while this patient is under my care.  The services outlined above are required by this patient, and will be reviewed every 90 days.     PHYSICIAN: Myrna Tafoya MD   NPI: [id number]                                         DATE:     Please sign and return via fax to [phone] Thank you, [facility].

## 2023-10-25 RX ORDER — MONTELUKAST SODIUM 10 MG/1
TABLET ORAL
Qty: 90 TABLET | Refills: 1 | Status: SHIPPED | OUTPATIENT
Start: 2023-10-25

## 2023-11-01 ENCOUNTER — DOCUMENTATION (OUTPATIENT)
Dept: PHYSICAL THERAPY | Facility: CLINIC | Age: 73
End: 2023-11-01
Payer: MEDICARE

## 2023-11-01 NOTE — PROGRESS NOTES
Discharge Summary  Discharge Summary from Physical Therapy Report      Dates  PT visit: 9/21/23  Number of Visits: 1       Goals: Not Met    Goals  Plan Goals: Short Term Goals for completion in 30 days:   -Patient will report good compliance with initial HEP  -Patient will demonstrate good core stabilization strength with PPTs to help reduce strain on low back  -Patient will 50% reduction in leg pain at night to improve quality of sleep     LTGs for completion within 90 days:  -Patient will report implementation of heel lift on L LE to help correct LLD and ensure a more level pelvis with standing/gait  -Patient will reduce perceived disability on Oswestry from 44% disability to <30% disability to improve quality of life  -Patient will increase B LE hip strength to 4/5 or greater to increase LE stability during gait       Discharge Plan: Future need for rehabilitation activities    Comments EVAL ONLY- insurance out of network    Date of Discharge : 11/1/23        Michelle Maxwell, PT  Physical Therapist

## 2023-11-08 ENCOUNTER — OFFICE VISIT (OUTPATIENT)
Dept: INTERNAL MEDICINE | Facility: CLINIC | Age: 73
End: 2023-11-08
Payer: MEDICARE

## 2023-11-08 VITALS
BODY MASS INDEX: 26.37 KG/M2 | HEIGHT: 67 IN | WEIGHT: 168 LBS | HEART RATE: 78 BPM | SYSTOLIC BLOOD PRESSURE: 134 MMHG | DIASTOLIC BLOOD PRESSURE: 70 MMHG

## 2023-11-08 DIAGNOSIS — L60.0 INGROWN TOENAIL OF BOTH FEET: ICD-10-CM

## 2023-11-08 DIAGNOSIS — I10 ESSENTIAL HYPERTENSION: Chronic | ICD-10-CM

## 2023-11-08 DIAGNOSIS — Z00.00 MEDICARE ANNUAL WELLNESS VISIT, SUBSEQUENT: ICD-10-CM

## 2023-11-08 DIAGNOSIS — J41.8 MIXED SIMPLE AND MUCOPURULENT CHRONIC BRONCHITIS: ICD-10-CM

## 2023-11-08 DIAGNOSIS — E11.65 TYPE 2 DIABETES MELLITUS WITH HYPERGLYCEMIA, WITHOUT LONG-TERM CURRENT USE OF INSULIN: Primary | Chronic | ICD-10-CM

## 2023-11-08 DIAGNOSIS — I73.9 PAD (PERIPHERAL ARTERY DISEASE): ICD-10-CM

## 2023-11-08 PROBLEM — R00.0 TACHYCARDIA: Status: RESOLVED | Noted: 2022-04-05 | Resolved: 2023-11-08

## 2023-11-08 NOTE — PROGRESS NOTES
The ABCs of the Annual Wellness Visit  Subsequent Medicare Wellness Visit    Subjective    Che Landaverde is a 73 y.o. female who presents for a Subsequent Medicare Wellness Visit.    The following portions of the patient's history were reviewed and   updated as appropriate: allergies, current medications, past family history, past medical history, past social history, past surgical history, and problem list.    Compared to one year ago, the patient feels her physical   health is the same.    Compared to one year ago, the patient feels her mental   health is the same.    Recent Hospitalizations:  She was not admitted to the hospital during the last year.       Current Medical Providers:  Patient Care Team:  Myrna Tafoya MD as PCP - General (Internal Medicine)  Iza Porter MD as Consulting Physician (Obstetrics and Gynecology)  Ramon Edwards MD as Consulting Physician (Pulmonary Disease)  Bryce Navarro MD as Consulting Physician (Otolaryngology)  Annamaria Sequeira MD as Consulting Physician (Dermatology)    Outpatient Medications Prior to Visit   Medication Sig Dispense Refill    amitriptyline (ELAVIL) 10 MG tablet TAKE 2 TABLETS EVERY NIGHT AT BEDTIME 180 tablet 1    calcium citrate-vitamin d (CITRACAL) 200-250 MG-UNIT tablet tablet Take  by mouth.      Cetirizine HCl 10 MG capsule Take  by mouth daily.      cilostazol (PLETAL) 50 MG tablet Take 1 tablet by mouth 2 (Two) Times a Day.      citalopram (CeleXA) 20 MG tablet TAKE 1 TABLET EVERY DAY 90 tablet 2    dapsone 25 MG tablet Take 1 tablet by mouth Daily. May take 2 if  rash returns      fluticasone (FLONASE) 50 MCG/ACT nasal spray USE 2 SPRAYS IN EACH NOSTRIL EVERY DAY 48 g 1    gabapentin (NEURONTIN) 300 MG capsule Take 1 capsule by mouth 2 (Two) Times a Day. 180 capsule 1    metoprolol tartrate (LOPRESSOR) 25 MG tablet TAKE 1 TABLET AT NIGHT 90 tablet 1    montelukast (SINGULAIR) 10 MG tablet TAKE 1 TABLET EVERY DAY 90 tablet 1    Multiple  Vitamins-Minerals (MULTI COMPLETE PO) Take  by mouth.      Omega-3 Fatty Acids (FISH OIL) 1200 MG capsule capsule Take  by mouth.      omeprazole (priLOSEC) 40 MG capsule TAKE 1 CAPSULE EVERY DAY 90 capsule 3    polycarbophil (FIBERCON) 625 MG tablet Take 1 tablet by mouth As Needed.      Probiotic capsule Take  by mouth.      rosuvastatin (CRESTOR) 10 MG tablet TAKE 1 TABLET EVERY DAY 90 tablet 1    spironolactone (ALDACTONE) 100 MG tablet TAKE 1 TABLET EVERY DAY 90 tablet 1    SUMAtriptan (IMITREX) 100 MG tablet TAKE 1 TABLET BY MOUTH 1 (ONE) TIME AS NEEDED FOR MIGRAINE FOR UP TO 1 DOSE. 9 tablet 3    Symbicort 160-4.5 MCG/ACT inhaler INHALE 2 PUFFS EVERY 12 HOURS 3 each 1    tiotropium (Spiriva HandiHaler) 18 MCG per inhalation capsule Place 1 capsule into inhaler and inhale Daily. 30 capsule 0    Ventolin  (90 Base) MCG/ACT inhaler INHALE 2 PUFFS EVERY 4 HOURS AS NEEDED FOR WHEEZING 54 g 1    colestipol (COLESTID) 1 g tablet Pt taking 2 tabs po daily 180 tablet 3    valACYclovir (VALTREX) 500 MG tablet  (Patient not taking: Reported on 11/8/2023)       No facility-administered medications prior to visit.       No opioid medication identified on active medication list. I have reviewed chart for other potential  high risk medication/s and harmful drug interactions in the elderly.        Aspirin is not on active medication list.  Aspirin use is not indicated based on review of current medical condition/s. Risk of harm outweighs potential benefits.  .    Patient Active Problem List   Diagnosis    Gastroesophageal reflux disease    Hoarseness    Essential hypertension    Migraine    Dupuytren's contracture    Mixed simple and mucopurulent chronic bronchitis    Type 2 diabetes mellitus with hyperglycemia, without long-term current use of insulin    Chronic left-sided low back pain without sciatica    Seasonal allergies    PAD (peripheral artery disease)    Celiac disease     Advance Care Planning   Advance Care  "Planning     Advance Directive is on file.  ACP discussion was held with the patient during this visit. Patient has an advance directive in EMR which is still valid.      Objective    Vitals:    23 0915   BP: 134/70   Pulse: 78   Weight: 76.2 kg (168 lb)   Height: 170.2 cm (67\")     Estimated body mass index is 26.31 kg/m² as calculated from the following:    Height as of this encounter: 170.2 cm (67\").    Weight as of this encounter: 76.2 kg (168 lb).           Does the patient have evidence of cognitive impairment? No    Lab Results   Component Value Date    CHLPL 159 2023    TRIG 78 2023    HDL 72 2023    LDL 72 2023    VLDL 15 2023    HGBA1C 6.7 (H) 2023        HEALTH RISK ASSESSMENT    Smoking Status:  Social History     Tobacco Use   Smoking Status Former    Packs/day: 2.00    Years: 44.00    Additional pack years: 0.00    Total pack years: 88.00    Types: Cigarettes    Start date: 1967    Quit date: 2010    Years since quittin.8   Smokeless Tobacco Never   Tobacco Comments    smoked 44 years, 2 packs a day      Alcohol Consumption:  Social History     Substance and Sexual Activity   Alcohol Use Yes    Alcohol/week: 2.0 standard drinks of alcohol    Types: 2 Cans of beer per week    Comment: socially     Fall Risk Screen:    AMANDAADI Fall Risk Assessment was completed, and patient is at LOW risk for falls.Assessment completed on:2023    Depression Screenin/8/2023     9:18 AM   PHQ-2/PHQ-9 Depression Screening   Little Interest or Pleasure in Doing Things 0-->not at all   Feeling Down, Depressed or Hopeless 0-->not at all   PHQ-9: Brief Depression Severity Measure Score 0       Health Habits and Functional and Cognitive Screenin/8/2023     9:18 AM   Functional & Cognitive Status   Do you have difficulty preparing food and eating? No   Do you have difficulty bathing yourself, getting dressed or grooming yourself? No   Do you have " difficulty using the toilet? No   Do you have difficulty moving around from place to place? No   Do you have trouble with steps or getting out of a bed or a chair? No   Current Diet Limited Junk Food   Dental Exam Up to date   Eye Exam Up to date   Exercise (times per week) 0 times per week   Current Exercises Include No Regular Exercise   Do you need help using the phone?  No   Are you deaf or do you have serious difficulty hearing?  No   Do you need help to go to places out of walking distance? No   Do you need help shopping? No   Do you need help preparing meals?  No   Do you need help with housework?  No   Do you need help with laundry? No   Do you need help taking your medications? No   Do you need help managing money? No   Do you ever drive or ride in a car without wearing a seat belt? No   Have you felt unusual stress, anger or loneliness in the last month? No   Who do you live with? Alone   If you need help, do you have trouble finding someone available to you? No   Have you been bothered in the last four weeks by sexual problems? No   Do you have difficulty concentrating, remembering or making decisions? No       Age-appropriate Screening Schedule:  Refer to the list below for future screening recommendations based on patient's age, sex and/or medical conditions. Orders for these recommended tests are listed in the plan section. The patient has been provided with a written plan.    Health Maintenance   Topic Date Due    HEPATITIS C SCREENING  Never done    ANNUAL WELLNESS VISIT  10/03/2023    DIABETIC FOOT EXAM  10/03/2023    DXA SCAN  11/08/2023    DIABETIC EYE EXAM  11/22/2023    LUNG CANCER SCREENING  03/13/2024    HEMOGLOBIN A1C  05/01/2024    BMI FOLLOWUP  09/11/2024    LIPID PANEL  11/01/2024    URINE MICROALBUMIN  11/01/2024    COLORECTAL CANCER SCREENING  06/07/2026    TDAP/TD VACCINES (2 - Td or Tdap) 05/21/2030    COVID-19 Vaccine  Completed    INFLUENZA VACCINE  Completed    Pneumococcal Vaccine  "65+  Completed    ZOSTER VACCINE  Completed                  CMS Preventative Services Quick Reference  Risk Factors Identified During Encounter  None Identified  The above risks/problems have been discussed with the patient.  Pertinent information has been shared with the patient in the After Visit Summary.  An After Visit Summary and PPPS were made available to the patient.    Follow Up:   Next Medicare Wellness visit to be scheduled in 1 year.       Additional E&M Note during same encounter follows:  Patient has multiple medical problems which are significant and separately identifiable that require additional work above and beyond the Medicare Wellness Visit.      Chief Complaint  Medicare Wellness-subsequent    Subjective        HPI  Che Landaverde is also being seen today for diabetes- she has really worked on her diet- more vegetables.  She feels ok, some sore throat this last couple of days.   Most frustration with her chronic bronchitis/asthma -cough  Just saw Dr. Porter- scheduled for dexa.   Would like to see podiatrist for ingrown toenails.        Objective   Vital Signs:  /70   Pulse 78   Ht 170.2 cm (67\")   Wt 76.2 kg (168 lb)   BMI 26.31 kg/m²     Physical Exam  Constitutional:       Appearance: Normal appearance. She is not ill-appearing.   Cardiovascular:      Rate and Rhythm: Normal rate and regular rhythm.      Pulses:           Dorsalis pedis pulses are 1+ on the right side and 1+ on the left side.        Posterior tibial pulses are 1+ on the right side and 1+ on the left side.   Pulmonary:      Effort: Pulmonary effort is normal.      Breath sounds: Normal breath sounds. No wheezing.   Musculoskeletal:      Right lower leg: No edema.      Left lower leg: No edema.   Feet:      Right foot:      Skin integrity: Skin integrity normal.      Left foot:      Skin integrity: Skin integrity normal.      Comments: Monofilament Test Performed  Diabetic Foot Exam Performed    Lymphadenopathy: "      Cervical: No cervical adenopathy.   Neurological:      General: No focal deficit present.          The following data was reviewed by: Myrna Tafoya MD on 11/08/2023:  Common labs          4/27/2023    10:57 11/1/2023    08:36   Common Labs   Glucose 121  137    BUN 6  8    Creatinine 0.88  0.95    Sodium 138  136    Potassium 4.4  5.0    Chloride 99  98    Calcium 10.1  9.7    Total Protein 7.2  7.4    Albumin 4.6  4.4    Total Bilirubin 0.4  0.6    Alkaline Phosphatase 61  81    AST (SGOT) 21  18    ALT (SGPT) 24  17    Total Cholesterol  159    Triglycerides  78    HDL Cholesterol  72    LDL Cholesterol   72    Hemoglobin A1C 7.30  6.7    Microalbumin, Urine  35.5      Data reviewed : Consultant notes Dr. Edwards           Assessment and Plan   Diagnoses and all orders for this visit:    1. Type 2 diabetes mellitus with hyperglycemia, without long-term current use of insulin (Primary)  Comments:  improved A1C with dietary changes- cont same.  Orders:  -     Comprehensive Metabolic Panel; Future  -     Hemoglobin A1c; Future    2. Essential hypertension  Comments:  Controlled, no change.    3. Ingrown toenail of both feet  Comments:  podiatry referral placed  Orders:  -     Ambulatory Referral to Podiatry    4. Mixed simple and mucopurulent chronic bronchitis  Comments:  mild exacerbation currently- likely allergic- cont with her current plan call me or Dr. Edwards if worsens.    5. PAD (peripheral artery disease)  Comments:  Remains asymptomatic- walking, Pletal.    6. Medicare annual wellness visit, subsequent  Comments:  RHM is up to date-- her labs are favorable. Has dexa this week. Stressed cont daily physical activity. Covid precautions.             Follow Up   Return in about 6 months (around 5/8/2024) for Recheck, Lab Before FUP.  Patient was given instructions and counseling regarding her condition or for health maintenance advice. Please see specific information pulled into the AVS if appropriate.

## 2023-11-11 DIAGNOSIS — G43.709 CHRONIC MIGRAINE WITHOUT AURA WITHOUT STATUS MIGRAINOSUS, NOT INTRACTABLE: ICD-10-CM

## 2023-11-12 RX ORDER — FLUTICASONE PROPIONATE 50 MCG
SPRAY, SUSPENSION (ML) NASAL
Qty: 48 G | Refills: 10 | Status: SHIPPED | OUTPATIENT
Start: 2023-11-12

## 2023-11-13 RX ORDER — SUMATRIPTAN 100 MG/1
TABLET, FILM COATED ORAL
Qty: 9 TABLET | Refills: 10 | Status: SHIPPED | OUTPATIENT
Start: 2023-11-13

## 2023-12-07 RX ORDER — OMEPRAZOLE 40 MG/1
CAPSULE, DELAYED RELEASE ORAL
Qty: 90 CAPSULE | Refills: 3 | Status: SHIPPED | OUTPATIENT
Start: 2023-12-07

## 2023-12-10 DIAGNOSIS — G43.709 CHRONIC MIGRAINE WITHOUT AURA WITHOUT STATUS MIGRAINOSUS, NOT INTRACTABLE: ICD-10-CM

## 2023-12-11 RX ORDER — GABAPENTIN 300 MG/1
300 CAPSULE ORAL 2 TIMES DAILY
Qty: 180 CAPSULE | Refills: 1 | Status: SHIPPED | OUTPATIENT
Start: 2023-12-11

## 2023-12-12 DIAGNOSIS — Z79.899 HIGH RISK MEDICATION USE: Primary | ICD-10-CM

## 2024-01-10 DIAGNOSIS — K21.9 GASTROESOPHAGEAL REFLUX DISEASE, UNSPECIFIED WHETHER ESOPHAGITIS PRESENT: ICD-10-CM

## 2024-01-10 DIAGNOSIS — I10 ESSENTIAL HYPERTENSION: ICD-10-CM

## 2024-01-10 RX ORDER — DAPSONE 25 MG/1
25 TABLET ORAL DAILY
Qty: 90 TABLET | Refills: 1 | Status: SHIPPED | OUTPATIENT
Start: 2024-01-10

## 2024-01-10 RX ORDER — MONTELUKAST SODIUM 10 MG/1
10 TABLET ORAL DAILY
Qty: 90 TABLET | Refills: 1 | Status: SHIPPED | OUTPATIENT
Start: 2024-01-10

## 2024-01-10 RX ORDER — SPIRONOLACTONE 100 MG/1
100 TABLET, FILM COATED ORAL DAILY
Qty: 90 TABLET | Refills: 1 | Status: SHIPPED | OUTPATIENT
Start: 2024-01-10

## 2024-01-10 RX ORDER — CILOSTAZOL 50 MG/1
50 TABLET ORAL 2 TIMES DAILY
Qty: 180 TABLET | Refills: 1 | Status: SHIPPED | OUTPATIENT
Start: 2024-01-10

## 2024-01-10 RX ORDER — FLUTICASONE PROPIONATE 50 MCG
2 SPRAY, SUSPENSION (ML) NASAL DAILY
Qty: 48 G | Refills: 10 | Status: CANCELLED | OUTPATIENT
Start: 2024-01-10

## 2024-01-10 RX ORDER — ROSUVASTATIN CALCIUM 10 MG/1
10 TABLET, COATED ORAL DAILY
Qty: 90 TABLET | Refills: 1 | Status: SHIPPED | OUTPATIENT
Start: 2024-01-10

## 2024-01-10 RX ORDER — AMITRIPTYLINE HYDROCHLORIDE 10 MG/1
20 TABLET, FILM COATED ORAL NIGHTLY
Qty: 180 TABLET | Refills: 1 | Status: SHIPPED | OUTPATIENT
Start: 2024-01-10

## 2024-01-10 RX ORDER — MONTELUKAST SODIUM 4 MG/1
TABLET, CHEWABLE ORAL
Qty: 180 TABLET | Refills: 1 | Status: SHIPPED | OUTPATIENT
Start: 2024-01-10

## 2024-01-10 RX ORDER — CITALOPRAM 20 MG/1
20 TABLET ORAL DAILY
Qty: 90 TABLET | Refills: 1 | Status: SHIPPED | OUTPATIENT
Start: 2024-01-10

## 2024-03-04 DIAGNOSIS — G43.709 CHRONIC MIGRAINE WITHOUT AURA WITHOUT STATUS MIGRAINOSUS, NOT INTRACTABLE: ICD-10-CM

## 2024-03-04 RX ORDER — GABAPENTIN 300 MG/1
300 CAPSULE ORAL 2 TIMES DAILY
Qty: 180 CAPSULE | Refills: 1 | Status: SHIPPED | OUTPATIENT
Start: 2024-03-04

## 2024-03-22 ENCOUNTER — TRANSCRIBE ORDERS (OUTPATIENT)
Dept: ADMINISTRATIVE | Facility: HOSPITAL | Age: 74
End: 2024-03-22
Payer: MEDICARE

## 2024-03-22 DIAGNOSIS — R91.8 GROUND GLASS OPACITY PRESENT ON IMAGING OF LUNG: Primary | ICD-10-CM

## 2024-03-22 DIAGNOSIS — R91.8 PULMONARY NODULES: ICD-10-CM

## 2024-04-02 ENCOUNTER — HOSPITAL ENCOUNTER (OUTPATIENT)
Dept: CT IMAGING | Facility: HOSPITAL | Age: 74
Discharge: HOME | End: 2024-04-02
Admitting: INTERNAL MEDICINE
Payer: MEDICARE

## 2024-04-02 DIAGNOSIS — R91.8 GROUND GLASS OPACITY PRESENT ON IMAGING OF LUNG: ICD-10-CM

## 2024-04-02 DIAGNOSIS — R91.8 PULMONARY NODULES: ICD-10-CM

## 2024-04-02 PROCEDURE — 71250 CT THORAX DX C-: CPT

## 2024-04-23 ENCOUNTER — TRANSCRIBE ORDERS (OUTPATIENT)
Dept: ADMINISTRATIVE | Facility: HOSPITAL | Age: 74
End: 2024-04-23
Payer: MEDICARE

## 2024-04-23 DIAGNOSIS — R91.1 LUNG NODULE: Primary | ICD-10-CM

## 2024-05-08 ENCOUNTER — TRANSCRIBE ORDERS (OUTPATIENT)
Dept: PULMONOLOGY | Facility: HOSPITAL | Age: 74
End: 2024-05-08
Payer: MEDICARE

## 2024-05-08 DIAGNOSIS — R91.1 LUNG NODULE: Primary | ICD-10-CM

## 2024-05-09 ENCOUNTER — OFFICE VISIT (OUTPATIENT)
Dept: INTERNAL MEDICINE | Facility: CLINIC | Age: 74
End: 2024-05-09
Payer: MEDICARE

## 2024-05-09 ENCOUNTER — HOSPITAL ENCOUNTER (OUTPATIENT)
Dept: CT IMAGING | Facility: HOSPITAL | Age: 74
Discharge: HOME | End: 2024-05-09
Admitting: INTERNAL MEDICINE
Payer: MEDICARE

## 2024-05-09 VITALS
HEIGHT: 67 IN | DIASTOLIC BLOOD PRESSURE: 70 MMHG | BODY MASS INDEX: 26.06 KG/M2 | HEART RATE: 80 BPM | SYSTOLIC BLOOD PRESSURE: 136 MMHG | WEIGHT: 166 LBS

## 2024-05-09 DIAGNOSIS — R26.89 BALANCE PROBLEM: Primary | ICD-10-CM

## 2024-05-09 DIAGNOSIS — E11.65 TYPE 2 DIABETES MELLITUS WITH HYPERGLYCEMIA, WITHOUT LONG-TERM CURRENT USE OF INSULIN: Chronic | ICD-10-CM

## 2024-05-09 DIAGNOSIS — R91.1 LUNG NODULE: ICD-10-CM

## 2024-05-09 DIAGNOSIS — M54.50 CHRONIC LEFT-SIDED LOW BACK PAIN WITHOUT SCIATICA: ICD-10-CM

## 2024-05-09 DIAGNOSIS — G89.29 CHRONIC LEFT-SIDED LOW BACK PAIN WITHOUT SCIATICA: ICD-10-CM

## 2024-05-09 LAB
TSH SERPL DL<=0.005 MIU/L-ACNC: 1.54 UIU/ML (ref 0.27–4.2)
WRITTEN AUTHORIZATION: NORMAL

## 2024-05-09 PROCEDURE — 99214 OFFICE O/P EST MOD 30 MIN: CPT | Performed by: INTERNAL MEDICINE

## 2024-05-09 PROCEDURE — 3044F HG A1C LEVEL LT 7.0%: CPT | Performed by: INTERNAL MEDICINE

## 2024-05-09 PROCEDURE — 1125F AMNT PAIN NOTED PAIN PRSNT: CPT | Performed by: INTERNAL MEDICINE

## 2024-05-09 PROCEDURE — 71250 CT THORAX DX C-: CPT

## 2024-05-09 PROCEDURE — 3075F SYST BP GE 130 - 139MM HG: CPT | Performed by: INTERNAL MEDICINE

## 2024-05-09 PROCEDURE — 1160F RVW MEDS BY RX/DR IN RCRD: CPT | Performed by: INTERNAL MEDICINE

## 2024-05-09 PROCEDURE — 3078F DIAST BP <80 MM HG: CPT | Performed by: INTERNAL MEDICINE

## 2024-05-09 PROCEDURE — 1159F MED LIST DOCD IN RCRD: CPT | Performed by: INTERNAL MEDICINE

## 2024-05-09 RX ORDER — GABAPENTIN 100 MG/1
CAPSULE ORAL
Qty: 60 CAPSULE | Refills: 0 | Status: SHIPPED | OUTPATIENT
Start: 2024-05-09

## 2024-05-16 ENCOUNTER — HOSPITAL ENCOUNTER (INPATIENT)
Facility: HOSPITAL | Age: 74
LOS: 7 days | Discharge: HOME | End: 2024-05-23
Attending: INTERNAL MEDICINE | Admitting: INTERNAL MEDICINE
Payer: MEDICARE

## 2024-05-16 ENCOUNTER — APPOINTMENT (OUTPATIENT)
Dept: GENERAL RADIOLOGY | Facility: HOSPITAL | Age: 74
End: 2024-05-16
Payer: MEDICARE

## 2024-05-16 ENCOUNTER — ANESTHESIA EVENT (OUTPATIENT)
Dept: GASTROENTEROLOGY | Facility: HOSPITAL | Age: 74
End: 2024-05-16
Payer: MEDICARE

## 2024-05-16 ENCOUNTER — ANESTHESIA (OUTPATIENT)
Dept: GASTROENTEROLOGY | Facility: HOSPITAL | Age: 74
End: 2024-05-16
Payer: MEDICARE

## 2024-05-16 DIAGNOSIS — M54.50 CHRONIC LEFT-SIDED LOW BACK PAIN WITHOUT SCIATICA: ICD-10-CM

## 2024-05-16 DIAGNOSIS — G89.29 CHRONIC LEFT-SIDED LOW BACK PAIN WITHOUT SCIATICA: ICD-10-CM

## 2024-05-16 DIAGNOSIS — J95.811 POSTPROCEDURAL PNEUMOTHORAX: Primary | ICD-10-CM

## 2024-05-16 DIAGNOSIS — J18.9 PNEUMONIA: ICD-10-CM

## 2024-05-16 PROBLEM — J93.9 PNEUMOTHORAX: Status: ACTIVE | Noted: 2024-05-16

## 2024-05-16 LAB
ANION GAP SERPL CALCULATED.3IONS-SCNC: 14 MMOL/L (ref 5–15)
APPEARANCE FLD: ABNORMAL
B PARAPERT DNA SPEC QL NAA+PROBE: NOT DETECTED
B PERT DNA SPEC QL NAA+PROBE: NOT DETECTED
BASOPHILS # BLD AUTO: 0.03 10*3/MM3 (ref 0–0.2)
BASOPHILS NFR BLD AUTO: 0.2 % (ref 0–1.5)
BUN SERPL-MCNC: 9 MG/DL (ref 8–23)
BUN/CREAT SERPL: 14.5 (ref 7–25)
C PNEUM DNA NPH QL NAA+NON-PROBE: NOT DETECTED
CALCIUM SPEC-SCNC: 8.9 MG/DL (ref 8.6–10.5)
CHLORIDE SERPL-SCNC: 96 MMOL/L (ref 98–107)
CO2 SERPL-SCNC: 24 MMOL/L (ref 22–29)
COLOR FLD: ABNORMAL
CREAT SERPL-MCNC: 0.62 MG/DL (ref 0.57–1)
DEPRECATED RDW RBC AUTO: 41.9 FL (ref 37–54)
EGFRCR SERPLBLD CKD-EPI 2021: 93 ML/MIN/1.73
EOSINOPHIL # BLD AUTO: 0 10*3/MM3 (ref 0–0.4)
EOSINOPHIL NFR BLD AUTO: 0 % (ref 0.3–6.2)
ERYTHROCYTE [DISTWIDTH] IN BLOOD BY AUTOMATED COUNT: 13.3 % (ref 12.3–15.4)
FLUAV SUBTYP SPEC NAA+PROBE: NOT DETECTED
FLUBV RNA ISLT QL NAA+PROBE: NOT DETECTED
GIE STN SPEC: NORMAL
GLUCOSE BLDC GLUCOMTR-MCNC: 129 MG/DL (ref 70–130)
GLUCOSE SERPL-MCNC: 182 MG/DL (ref 65–99)
HADV DNA SPEC NAA+PROBE: NOT DETECTED
HCOV 229E RNA SPEC QL NAA+PROBE: NOT DETECTED
HCOV HKU1 RNA SPEC QL NAA+PROBE: NOT DETECTED
HCOV NL63 RNA SPEC QL NAA+PROBE: NOT DETECTED
HCOV OC43 RNA SPEC QL NAA+PROBE: NOT DETECTED
HCT VFR BLD AUTO: 32.8 % (ref 34–46.6)
HGB BLD-MCNC: 10.8 G/DL (ref 12–15.9)
HMPV RNA NPH QL NAA+NON-PROBE: NOT DETECTED
HPIV1 RNA ISLT QL NAA+PROBE: NOT DETECTED
HPIV2 RNA SPEC QL NAA+PROBE: NOT DETECTED
HPIV3 RNA NPH QL NAA+PROBE: NOT DETECTED
HPIV4 P GENE NPH QL NAA+PROBE: NOT DETECTED
IMM GRANULOCYTES # BLD AUTO: 0.12 10*3/MM3 (ref 0–0.05)
IMM GRANULOCYTES NFR BLD AUTO: 0.7 % (ref 0–0.5)
LYMPHOCYTES # BLD AUTO: 0.68 10*3/MM3 (ref 0.7–3.1)
LYMPHOCYTES NFR BLD AUTO: 3.9 % (ref 19.6–45.3)
LYMPHOCYTES NFR FLD MANUAL: 2 %
M PNEUMO IGG SER IA-ACNC: NOT DETECTED
MCH RBC QN AUTO: 28.9 PG (ref 26.6–33)
MCHC RBC AUTO-ENTMCNC: 32.9 G/DL (ref 31.5–35.7)
MCV RBC AUTO: 87.7 FL (ref 79–97)
METHOD: ABNORMAL
MONOCYTES # BLD AUTO: 0.59 10*3/MM3 (ref 0.1–0.9)
MONOCYTES NFR BLD AUTO: 3.4 % (ref 5–12)
MONOS+MACROS NFR FLD: 2 %
NEUTROPHILS NFR BLD AUTO: 16.11 10*3/MM3 (ref 1.7–7)
NEUTROPHILS NFR BLD AUTO: 91.8 % (ref 42.7–76)
NEUTROPHILS NFR FLD MANUAL: 96 %
NRBC BLD AUTO-RTO: 0 /100 WBC (ref 0–0.2)
NT-PROBNP SERPL-MCNC: 279 PG/ML (ref 0–1800)
NUC CELL # FLD: 3100 /MM3
PLATELET # BLD AUTO: 461 10*3/MM3 (ref 140–450)
PMV BLD AUTO: 9.9 FL (ref 6–12)
POTASSIUM SERPL-SCNC: 4.3 MMOL/L (ref 3.5–5.2)
PROCALCITONIN SERPL-MCNC: <0.02 NG/ML (ref 0–0.25)
QT INTERVAL: 295 MS
QTC INTERVAL: 429 MS
RBC # BLD AUTO: 3.74 10*6/MM3 (ref 3.77–5.28)
RBC # FLD AUTO: 8500 /MM3
RHINOVIRUS RNA SPEC NAA+PROBE: DETECTED
RSV RNA NPH QL NAA+NON-PROBE: NOT DETECTED
SARS-COV-2 RNA NPH QL NAA+NON-PROBE: NOT DETECTED
SODIUM SERPL-SCNC: 134 MMOL/L (ref 136–145)
WBC NRBC COR # BLD AUTO: 17.53 10*3/MM3 (ref 3.4–10.8)

## 2024-05-16 PROCEDURE — 87798 DETECT AGENT NOS DNA AMP: CPT | Performed by: INTERNAL MEDICINE

## 2024-05-16 PROCEDURE — 87206 SMEAR FLUORESCENT/ACID STAI: CPT | Performed by: INTERNAL MEDICINE

## 2024-05-16 PROCEDURE — 88312 SPECIAL STAINS GROUP 1: CPT | Performed by: INTERNAL MEDICINE

## 2024-05-16 PROCEDURE — 87385 HISTOPLASMA CAPSUL AG IA: CPT | Performed by: INTERNAL MEDICINE

## 2024-05-16 PROCEDURE — 88305 TISSUE EXAM BY PATHOLOGIST: CPT | Performed by: INTERNAL MEDICINE

## 2024-05-16 PROCEDURE — 87176 TISSUE HOMOGENIZATION CULTR: CPT | Performed by: INTERNAL MEDICINE

## 2024-05-16 PROCEDURE — 25810000003 LACTATED RINGERS PER 1000 ML: Performed by: INTERNAL MEDICINE

## 2024-05-16 PROCEDURE — 0B9J8ZX DRAINAGE OF LEFT LOWER LUNG LOBE, VIA NATURAL OR ARTIFICIAL OPENING ENDOSCOPIC, DIAGNOSTIC: ICD-10-PCS | Performed by: INTERNAL MEDICINE

## 2024-05-16 PROCEDURE — 87071 CULTURE AEROBIC QUANT OTHER: CPT | Performed by: INTERNAL MEDICINE

## 2024-05-16 PROCEDURE — 80048 BASIC METABOLIC PNL TOTAL CA: CPT | Performed by: INTERNAL MEDICINE

## 2024-05-16 PROCEDURE — 94799 UNLISTED PULMONARY SVC/PX: CPT

## 2024-05-16 PROCEDURE — 76000 FLUOROSCOPY <1 HR PHYS/QHP: CPT

## 2024-05-16 PROCEDURE — 94640 AIRWAY INHALATION TREATMENT: CPT

## 2024-05-16 PROCEDURE — 71045 X-RAY EXAM CHEST 1 VIEW: CPT

## 2024-05-16 PROCEDURE — 83880 ASSAY OF NATRIURETIC PEPTIDE: CPT | Performed by: INTERNAL MEDICINE

## 2024-05-16 PROCEDURE — 0B9H8ZX DRAINAGE OF LUNG LINGULA, VIA NATURAL OR ARTIFICIAL OPENING ENDOSCOPIC, DIAGNOSTIC: ICD-10-PCS | Performed by: INTERNAL MEDICINE

## 2024-05-16 PROCEDURE — 87070 CULTURE OTHR SPECIMN AEROBIC: CPT | Performed by: INTERNAL MEDICINE

## 2024-05-16 PROCEDURE — 84145 PROCALCITONIN (PCT): CPT | Performed by: INTERNAL MEDICINE

## 2024-05-16 PROCEDURE — 94761 N-INVAS EAR/PLS OXIMETRY MLT: CPT

## 2024-05-16 PROCEDURE — 88341 IMHCHEM/IMCYTCHM EA ADD ANTB: CPT | Performed by: INTERNAL MEDICINE

## 2024-05-16 PROCEDURE — 87205 SMEAR GRAM STAIN: CPT | Performed by: INTERNAL MEDICINE

## 2024-05-16 PROCEDURE — 0202U NFCT DS 22 TRGT SARS-COV-2: CPT | Performed by: INTERNAL MEDICINE

## 2024-05-16 PROCEDURE — 93010 ELECTROCARDIOGRAM REPORT: CPT | Performed by: INTERNAL MEDICINE

## 2024-05-16 PROCEDURE — 89051 BODY FLUID CELL COUNT: CPT | Performed by: INTERNAL MEDICINE

## 2024-05-16 PROCEDURE — 87116 MYCOBACTERIA CULTURE: CPT | Performed by: INTERNAL MEDICINE

## 2024-05-16 PROCEDURE — 87305 ASPERGILLUS AG IA: CPT | Performed by: INTERNAL MEDICINE

## 2024-05-16 PROCEDURE — 25010000002 PROPOFOL 10 MG/ML EMULSION: Performed by: NURSE ANESTHETIST, CERTIFIED REGISTERED

## 2024-05-16 PROCEDURE — 85025 COMPLETE CBC W/AUTO DIFF WBC: CPT | Performed by: INTERNAL MEDICINE

## 2024-05-16 PROCEDURE — 87102 FUNGUS ISOLATION CULTURE: CPT | Performed by: INTERNAL MEDICINE

## 2024-05-16 PROCEDURE — 93005 ELECTROCARDIOGRAM TRACING: CPT | Performed by: INTERNAL MEDICINE

## 2024-05-16 PROCEDURE — 0BBG8ZX EXCISION OF LEFT UPPER LUNG LOBE, VIA NATURAL OR ARTIFICIAL OPENING ENDOSCOPIC, DIAGNOSTIC: ICD-10-PCS | Performed by: INTERNAL MEDICINE

## 2024-05-16 PROCEDURE — 88112 CYTOPATH CELL ENHANCE TECH: CPT | Performed by: INTERNAL MEDICINE

## 2024-05-16 PROCEDURE — 0B9C8ZX DRAINAGE OF RIGHT UPPER LUNG LOBE, VIA NATURAL OR ARTIFICIAL OPENING ENDOSCOPIC, DIAGNOSTIC: ICD-10-PCS | Performed by: INTERNAL MEDICINE

## 2024-05-16 PROCEDURE — 88342 IMHCHEM/IMCYTCHM 1ST ANTB: CPT | Performed by: INTERNAL MEDICINE

## 2024-05-16 PROCEDURE — 82948 REAGENT STRIP/BLOOD GLUCOSE: CPT

## 2024-05-16 RX ORDER — IPRATROPIUM BROMIDE AND ALBUTEROL SULFATE 2.5; .5 MG/3ML; MG/3ML
3 SOLUTION RESPIRATORY (INHALATION)
Status: DISCONTINUED | OUTPATIENT
Start: 2024-05-16 | End: 2024-05-16 | Stop reason: HOSPADM

## 2024-05-16 RX ORDER — ACETAMINOPHEN 325 MG/1
650 TABLET ORAL EVERY 4 HOURS PRN
Status: DISCONTINUED | OUTPATIENT
Start: 2024-05-16 | End: 2024-05-23 | Stop reason: HOSPADM

## 2024-05-16 RX ORDER — LIDOCAINE HYDROCHLORIDE 20 MG/ML
INJECTION, SOLUTION EPIDURAL; INFILTRATION; INTRACAUDAL; PERINEURAL AS NEEDED
Status: DISCONTINUED | OUTPATIENT
Start: 2024-05-16 | End: 2024-05-16 | Stop reason: HOSPADM

## 2024-05-16 RX ORDER — DROPERIDOL 2.5 MG/ML
0.62 INJECTION, SOLUTION INTRAMUSCULAR; INTRAVENOUS
Status: DISCONTINUED | OUTPATIENT
Start: 2024-05-16 | End: 2024-05-16 | Stop reason: HOSPADM

## 2024-05-16 RX ORDER — SPIRONOLACTONE 100 MG/1
100 TABLET, FILM COATED ORAL DAILY
Status: DISCONTINUED | OUTPATIENT
Start: 2024-05-16 | End: 2024-05-23 | Stop reason: HOSPADM

## 2024-05-16 RX ORDER — ONDANSETRON 4 MG/1
4 TABLET, ORALLY DISINTEGRATING ORAL EVERY 6 HOURS PRN
Status: DISCONTINUED | OUTPATIENT
Start: 2024-05-16 | End: 2024-05-23 | Stop reason: HOSPADM

## 2024-05-16 RX ORDER — SODIUM CHLORIDE 9 MG/ML
40 INJECTION, SOLUTION INTRAVENOUS AS NEEDED
Status: DISCONTINUED | OUTPATIENT
Start: 2024-05-16 | End: 2024-05-23 | Stop reason: HOSPADM

## 2024-05-16 RX ORDER — POLYETHYLENE GLYCOL 3350 17 G/17G
17 POWDER, FOR SOLUTION ORAL DAILY PRN
Status: DISCONTINUED | OUTPATIENT
Start: 2024-05-16 | End: 2024-05-23 | Stop reason: HOSPADM

## 2024-05-16 RX ORDER — NALOXONE HCL 0.4 MG/ML
0.2 VIAL (ML) INJECTION AS NEEDED
Status: DISCONTINUED | OUTPATIENT
Start: 2024-05-16 | End: 2024-05-16 | Stop reason: HOSPADM

## 2024-05-16 RX ORDER — ONDANSETRON 2 MG/ML
4 INJECTION INTRAMUSCULAR; INTRAVENOUS EVERY 6 HOURS PRN
Status: DISCONTINUED | OUTPATIENT
Start: 2024-05-16 | End: 2024-05-23 | Stop reason: HOSPADM

## 2024-05-16 RX ORDER — AMOXICILLIN 250 MG
2 CAPSULE ORAL 2 TIMES DAILY PRN
Status: DISCONTINUED | OUTPATIENT
Start: 2024-05-16 | End: 2024-05-23 | Stop reason: HOSPADM

## 2024-05-16 RX ORDER — PROMETHAZINE HYDROCHLORIDE 25 MG/1
25 TABLET ORAL ONCE AS NEEDED
Status: DISCONTINUED | OUTPATIENT
Start: 2024-05-16 | End: 2024-05-16 | Stop reason: HOSPADM

## 2024-05-16 RX ORDER — AMITRIPTYLINE HYDROCHLORIDE 10 MG/1
20 TABLET, FILM COATED ORAL NIGHTLY
Status: DISCONTINUED | OUTPATIENT
Start: 2024-05-16 | End: 2024-05-23 | Stop reason: HOSPADM

## 2024-05-16 RX ORDER — CETIRIZINE HYDROCHLORIDE 10 MG/1
10 TABLET ORAL DAILY
Status: DISCONTINUED | OUTPATIENT
Start: 2024-05-16 | End: 2024-05-23 | Stop reason: HOSPADM

## 2024-05-16 RX ORDER — SODIUM CHLORIDE, SODIUM LACTATE, POTASSIUM CHLORIDE, CALCIUM CHLORIDE 600; 310; 30; 20 MG/100ML; MG/100ML; MG/100ML; MG/100ML
30 INJECTION, SOLUTION INTRAVENOUS CONTINUOUS PRN
Status: DISCONTINUED | OUTPATIENT
Start: 2024-05-16 | End: 2024-05-16

## 2024-05-16 RX ORDER — SODIUM CHLORIDE 0.9 % (FLUSH) 0.9 %
10 SYRINGE (ML) INJECTION AS NEEDED
Status: DISCONTINUED | OUTPATIENT
Start: 2024-05-16 | End: 2024-05-23 | Stop reason: HOSPADM

## 2024-05-16 RX ORDER — LIDOCAINE HYDROCHLORIDE 10 MG/ML
INJECTION, SOLUTION EPIDURAL; INFILTRATION; INTRACAUDAL; PERINEURAL AS NEEDED
Status: DISCONTINUED | OUTPATIENT
Start: 2024-05-16 | End: 2024-05-16 | Stop reason: HOSPADM

## 2024-05-16 RX ORDER — BUDESONIDE AND FORMOTEROL FUMARATE DIHYDRATE 160; 4.5 UG/1; UG/1
2 AEROSOL RESPIRATORY (INHALATION)
Status: DISCONTINUED | OUTPATIENT
Start: 2024-05-16 | End: 2024-05-23 | Stop reason: HOSPADM

## 2024-05-16 RX ORDER — NICOTINE 21 MG/24HR
1 PATCH, TRANSDERMAL 24 HOURS TRANSDERMAL EVERY 24 HOURS
Status: DISCONTINUED | OUTPATIENT
Start: 2024-05-16 | End: 2024-05-16

## 2024-05-16 RX ORDER — SODIUM CHLORIDE 0.9 % (FLUSH) 0.9 %
10 SYRINGE (ML) INJECTION EVERY 12 HOURS SCHEDULED
Status: DISCONTINUED | OUTPATIENT
Start: 2024-05-16 | End: 2024-05-23 | Stop reason: HOSPADM

## 2024-05-16 RX ORDER — PANTOPRAZOLE SODIUM 40 MG/1
40 TABLET, DELAYED RELEASE ORAL
Status: DISCONTINUED | OUTPATIENT
Start: 2024-05-17 | End: 2024-05-23 | Stop reason: HOSPADM

## 2024-05-16 RX ORDER — FLUMAZENIL 0.1 MG/ML
0.2 INJECTION INTRAVENOUS AS NEEDED
Status: DISCONTINUED | OUTPATIENT
Start: 2024-05-16 | End: 2024-05-16 | Stop reason: HOSPADM

## 2024-05-16 RX ORDER — ROSUVASTATIN CALCIUM 10 MG/1
10 TABLET, COATED ORAL DAILY
Status: DISCONTINUED | OUTPATIENT
Start: 2024-05-16 | End: 2024-05-23 | Stop reason: HOSPADM

## 2024-05-16 RX ORDER — BISACODYL 5 MG/1
5 TABLET, DELAYED RELEASE ORAL DAILY PRN
Status: DISCONTINUED | OUTPATIENT
Start: 2024-05-16 | End: 2024-05-23 | Stop reason: HOSPADM

## 2024-05-16 RX ORDER — PROPOFOL 10 MG/ML
VIAL (ML) INTRAVENOUS AS NEEDED
Status: DISCONTINUED | OUTPATIENT
Start: 2024-05-16 | End: 2024-05-16 | Stop reason: SURG

## 2024-05-16 RX ORDER — BISACODYL 10 MG
10 SUPPOSITORY, RECTAL RECTAL DAILY PRN
Status: DISCONTINUED | OUTPATIENT
Start: 2024-05-16 | End: 2024-05-23 | Stop reason: HOSPADM

## 2024-05-16 RX ORDER — NICOTINE 21 MG/24HR
1 PATCH, TRANSDERMAL 24 HOURS TRANSDERMAL EVERY 24 HOURS
Status: DISCONTINUED | OUTPATIENT
Start: 2024-05-16 | End: 2024-05-16 | Stop reason: SDUPTHER

## 2024-05-16 RX ORDER — NITROGLYCERIN 0.4 MG/1
0.4 TABLET SUBLINGUAL
Status: DISCONTINUED | OUTPATIENT
Start: 2024-05-16 | End: 2024-05-23 | Stop reason: HOSPADM

## 2024-05-16 RX ORDER — ONDANSETRON 2 MG/ML
4 INJECTION INTRAMUSCULAR; INTRAVENOUS ONCE AS NEEDED
Status: DISCONTINUED | OUTPATIENT
Start: 2024-05-16 | End: 2024-05-16 | Stop reason: HOSPADM

## 2024-05-16 RX ORDER — PROMETHAZINE HYDROCHLORIDE 25 MG/1
25 SUPPOSITORY RECTAL ONCE AS NEEDED
Status: DISCONTINUED | OUTPATIENT
Start: 2024-05-16 | End: 2024-05-16 | Stop reason: HOSPADM

## 2024-05-16 RX ORDER — DIPHENHYDRAMINE HYDROCHLORIDE 50 MG/ML
12.5 INJECTION INTRAMUSCULAR; INTRAVENOUS
Status: DISCONTINUED | OUTPATIENT
Start: 2024-05-16 | End: 2024-05-16 | Stop reason: HOSPADM

## 2024-05-16 RX ORDER — HYDROCODONE BITARTRATE AND ACETAMINOPHEN 5; 325 MG/1; MG/1
1 TABLET ORAL EVERY 4 HOURS PRN
Status: DISPENSED | OUTPATIENT
Start: 2024-05-16 | End: 2024-05-21

## 2024-05-16 RX ORDER — CILOSTAZOL 100 MG/1
50 TABLET ORAL 2 TIMES DAILY
Status: DISCONTINUED | OUTPATIENT
Start: 2024-05-16 | End: 2024-05-23 | Stop reason: HOSPADM

## 2024-05-16 RX ORDER — ACETAMINOPHEN 650 MG/1
650 SUPPOSITORY RECTAL EVERY 4 HOURS PRN
Status: DISCONTINUED | OUTPATIENT
Start: 2024-05-16 | End: 2024-05-23 | Stop reason: HOSPADM

## 2024-05-16 RX ORDER — ACETAMINOPHEN 160 MG/5ML
650 SOLUTION ORAL EVERY 4 HOURS PRN
Status: DISCONTINUED | OUTPATIENT
Start: 2024-05-16 | End: 2024-05-23 | Stop reason: HOSPADM

## 2024-05-16 RX ORDER — CITALOPRAM 20 MG/1
20 TABLET ORAL DAILY
Status: DISCONTINUED | OUTPATIENT
Start: 2024-05-16 | End: 2024-05-23 | Stop reason: HOSPADM

## 2024-05-16 RX ORDER — MONTELUKAST SODIUM 10 MG/1
10 TABLET ORAL DAILY
Status: DISCONTINUED | OUTPATIENT
Start: 2024-05-16 | End: 2024-05-23 | Stop reason: HOSPADM

## 2024-05-16 RX ADMIN — IPRATROPIUM BROMIDE AND ALBUTEROL SULFATE 3 ML: 2.5; .5 SOLUTION RESPIRATORY (INHALATION) at 14:28

## 2024-05-16 RX ADMIN — AMITRIPTYLINE HYDROCHLORIDE 20 MG: 10 TABLET, FILM COATED ORAL at 20:48

## 2024-05-16 RX ADMIN — CILOSTAZOL 50 MG: 100 TABLET ORAL at 20:48

## 2024-05-16 RX ADMIN — ROSUVASTATIN CALCIUM 10 MG: 10 TABLET, FILM COATED ORAL at 17:46

## 2024-05-16 RX ADMIN — CETIRIZINE HYDROCHLORIDE 10 MG: 10 TABLET ORAL at 17:46

## 2024-05-16 RX ADMIN — METOPROLOL TARTRATE 25 MG: 25 TABLET, FILM COATED ORAL at 17:19

## 2024-05-16 RX ADMIN — SODIUM CHLORIDE, POTASSIUM CHLORIDE, SODIUM LACTATE AND CALCIUM CHLORIDE 30 ML/HR: 600; 310; 30; 20 INJECTION, SOLUTION INTRAVENOUS at 12:50

## 2024-05-16 RX ADMIN — HYDROCODONE BITARTRATE AND ACETAMINOPHEN 1 TABLET: 5; 325 TABLET ORAL at 22:25

## 2024-05-16 RX ADMIN — PROPOFOL 150 MCG/KG/MIN: 10 INJECTION, EMULSION INTRAVENOUS at 13:23

## 2024-05-16 RX ADMIN — Medication 10 ML: at 20:48

## 2024-05-16 RX ADMIN — MONTELUKAST SODIUM 10 MG: 10 TABLET, FILM COATED ORAL at 17:46

## 2024-05-16 RX ADMIN — CITALOPRAM 20 MG: 20 TABLET, FILM COATED ORAL at 17:46

## 2024-05-16 RX ADMIN — PROPOFOL 150 MG: 10 INJECTION, EMULSION INTRAVENOUS at 13:21

## 2024-05-16 RX ADMIN — SPIRONOLACTONE 100 MG: 100 TABLET ORAL at 17:46

## 2024-05-16 NOTE — ANESTHESIA POSTPROCEDURE EVALUATION
Patient: Che Landaverde    Procedure Summary       Date: 05/16/24 Room / Location:  [facility]    Anesthesia Start: 1316 Anesthesia Stop: 1351    Procedure: BRONCHOSCOPY WITH FLUORO with BAL and biopsy Diagnosis:     Surgeons: Ramon Edwards MD Provider: Sharif Middleton MD    Anesthesia Type: general ASA Status: 3            Anesthesia Type: general    Vitals  Vitals Value Taken Time   /61 05/16/24 1352   Temp     Pulse 130 05/16/24 1405   Resp 18 05/16/24 1352   SpO2 92 % 05/16/24 1405   Vitals shown include unfiled device data.        Post Anesthesia Care and Evaluation    Patient location during evaluation: bedside  Patient participation: complete - patient participated  Level of consciousness: awake and alert  Pain management: adequate    Airway patency: patent  Anesthetic complications: No anesthetic complications  PONV Status: controlled  Cardiovascular status: blood pressure returned to baseline and acceptable  Respiratory status: acceptable  Hydration status: acceptable

## 2024-05-16 NOTE — PLAN OF CARE
Goal Outcome Evaluation:  Patient new to [facility] from [facility]. Patient is alert and oriented x4. On 6L NC. Medicated per MAR. Plan of care ongoing.

## 2024-05-16 NOTE — ANESTHESIA PROCEDURE NOTES
Airway  Urgency: elective    Date/Time: 5/16/2024 1:22 PM  Airway not difficult    General Information and Staff    Patient location during procedure: OR  CRNA/CAA: Dima Serrano CRNA    Indications and Patient Condition  Indications for airway management: airway protection    Preoxygenated: yes  Mask difficulty assessment: 1 - vent by mask    Final Airway Details  Final airway type: supraglottic airway      Successful airway: LMA  Size 4     Number of attempts at approach: 1  Assessment: lips, teeth, and gum same as pre-op and atraumatic intubation

## 2024-05-16 NOTE — ANESTHESIA PREPROCEDURE EVALUATION
Anesthesia Evaluation     Patient summary reviewed and Nursing notes reviewed   NPO Solid Status: > 8 hours  NPO Liquid Status: > 2 hours           Airway   Mallampati: II  TM distance: >3 FB  Neck ROM: full  No difficulty expected  Dental      Pulmonary - normal exam    breath sounds clear to auscultation  (+) pleural effusion, a smoker Former, COPD, asthma,    ROS comment: Lung nodule/chronic bronchitis/chronic cough  Cardiovascular     ECG reviewed  Rhythm: regular  Rate: abnormal    (+) hypertension 2 medications or greater, PVD    ROS comment: EF 70% by ECHO 8/20  PE comment: ST, rate 136    Neuro/Psych  (+) headaches, psychiatric history Anxiety    ROS Comment: Migraines  GI/Hepatic/Renal/Endo    (+) GERD, diabetes mellitus    ROS Comment: Celiac disease    Musculoskeletal     (+) back pain  Abdominal  - normal exam   Substance History      OB/GYN          Other   arthritis,                   Anesthesia Plan    ASA 3     general   total IV anesthesia  (LMA/will try and treat tachycardia preop with IV phenylephrine and metoprolol)  intravenous induction     Anesthetic plan, risks, benefits, and alternatives have been provided, discussed and informed consent has been obtained with: patient.      CODE STATUS:

## 2024-05-17 ENCOUNTER — APPOINTMENT (OUTPATIENT)
Dept: GENERAL RADIOLOGY | Facility: HOSPITAL | Age: 74
End: 2024-05-17
Payer: MEDICARE

## 2024-05-17 ENCOUNTER — APPOINTMENT (OUTPATIENT)
Dept: CT IMAGING | Facility: HOSPITAL | Age: 74
End: 2024-05-17
Payer: MEDICARE

## 2024-05-17 PROCEDURE — 94664 DEMO&/EVAL PT USE INHALER: CPT

## 2024-05-17 PROCEDURE — 94799 UNLISTED PULMONARY SVC/PX: CPT

## 2024-05-17 PROCEDURE — 99152 MOD SED SAME PHYS/QHP 5/>YRS: CPT

## 2024-05-17 PROCEDURE — 25010000002 MIDAZOLAM PER 1 MG: Performed by: RADIOLOGY

## 2024-05-17 PROCEDURE — 94761 N-INVAS EAR/PLS OXIMETRY MLT: CPT

## 2024-05-17 PROCEDURE — 71046 X-RAY EXAM CHEST 2 VIEWS: CPT

## 2024-05-17 PROCEDURE — 25010000002 LIDOCAINE 1 % SOLUTION: Performed by: RADIOLOGY

## 2024-05-17 PROCEDURE — C1729 CATH, DRAINAGE: HCPCS

## 2024-05-17 PROCEDURE — 71045 X-RAY EXAM CHEST 1 VIEW: CPT

## 2024-05-17 PROCEDURE — 25010000002 MORPHINE PER 10 MG: Performed by: INTERNAL MEDICINE

## 2024-05-17 PROCEDURE — 25010000002 FENTANYL CITRATE (PF) 50 MCG/ML SOLUTION: Performed by: RADIOLOGY

## 2024-05-17 PROCEDURE — 0W9B30Z DRAINAGE OF LEFT PLEURAL CAVITY WITH DRAINAGE DEVICE, PERCUTANEOUS APPROACH: ICD-10-PCS | Performed by: RADIOLOGY

## 2024-05-17 RX ORDER — GUAIFENESIN/DEXTROMETHORPHAN 100-10MG/5
5 SYRUP ORAL EVERY 4 HOURS PRN
Status: DISCONTINUED | OUTPATIENT
Start: 2024-05-17 | End: 2024-05-21

## 2024-05-17 RX ORDER — LIDOCAINE HYDROCHLORIDE 10 MG/ML
20 INJECTION, SOLUTION INFILTRATION; PERINEURAL ONCE
Status: COMPLETED | OUTPATIENT
Start: 2024-05-17 | End: 2024-05-17

## 2024-05-17 RX ORDER — MIDAZOLAM HYDROCHLORIDE 1 MG/ML
INJECTION INTRAMUSCULAR; INTRAVENOUS AS NEEDED
Status: COMPLETED | OUTPATIENT
Start: 2024-05-17 | End: 2024-05-17

## 2024-05-17 RX ORDER — MORPHINE SULFATE 2 MG/ML
2 INJECTION, SOLUTION INTRAMUSCULAR; INTRAVENOUS
Status: DISPENSED | OUTPATIENT
Start: 2024-05-17 | End: 2024-05-22

## 2024-05-17 RX ORDER — BENZONATATE 100 MG/1
200 CAPSULE ORAL 3 TIMES DAILY PRN
Status: DISCONTINUED | OUTPATIENT
Start: 2024-05-17 | End: 2024-05-23 | Stop reason: HOSPADM

## 2024-05-17 RX ORDER — FENTANYL CITRATE 50 UG/ML
INJECTION, SOLUTION INTRAMUSCULAR; INTRAVENOUS AS NEEDED
Status: COMPLETED | OUTPATIENT
Start: 2024-05-17 | End: 2024-05-17

## 2024-05-17 RX ADMIN — METOPROLOL TARTRATE 25 MG: 25 TABLET, FILM COATED ORAL at 20:22

## 2024-05-17 RX ADMIN — METOPROLOL TARTRATE 25 MG: 25 TABLET, FILM COATED ORAL at 09:15

## 2024-05-17 RX ADMIN — AMITRIPTYLINE HYDROCHLORIDE 20 MG: 10 TABLET, FILM COATED ORAL at 20:21

## 2024-05-17 RX ADMIN — BUDESONIDE AND FORMOTEROL FUMARATE DIHYDRATE 2 PUFF: 160; 4.5 AEROSOL RESPIRATORY (INHALATION) at 07:38

## 2024-05-17 RX ADMIN — CITALOPRAM 20 MG: 20 TABLET, FILM COATED ORAL at 09:15

## 2024-05-17 RX ADMIN — MORPHINE SULFATE 2 MG: 2 INJECTION, SOLUTION INTRAMUSCULAR; INTRAVENOUS at 21:48

## 2024-05-17 RX ADMIN — CETIRIZINE HYDROCHLORIDE 10 MG: 10 TABLET ORAL at 09:15

## 2024-05-17 RX ADMIN — HYDROCODONE BITARTRATE AND ACETAMINOPHEN 1 TABLET: 5; 325 TABLET ORAL at 20:32

## 2024-05-17 RX ADMIN — PANTOPRAZOLE SODIUM 40 MG: 40 TABLET, DELAYED RELEASE ORAL at 07:00

## 2024-05-17 RX ADMIN — CILOSTAZOL 50 MG: 100 TABLET ORAL at 20:21

## 2024-05-17 RX ADMIN — CILOSTAZOL 50 MG: 100 TABLET ORAL at 09:15

## 2024-05-17 RX ADMIN — MONTELUKAST SODIUM 10 MG: 10 TABLET, FILM COATED ORAL at 09:15

## 2024-05-17 RX ADMIN — HYDROCODONE BITARTRATE AND ACETAMINOPHEN 1 TABLET: 5; 325 TABLET ORAL at 13:35

## 2024-05-17 RX ADMIN — MIDAZOLAM 1 MG: 1 INJECTION INTRAMUSCULAR; INTRAVENOUS at 10:15

## 2024-05-17 RX ADMIN — BENZONATATE 200 MG: 100 CAPSULE ORAL at 13:35

## 2024-05-17 RX ADMIN — ROSUVASTATIN CALCIUM 10 MG: 10 TABLET, FILM COATED ORAL at 09:15

## 2024-05-17 RX ADMIN — FENTANYL CITRATE 25 MCG: 50 INJECTION, SOLUTION INTRAMUSCULAR; INTRAVENOUS at 10:15

## 2024-05-17 RX ADMIN — Medication 10 ML: at 09:16

## 2024-05-17 RX ADMIN — BENZONATATE 200 MG: 100 CAPSULE ORAL at 04:26

## 2024-05-17 RX ADMIN — LIDOCAINE HYDROCHLORIDE 20 ML: 10 INJECTION, SOLUTION INFILTRATION; PERINEURAL at 10:17

## 2024-05-17 RX ADMIN — ACETAMINOPHEN 650 MG: 325 TABLET, FILM COATED ORAL at 11:01

## 2024-05-17 RX ADMIN — SPIRONOLACTONE 100 MG: 100 TABLET ORAL at 09:15

## 2024-05-17 RX ADMIN — MORPHINE SULFATE 2 MG: 2 INJECTION, SOLUTION INTRAMUSCULAR; INTRAVENOUS at 18:21

## 2024-05-17 RX ADMIN — Medication 10 ML: at 21:00

## 2024-05-17 RX ADMIN — TIOTROPIUM BROMIDE INHALATION SPRAY 2 PUFF: 3.12 SPRAY, METERED RESPIRATORY (INHALATION) at 07:39

## 2024-05-17 NOTE — PROGRESS NOTES
[facility]    Called by RN that chest tube had been pulled out some but still remains subcutaneous.  No change in oxygenation.  Stat cxr reviewed at bedside showing migration of chest tube, and improvement in pneumothorax.  No large airleak from chest tube.  Patient on 4lnc with saturation of 96%  she has no conversational dyspnea.  She is having discomfort around chest tube site.     Repeat cxr in one hour.  Consult stat thoracic surgery to evaluate chest tube placement, adjustments, replacement prn.  Dw patient to inform nurse with any worsening shah.  Pain control.   Brayan rn at bedside.    Electronically signed by Carlo Edwards MD, 05/17/24, 5:57 PM EDT.

## 2024-05-17 NOTE — CASE MANAGEMENT/SOCIAL WORK
Continued Stay Note  [facility]     Patient Name: Che Landaverde  MRN: [id number]  Today's Date: 5/17/2024    Admit Date: 5/16/2024    Plan: Plan home.   LISA Brown RN   Discharge Plan       Row Name 05/17/24 0800       Plan    Plan Plan home.   LISA Brown RN    Patient/Family in Agreement with Plan yes    Plan Comments FACE SHEET VERIFIED/ IM LETTER SIGNED.  Spoke with pt at bedside. Pt's PCP is Dr. Myrna Tafoya.  Pt lives alone in a single story house.  Pt is independent with ADLs. Pt has a cane, grab bar, nebulizer and shower chair for home use if needed. Pt gets her prescriptions from [facility] in [place] and [facility].  Pt denies any issues affording medications. Pt is not current with . Pt has not been in SNF. Pt states her nephew (Dewayne Finley [phone]) and niece (Guillermina Finley) will assist her at home if needed.  Family will transport her home. Pt denies any needs at this time. Plan home.  LISA Brown RN                   Discharge Codes    No documentation.                 Expected Discharge Date and Time       Expected Discharge Date Expected Discharge Time    May 20, 2024               Lianet Brown RN

## 2024-05-17 NOTE — PLAN OF CARE
"Goal Outcome Evaluation:  Plan of Care Reviewed With: patient        Progress: no change  Outcome Evaluation: Pt with sudden pain to chest tube site, Dr. Martinez notified ordered for stat cxr, which is completed and Dr. Martinez came up to room , spokw with pt and familt, consult for thosracic surgeon placed and called .Dr. Choi returned call and order for cxr in am, cancel repeat cxr this evening, medicate for pain, and per Dr. Choi pt chest looks great, \"no pneumothorax\". Keep chest tube as it is. Familly updated, Pt medicated for pain as ordered. No acute distress, remians on 5l O2 satting 97-98 , will attempt to titrate to 4L and monitor pt. sr on tele. vss                               "

## 2024-05-17 NOTE — CASE MANAGEMENT/SOCIAL WORK
Discharge Planning Assessment  [facility]     Patient Name: Che Landaverde  MRN: [id number]  Today's Date: 5/17/2024    Admit Date: 5/16/2024    Plan: Plan home.   LISA Brown RN   Discharge Needs Assessment       Row Name 05/17/24 0758       Living Environment    People in Home alone    Current Living Arrangements home    Potentially Unsafe Housing Conditions none    Primary Care Provided by self    Provides Primary Care For no one    Family Caregiver if Needed other relative(s)    Family Caregiver Names Nephew (Dewayne Finley [phone]) and Niece  (Guillermina Finley)    Quality of Family Relationships involved;supportive    Able to Return to Prior Arrangements yes    Living Arrangement Comments Pt lives alone in a single story house.       Resource/Environmental Concerns    Resource/Environmental Concerns none    Transportation Concerns none       Transition Planning    Patient/Family Anticipates Transition to home    Patient/Family Anticipated Services at Transition none    Transportation Anticipated family or friend will provide       Discharge Needs Assessment    Readmission Within the Last 30 Days no previous admission in last 30 days    Equipment Currently Used at Home cane, straight;grab bar;nebulizer;rollator    Concerns to be Addressed no discharge needs identified;denies needs/concerns at this time    Anticipated Changes Related to Illness none    Equipment Needed After Discharge cane, straight;grab bar, tub/shower;nebulizer;rollator                   Discharge Plan       Row Name 05/17/24 0800       Plan    Plan Plan home.   LISA Brown RN    Patient/Family in Agreement with Plan yes    Plan Comments FACE SHEET VERIFIED/ IM LETTER SIGNED.  Spoke with pt at bedside. Pt's PCP is Dr. Myrna Tafoya.  Pt lives alone in a single story house.  Pt is independent with ADLs. Pt has a cane, grab bar, nebulizer and shower chair for home use if needed. Pt gets her prescriptions from [facility] in [place] and  [facility].  Pt denies any issues affording medications. Pt is not current with HH. Pt has not been in SNF. Pt states her nephew (Dewayne Finley [phone]) and niece (Guillermina Finley) will assist her at home if needed.  Family will transport her home. Pt denies any needs at this time. Plan home.  LISA Brown RN                  Continued Care and Services - Admitted Since 5/16/2024    No active coordination exists for this encounter.       Expected Discharge Date and Time       Expected Discharge Date Expected Discharge Time    May 20, 2024            Demographic Summary       Row Name 05/17/24 0754       General Information    Admission Type inpatient    Arrived From physician office - internal    Required Notices Provided Important Message from Medicare    Referral Source admission list    Reason for Consult discharge planning    Preferred Language English                   Functional Status       Row Name 05/17/24 0755       Functional Status    Usual Activity Tolerance good    Current Activity Tolerance moderate       Functional Status, IADL    Medications independent    Meal Preparation independent    Housekeeping independent    Laundry independent    Shopping independent       Mental Status    General Appearance WDL WDL                   Psychosocial    No documentation.                  Abuse/Neglect    No documentation.                  Legal    No documentation.                  Substance Abuse    No documentation.                  Patient Forms    No documentation.                     Lianet Brown, RN

## 2024-05-17 NOTE — PROGRESS NOTES
Pulm/ CC Note    Called about critical finding of moderate left-sided pneumothorax- discussed with Dr. Santos. STAT CXR PA and lateral ordered.    Addendum:  Spoke with Dr. Moyer about left pneumothorax increasing in size. Discussed with Dr. Santos, plan to laterally transfer patient to a thoracic floor (5E or CCU).

## 2024-05-17 NOTE — PROGRESS NOTES
[facility]     Mar/chart reviewed  Follow up pneumothorax  Some mild shortness of breath  Has some left sided chest discomfort      Vital Sign Min/Max for last 24 hours  Temp  Min: 97.5 °F (36.4 °C)  Max: 98.6 °F (37 °C)   BP  Min: 93/62  Max: 138/69   Pulse  Min: 91  Max: 137   Resp  Min: 14  Max: 25   SpO2  Min: 84 %  Max: 99 %   Flow (L/min)  Min: 4.5  Max: 12   Weight  Min: 73 kg (161 lb)  Max: 73 kg (161 lb)     Nad, axox3,   perrl, eomi, no icterus,  mmm, no jvd, trachea midline, neck supple,  chest decreased ae bilaterally, no crackles, no wheezes,   rrr,   soft, nt, nd +bs,  no c/c/ e  Skin warm, dry no rashes    Labs: 5/16: reviewed:    Wbc 17  Hgb 10.8  Plts 461  Procal 0.02    CXR: 5/17: about 30% left sided pneumothorax    A/P:  Pneumothorax -- will get chest tube placement in IR, hopefully can be removed quickly she would like to go to [facility]'s graduation on Sunday  Bilateral pulmonary infiltrates -- s/p bronchoscopy with biopsy - cultures pending at this time,   Rhiniovirus infection -- I don't think that accounts for persistent infiltrates on ct; care is supportive, await further cultures and biopsy result   COPD -bronchodilators.

## 2024-05-18 ENCOUNTER — APPOINTMENT (OUTPATIENT)
Dept: GENERAL RADIOLOGY | Facility: HOSPITAL | Age: 74
End: 2024-05-18
Payer: MEDICARE

## 2024-05-18 LAB
ARTERIAL PATENCY WRIST A: POSITIVE
ATMOSPHERIC PRESS: 745.4 MMHG
BACTERIA SPEC AEROBE CULT: ABNORMAL
BASE EXCESS BLDA CALC-SCNC: 2.8 MMOL/L (ref 0–2)
BDY SITE: ABNORMAL
CO2 BLDA-SCNC: 30.1 MMOL/L (ref 23–27)
GAS FLOW AIRWAY: 7 LPM
GRAM STN SPEC: ABNORMAL
GRAM STN SPEC: ABNORMAL
HCO3 BLDA-SCNC: 28.6 MMOL/L (ref 22–28)
HEMODILUTION: NO
LAB AP CASE REPORT: NORMAL
LAB AP SPECIAL STAINS: NORMAL
MODALITY: ABNORMAL
PATH REPORT.FINAL DX SPEC: NORMAL
PATH REPORT.GROSS SPEC: NORMAL
PCO2 BLDA: 48 MM HG (ref 35–45)
PH BLDA: 7.38 PH UNITS (ref 7.35–7.45)
PO2 BLDA: 79.6 MM HG (ref 80–100)
SAO2 % BLDCOA: 95.3 % (ref 92–98.5)
TOTAL RATE: 20 BREATHS/MINUTE

## 2024-05-18 PROCEDURE — 99221 1ST HOSP IP/OBS SF/LOW 40: CPT | Performed by: THORACIC SURGERY (CARDIOTHORACIC VASCULAR SURGERY)

## 2024-05-18 PROCEDURE — 94761 N-INVAS EAR/PLS OXIMETRY MLT: CPT

## 2024-05-18 PROCEDURE — 94799 UNLISTED PULMONARY SVC/PX: CPT

## 2024-05-18 PROCEDURE — 25010000002 MORPHINE PER 10 MG: Performed by: INTERNAL MEDICINE

## 2024-05-18 PROCEDURE — 71045 X-RAY EXAM CHEST 1 VIEW: CPT

## 2024-05-18 PROCEDURE — 94760 N-INVAS EAR/PLS OXIMETRY 1: CPT

## 2024-05-18 PROCEDURE — 94664 DEMO&/EVAL PT USE INHALER: CPT

## 2024-05-18 PROCEDURE — 25010000002 FUROSEMIDE PER 20 MG

## 2024-05-18 PROCEDURE — 36600 WITHDRAWAL OF ARTERIAL BLOOD: CPT

## 2024-05-18 PROCEDURE — 82803 BLOOD GASES ANY COMBINATION: CPT

## 2024-05-18 PROCEDURE — 85379 FIBRIN DEGRADATION QUANT: CPT

## 2024-05-18 RX ORDER — FUROSEMIDE 10 MG/ML
40 INJECTION INTRAMUSCULAR; INTRAVENOUS ONCE
Status: COMPLETED | OUTPATIENT
Start: 2024-05-18 | End: 2024-05-18

## 2024-05-18 RX ORDER — IPRATROPIUM BROMIDE AND ALBUTEROL SULFATE 2.5; .5 MG/3ML; MG/3ML
3 SOLUTION RESPIRATORY (INHALATION) 4 TIMES DAILY PRN
Status: DISCONTINUED | OUTPATIENT
Start: 2024-05-18 | End: 2024-05-21

## 2024-05-18 RX ADMIN — AMITRIPTYLINE HYDROCHLORIDE 20 MG: 10 TABLET, FILM COATED ORAL at 20:43

## 2024-05-18 RX ADMIN — ACETAMINOPHEN 650 MG: 325 TABLET, FILM COATED ORAL at 20:49

## 2024-05-18 RX ADMIN — METOPROLOL TARTRATE 25 MG: 25 TABLET, FILM COATED ORAL at 08:41

## 2024-05-18 RX ADMIN — BUDESONIDE AND FORMOTEROL FUMARATE DIHYDRATE 2 PUFF: 160; 4.5 AEROSOL RESPIRATORY (INHALATION) at 20:03

## 2024-05-18 RX ADMIN — IPRATROPIUM BROMIDE AND ALBUTEROL SULFATE 3 ML: .5; 3 SOLUTION RESPIRATORY (INHALATION) at 21:18

## 2024-05-18 RX ADMIN — ROSUVASTATIN CALCIUM 10 MG: 10 TABLET, FILM COATED ORAL at 08:42

## 2024-05-18 RX ADMIN — MORPHINE SULFATE 2 MG: 2 INJECTION, SOLUTION INTRAMUSCULAR; INTRAVENOUS at 08:42

## 2024-05-18 RX ADMIN — CETIRIZINE HYDROCHLORIDE 10 MG: 10 TABLET ORAL at 08:42

## 2024-05-18 RX ADMIN — MORPHINE SULFATE 2 MG: 2 INJECTION, SOLUTION INTRAMUSCULAR; INTRAVENOUS at 04:43

## 2024-05-18 RX ADMIN — HYDROCODONE BITARTRATE AND ACETAMINOPHEN 1 TABLET: 5; 325 TABLET ORAL at 06:16

## 2024-05-18 RX ADMIN — BENZONATATE 200 MG: 100 CAPSULE ORAL at 22:37

## 2024-05-18 RX ADMIN — FUROSEMIDE 40 MG: 10 INJECTION, SOLUTION INTRAMUSCULAR; INTRAVENOUS at 22:37

## 2024-05-18 RX ADMIN — CILOSTAZOL 50 MG: 100 TABLET ORAL at 20:43

## 2024-05-18 RX ADMIN — HYDROCODONE BITARTRATE AND ACETAMINOPHEN 1 TABLET: 5; 325 TABLET ORAL at 20:43

## 2024-05-18 RX ADMIN — SPIRONOLACTONE 100 MG: 100 TABLET ORAL at 08:42

## 2024-05-18 RX ADMIN — METOPROLOL TARTRATE 25 MG: 25 TABLET, FILM COATED ORAL at 18:27

## 2024-05-18 RX ADMIN — Medication 10 ML: at 08:43

## 2024-05-18 RX ADMIN — CILOSTAZOL 50 MG: 100 TABLET ORAL at 08:43

## 2024-05-18 RX ADMIN — Medication 10 ML: at 20:52

## 2024-05-18 RX ADMIN — MONTELUKAST SODIUM 10 MG: 10 TABLET, FILM COATED ORAL at 08:42

## 2024-05-18 RX ADMIN — MORPHINE SULFATE 2 MG: 2 INJECTION, SOLUTION INTRAMUSCULAR; INTRAVENOUS at 17:41

## 2024-05-18 RX ADMIN — CITALOPRAM 20 MG: 20 TABLET, FILM COATED ORAL at 08:42

## 2024-05-18 RX ADMIN — IPRATROPIUM BROMIDE AND ALBUTEROL SULFATE 3 ML: .5; 3 SOLUTION RESPIRATORY (INHALATION) at 16:03

## 2024-05-18 RX ADMIN — PANTOPRAZOLE SODIUM 40 MG: 40 TABLET, DELAYED RELEASE ORAL at 06:16

## 2024-05-18 NOTE — CONSULTS
Initial Hospital Consult    Reason for consult: Pneumothorax  Requesting physician: Michelel    Chief complaint: Pneumonia    Subjective     History of Present Illness  Ms. Main is a pleasant 73-year-old lady who presented to the hospital with complaints of productive cough and underwent a bronchoscopy.  She had a resultant pneumothorax and underwent CT-guided chest tube placement.  Review of Systems   Constitutional:  Positive for activity change.   HENT: Negative.     Eyes: Negative.    Respiratory:  Positive for cough.    Cardiovascular: Negative.    Gastrointestinal: Negative.    Endocrine: Negative.    Genitourinary: Negative.    Musculoskeletal: Negative.    Skin: Negative.    Allergic/Immunologic: Negative.    Neurological: Negative.    Hematological: Negative.    Psychiatric/Behavioral: Negative.          Past Medical History:   Diagnosis Date    Aggressive former smoker     Allergic rhinitis     Anxiety     Asthma     Atherosclerotic PVD with intermittent claudication 11/29/2022    Benign essential hypertension     Chronic obstructive pulmonary disease, unspecified COPD type 11/30/2022    Cough     Diabetes mellitus     Diarrhea     Encounter for screening for lung cancer     GERD (gastroesophageal reflux disease)     Hip arthrosis     Internal hemorrhoids     Laceration of skin of lower leg     Lung nodule     Microscopic colitis     Migraine, unspecified, not intractable, without status migrainosus     Need for influenza vaccination     Need for pneumococcal vaccination     Osteoporosis     Painful hip     Pleural effusion 08/27/2020    Reactive airway disease with wheezing with acute exacerbation     Seasonal allergies     Sleep disturbances     Smoking history     Varicose veins of bilateral lower extremities with other complications 11/30/2022    Venous insufficiency (chronic) (peripheral) 11/29/2022    Venous ulcer 11/29/2022   ,   Past Surgical History:   Procedure Laterality Date    BRAVO  PROCEDURE N/A 2016    Procedure: ESOPHAGOGASTRODUODENOSCOPY AND BRAVO ;  Surgeon: Laila Trevino MD;  Location: [facility] ENDOSCOPY;  Service:     CATARACT EXTRACTION  2015    COLONOSCOPY  2015    ih 3/15 Dr. Trevino    SIGMOIDOSCOPY N/A 2016    Procedure: SIGMOIDOSCOPY FLEXIBLE to transverse colon with biopsy;  Surgeon: Laila Trevino MD;  Location: [facility] ENDOSCOPY;  Service:    ,   Family History   Problem Relation Age of Onset    Heart failure Mother     Emphysema Mother     Other Sister         Brain tumor    Lung cancer Brother    ,   Social History     Socioeconomic History    Marital status: Single   Tobacco Use    Smoking status: Former     Current packs/day: 0.00     Average packs/day: 2.0 packs/day for 44.0 years (88.0 ttl pk-yrs)     Types: Cigarettes     Start date: 1967     Quit date: 2010     Years since quittin.3    Smokeless tobacco: Never    Tobacco comments:     smoked 44 years, 2 packs a day    Vaping Use    Vaping status: Never Used   Substance and Sexual Activity    Alcohol use: Yes     Alcohol/week: 2.0 standard drinks of alcohol     Types: 2 Cans of beer per week     Comment: socially    Drug use: No    Sexual activity: Not Currently     E-cigarette/Vaping    E-cigarette/Vaping Use Never User     Passive Exposure No     Counseling Given No      E-cigarette/Vaping Substances    Nicotine No     THC No     CBD No     Flavoring No      E-cigarette/Vaping Devices    Disposable No     Pre-filled or Refillable Cartridge No     Refillable Tank No     Pre-filled Pod No          , and Allergies:  Gluten meal    Objective      Vital Signs  Temp:  [97.1 °F (36.2 °C)-98 °F (36.7 °C)] 97.8 °F (36.6 °C)  Heart Rate:  [72-85] 73  Resp:  [16] 16  BP: (111-149)/(57-69) 123/57    Physical Exam  Vitals and nursing note reviewed.   Constitutional:       Appearance: She is well-developed.   HENT:      Head: Normocephalic and atraumatic.      Nose: Nose normal.   Eyes:       Conjunctiva/sclera: Conjunctivae normal.   Cardiovascular:      Rate and Rhythm: Normal rate.   Pulmonary:      Effort: Pulmonary effort is normal.   Abdominal:      Palpations: Abdomen is soft.   Musculoskeletal:      Cervical back: Neck supple.   Skin:     General: Skin is warm and dry.   Neurological:      Mental Status: She is alert and oriented to person, place, and time.   Psychiatric:         Behavior: Behavior normal.         Thought Content: Thought content normal.         Judgment: Judgment normal.     Chest tube :   Site: Left, Clean, Dry, and Intact  Suction: -20 cm  Air Leak: positive  24 Hour Total: Normal    Results Review:    I reviewed the patient's new clinical results.  I reviewed the patient's new imaging results and agree with the interpretation.  I reviewed the patient's other test results and agree with the interpretation    Lab Results:                WBC   Date Value Ref Range Status   05/16/2024 17.53 (H) 3.40 - 10.80 10*3/mm3 Final     Hemoglobin   Date Value Ref Range Status   05/16/2024 10.8 (L) 12.0 - 15.9 g/dL Final     Hematocrit   Date Value Ref Range Status   05/16/2024 32.8 (L) 34.0 - 46.6 % Final     Platelets   Date Value Ref Range Status   05/16/2024 461 (H) 140 - 450 10*3/mm3 Final     Sodium   Date Value Ref Range Status   05/16/2024 134 (L) 136 - 145 mmol/L Final     Potassium   Date Value Ref Range Status   05/16/2024 4.3 3.5 - 5.2 mmol/L Final     Chloride   Date Value Ref Range Status   05/16/2024 96 (L) 98 - 107 mmol/L Final     CO2   Date Value Ref Range Status   05/16/2024 24.0 22.0 - 29.0 mmol/L Final     BUN   Date Value Ref Range Status   05/16/2024 9 8 - 23 mg/dL Final     Creatinine   Date Value Ref Range Status   05/16/2024 0.62 0.57 - 1.00 mg/dL Final     Glucose   Date Value Ref Range Status   05/16/2024 182 (H) 65 - 99 mg/dL Final         Assessment & Plan       Pneumothorax      Assessment & Plan  Ms. Main is a pleasant 73-year-old lady status post  bronchoscopy with pneumothorax.  She does have an air leak on her chest tube today.  I have increased to suction to -40.  Her chest tube appears to be in good position inside the chest and she only has a small residual pneumothorax.  I have increased her suction to -40 for now to evacuate the remainder of the pneumothorax.  We will monitor this and hopefully can decrease dissection and get the tube out Monday or so.  I discussed the patients findings and my recommendations with patient and nursing staff    Thank you for this consult and allowing us to participate in the care of your patient.  We will follow along with you during this hospitalization.       Milagro Choi MD  [facility]  05/18/24  12:26 EDT

## 2024-05-18 NOTE — PLAN OF CARE
Goal Outcome Evaluation:  Plan of Care Reviewed With: patient           Outcome Evaluation: Outcome Evaluation: Pt A/O*4. Pt pain needs managed with prn morphine and oxycodone per orders, O2 titrated between 4L and 5L NC throught the night to maintain oxygen above 90. No acute distress noted.

## 2024-05-18 NOTE — PROGRESS NOTES
"                                              LOS: 2 days   Patient Care Team:  Myrna Tafoya MD as PCP - General (Internal Medicine)  Iza Porter MD as Consulting Physician (Obstetrics and Gynecology)  Ramon Edwards MD as Consulting Physician (Pulmonary Disease)  Bryce Navarro MD as Consulting Physician (Otolaryngology)  Annamaria Sequeira MD as Consulting Physician (Dermatology)    Chief Complaint:  F/up pneumothorax, COPD and medical problems listed below.    Subjective   Interval History  I reviewed the admission note, progress notes, PMH, PSH, Family hx, social history, imagings and prior records on this admission, summarized the finding in my note and formulated a transition of care plan.      Chest tube on suction.  No air leak.  Reported mild cough, improved.  Has mild chest pain at the site of the chest tube.  On oxygen 4 L/man    REVIEW OF SYSTEMS:   CARDIOVASCULAR: No chest pain, chest pressure or chest discomfort. No palpitations or edema. .   GASTROINTESTINAL: No anorexia, nausea, vomiting or diarrhea. No abdominal pain.  CONSTITUTIONAL: No fever or chills.     Ventilator/Non-Invasive Ventilation Settings (From admission, onward)      None                  Physical Exam:     Vital Signs  Temp:  [97.1 °F (36.2 °C)-98 °F (36.7 °C)] 97.8 °F (36.6 °C)  Heart Rate:  [72-85] 73  Resp:  [16] 16  BP: (111-149)/(57-69) 123/57    Intake/Output Summary (Last 24 hours) at 5/18/2024 1244  Last data filed at 5/18/2024 0840  Gross per 24 hour   Intake 600 ml   Output 0 ml   Net 600 ml     Flowsheet Rows      Flowsheet Row First Filed Value   Admission Height 170.2 cm (67\") Documented at 05/16/2024 1238   Admission Weight 73 kg (161 lb) Documented at 05/16/2024 1238            PPE used per hospital policy    General Appearance:   Alert, cooperative, in no acute distress   ENMT:  Mallampati score 3, moist mucous membrane   Eyes:  Pupils equal and reactive to light. EOMI   Neck:   Trachea midline. No " thyromegaly.   Lungs:   Diffuse bilateral expiratory wheezing.  No crackles.  Diminished air entry throughout    Heart:   Regular rhythm and normal rate, normal S1 and S2, no         murmur   Skin:   No rash or ecchymosis   Abdomen:    Obese. Soft. No tenderness. No HSM.   Neuro/psych:  Conscious, alert, oriented x3. Strength 5/5 in upper and lower  ext.  Appropriate mood and affect   Extremities:  No cyanosis, clubbing or edema.  Warm extremities and well-perfused          Results Review:        Results from last 7 days   Lab Units 05/16/24  1756   SODIUM mmol/L 134*   POTASSIUM mmol/L 4.3   CHLORIDE mmol/L 96*   CO2 mmol/L 24.0   BUN mg/dL 9   CREATININE mg/dL 0.62   GLUCOSE mg/dL 182*   CALCIUM mg/dL 8.9         Results from last 7 days   Lab Units 05/16/24  1755   WBC 10*3/mm3 17.53*   HEMOGLOBIN g/dL 10.8*   HEMATOCRIT % 32.8*   PLATELETS 10*3/mm3 461*         Results from last 7 days   Lab Units 05/16/24  1756   PROBNP pg/mL 279.0                           I reviewed the patient's new clinical results.        Medication Review:   amitriptyline, 20 mg, Oral, Nightly  budesonide-formoterol, 2 puff, Inhalation, BID - RT  cetirizine, 10 mg, Oral, Daily  cilostazol, 50 mg, Oral, BID  citalopram, 20 mg, Oral, Daily  metoprolol tartrate, 25 mg, Oral, Q12H  montelukast, 10 mg, Oral, Daily  pantoprazole, 40 mg, Oral, Q AM  rosuvastatin, 10 mg, Oral, Daily  sodium chloride, 10 mL, Intravenous, Q12H  spironolactone, 100 mg, Oral, Daily  tiotropium bromide monohydrate, 2 puff, Inhalation, Daily - RT             Diagnostic imaging:  I personally and independently reviewed the following images:  CXR 5/18/2024: Subtle bilateral infiltrates.  No pneumothorax.    Assessment     Iatrogenic left pneumothorax  Bilateral pulmonary infiltrates/nodules, s/p bronch with TBB and BAL 5/16/2024.  Path normal  Rhinovirus infection  COPD exacerbation due to viral illness  Acute hypoxic respiratory failure    All problems new to me    Plan        Oxygen by NC and titrate to keep SpO2 >90%.  I reduced it down to 2 L/min.  Will continue to titrate down gradually  Daily CXR.  Could consider placing the chest tube on waterseal.  Noted consult to thoracic surgery.  Will defer chest tube management to them.  Symbicort 2 puffs twice daily and Spiriva 2 puffs daily  Albuterol neb as needed  Singulair 10 mg daily      Codie Wilson MD  05/18/24  12:44 EDT      This note was dictated utilizing Dragon dictation

## 2024-05-18 NOTE — PLAN OF CARE
Goal Outcome Evaluation:  Plan of Care Reviewed With: patient        Progress: no change  Outcome Evaluation: Monitor pain,labs,and vitals. VSS. O2 3-5L NC. PRN breathing treatments. Encouraged IS use and activity. Pain controlled with PRN medications per orders. Will continue to monitor.

## 2024-05-19 ENCOUNTER — APPOINTMENT (OUTPATIENT)
Dept: GENERAL RADIOLOGY | Facility: HOSPITAL | Age: 74
End: 2024-05-19
Payer: MEDICARE

## 2024-05-19 LAB
D DIMER PPP FEU-MCNC: 2.8 MCGFEU/ML (ref 0–0.75)
P JIROVECII DNA # SPEC NAA+PROBE: NEGATIVE {COPIES}/ML
SPECIMEN SOURCE: NORMAL

## 2024-05-19 PROCEDURE — 71045 X-RAY EXAM CHEST 1 VIEW: CPT

## 2024-05-19 PROCEDURE — 99232 SBSQ HOSP IP/OBS MODERATE 35: CPT | Performed by: THORACIC SURGERY (CARDIOTHORACIC VASCULAR SURGERY)

## 2024-05-19 PROCEDURE — 94761 N-INVAS EAR/PLS OXIMETRY MLT: CPT

## 2024-05-19 PROCEDURE — 94760 N-INVAS EAR/PLS OXIMETRY 1: CPT

## 2024-05-19 PROCEDURE — 94799 UNLISTED PULMONARY SVC/PX: CPT

## 2024-05-19 PROCEDURE — 94664 DEMO&/EVAL PT USE INHALER: CPT

## 2024-05-19 RX ADMIN — Medication 10 ML: at 09:01

## 2024-05-19 RX ADMIN — METOPROLOL TARTRATE 25 MG: 25 TABLET, FILM COATED ORAL at 08:59

## 2024-05-19 RX ADMIN — AMITRIPTYLINE HYDROCHLORIDE 20 MG: 10 TABLET, FILM COATED ORAL at 20:21

## 2024-05-19 RX ADMIN — ROSUVASTATIN CALCIUM 10 MG: 10 TABLET, FILM COATED ORAL at 09:01

## 2024-05-19 RX ADMIN — BENZONATATE 200 MG: 100 CAPSULE ORAL at 06:05

## 2024-05-19 RX ADMIN — HYDROCODONE BITARTRATE AND ACETAMINOPHEN 1 TABLET: 5; 325 TABLET ORAL at 20:21

## 2024-05-19 RX ADMIN — GUAIFENESIN SYRUP AND DEXTROMETHORPHAN 5 ML: 100; 10 SYRUP ORAL at 09:00

## 2024-05-19 RX ADMIN — BUDESONIDE AND FORMOTEROL FUMARATE DIHYDRATE 2 PUFF: 160; 4.5 AEROSOL RESPIRATORY (INHALATION) at 07:32

## 2024-05-19 RX ADMIN — CITALOPRAM 20 MG: 20 TABLET, FILM COATED ORAL at 09:01

## 2024-05-19 RX ADMIN — BENZONATATE 200 MG: 100 CAPSULE ORAL at 20:21

## 2024-05-19 RX ADMIN — HYDROCODONE BITARTRATE AND ACETAMINOPHEN 1 TABLET: 5; 325 TABLET ORAL at 09:00

## 2024-05-19 RX ADMIN — BUDESONIDE AND FORMOTEROL FUMARATE DIHYDRATE 2 PUFF: 160; 4.5 AEROSOL RESPIRATORY (INHALATION) at 19:47

## 2024-05-19 RX ADMIN — PANTOPRAZOLE SODIUM 40 MG: 40 TABLET, DELAYED RELEASE ORAL at 06:05

## 2024-05-19 RX ADMIN — Medication 10 ML: at 21:00

## 2024-05-19 RX ADMIN — CILOSTAZOL 50 MG: 100 TABLET ORAL at 20:21

## 2024-05-19 RX ADMIN — CETIRIZINE HYDROCHLORIDE 10 MG: 10 TABLET ORAL at 09:01

## 2024-05-19 RX ADMIN — CILOSTAZOL 50 MG: 100 TABLET ORAL at 09:00

## 2024-05-19 RX ADMIN — SPIRONOLACTONE 100 MG: 100 TABLET ORAL at 08:59

## 2024-05-19 RX ADMIN — MONTELUKAST SODIUM 10 MG: 10 TABLET, FILM COATED ORAL at 09:00

## 2024-05-19 NOTE — PROGRESS NOTES
"                                              LOS: 3 days   Patient Care Team:  Myrna Tafoya MD as PCP - General (Internal Medicine)  Iza Porter MD as Consulting Physician (Obstetrics and Gynecology)  Ramon Edwards MD as Consulting Physician (Pulmonary Disease)  Bryce Navarro MD as Consulting Physician (Otolaryngology)  Annamaria Sequeira MD as Consulting Physician (Dermatology)    Chief Complaint:  F/up pneumothorax, COPD and medical problems listed below.    Subjective   Interval History      On 4 L oxygen.  Cough improved.  Continues to have mild dyspnea.    REVIEW OF SYSTEMS:   CARDIOVASCULAR: Mild chest pain at the site of insertion of the chest tube.  No palpitations or edema. .   GASTROINTESTINAL: No anorexia, nausea, vomiting or diarrhea. No abdominal pain.  CONSTITUTIONAL: No fever or chills.     Ventilator/Non-Invasive Ventilation Settings (From admission, onward)      None                  Physical Exam:     Vital Signs  Temp:  [98.3 °F (36.8 °C)-100.3 °F (37.9 °C)] 98.4 °F (36.9 °C)  Heart Rate:  [] 98  Resp:  [18-20] 18  BP: (109-142)/(57-71) 142/66    Intake/Output Summary (Last 24 hours) at 5/19/2024 1513  Last data filed at 5/19/2024 0858  Gross per 24 hour   Intake 600 ml   Output 1240 ml   Net -640 ml     Flowsheet Rows      Flowsheet Row First Filed Value   Admission Height 170.2 cm (67\") Documented at 05/16/2024 1238   Admission Weight 73 kg (161 lb) Documented at 05/16/2024 1238            PPE used per hospital policy    General Appearance:   Alert, cooperative, in no acute distress   ENMT:  Mallampati score 3, moist mucous membrane   Eyes:  Pupils equal and reactive to light. EOMI   Neck:   Trachea midline. No thyromegaly.   Lungs:   Equal but diminished air entry throughout.  Prolonged expiratory time.  No audible crackles or wheezing    Heart:   Regular rhythm and normal rate, normal S1 and S2, no         murmur   Skin:   No rash or ecchymosis   Abdomen:    Obese. Soft. " No tenderness. No HSM.   Neuro/psych:  Conscious, alert, oriented x3. Strength 5/5 in upper and lower  ext.  Appropriate mood and affect   Extremities:  No cyanosis, clubbing or edema.  Warm extremities and well-perfused          Results Review:        Results from last 7 days   Lab Units 05/16/24  1756   SODIUM mmol/L 134*   POTASSIUM mmol/L 4.3   CHLORIDE mmol/L 96*   CO2 mmol/L 24.0   BUN mg/dL 9   CREATININE mg/dL 0.62   GLUCOSE mg/dL 182*   CALCIUM mg/dL 8.9         Results from last 7 days   Lab Units 05/16/24  1755   WBC 10*3/mm3 17.53*   HEMOGLOBIN g/dL 10.8*   HEMATOCRIT % 32.8*   PLATELETS 10*3/mm3 461*         Results from last 7 days   Lab Units 05/16/24  1756   PROBNP pg/mL 279.0       Results from last 7 days   Lab Units 05/18/24  2331   D DIMER QUANT MCGFEU/mL 2.80*             Results from last 7 days   Lab Units 05/18/24  2338   PH, ARTERIAL pH units 7.384   PCO2, ARTERIAL mm Hg 48.0*   PO2 ART mm Hg 79.6*   O2 SATURATION ART % 95.3   FLOW RATE lpm 7.0000   MODALITY  HFNC         I reviewed the patient's new clinical results.        Medication Review:   amitriptyline, 20 mg, Oral, Nightly  budesonide-formoterol, 2 puff, Inhalation, BID - RT  cetirizine, 10 mg, Oral, Daily  cilostazol, 50 mg, Oral, BID  citalopram, 20 mg, Oral, Daily  metoprolol tartrate, 25 mg, Oral, Q12H  montelukast, 10 mg, Oral, Daily  pantoprazole, 40 mg, Oral, Q AM  rosuvastatin, 10 mg, Oral, Daily  sodium chloride, 10 mL, Intravenous, Q12H  spironolactone, 100 mg, Oral, Daily  tiotropium bromide monohydrate, 2 puff, Inhalation, Daily - RT             Diagnostic imaging:  I personally and independently reviewed the following images:  CXR 5/18/2024: Subtle bilateral infiltrates.  No pneumothorax.  CXR 5/19/2024: No pneumothorax    Assessment     Iatrogenic left pneumothorax  Bilateral pulmonary infiltrates/nodules, s/p bronch with TBB and BAL 5/16/2024.  Path normal  Rhinovirus infection  COPD exacerbation due to viral  illness  Acute hypoxic respiratory failure  Stress-induced leukocytosis      Plan       Oxygen by NC and titrate to keep SpO2 >90%.    Daily CXR.  Chest tube to -40.  Thoracic surgery following  Symbicort 2 puffs twice daily and Spiriva 2 puffs daily  Albuterol neb as needed  Singulair 10 mg daily  Repeat labs in a.m.  Continue amitriptyline  Spironolactone 100 mg daily      Codie Wilson MD  05/19/24  15:13 EDT      This note was dictated utilizing Dragon dictation

## 2024-05-19 NOTE — PROGRESS NOTES
"    Chief Complaint: Pneumothorax  S/P: Chest tube placement      Subjective  Is a little better today.  Vital Signs:  Temp:  [98 °F (36.7 °C)-99.3 °F (37.4 °C)] 98 °F (36.7 °C)  Heart Rate:  [] 83  Resp:  [18-20] 18  BP: (109-142)/(57-66) 135/63    Intake & Output (last day)         05/18 0701  05/19 0700 05/19 0701  05/20 0700    P.O. 600 120    Total Intake(mL/kg) 600 (8.2) 120 (1.6)    Urine (mL/kg/hr) 1200 (0.7)     Chest Tube 40     Total Output 1240     Net -640 +120          Urine Unmeasured Occurrence 4 x             Objective:  General Appearance:  Comfortable, well-appearing and in no acute distress.    Vital signs: (most recent): Blood pressure 135/63, pulse 83, temperature 98 °F (36.7 °C), temperature source Oral, resp. rate 18, height 170.2 cm (67\"), weight 73 kg (161 lb), SpO2 96%, not currently breastfeeding.  Vital signs are normal.    Lungs:  Normal effort.  There are decreased breath sounds.    Neurological: Patient is alert and oriented to person, place and time.    Skin:  Warm and dry.                Chest tube:   Site: Left, Clean, Dry, and Intact  Suction: -40 cm  Air Leak: positive  24 Hour Total: 40    Results Review:     I reviewed the patient's new clinical results.  I reviewed the patient's new imaging results and agree with the interpretation.  I reviewed the patient's other test results and agree with the interpretation    Imaging Results (Last 24 Hours)       Procedure Component Value Units Date/Time    XR Chest 1 View [[id number]] Collected: 05/19/24 0805     Updated: 05/19/24 0811    Narrative:      XR CHEST 1 VW-     HISTORY: Female who is 73 years-old, chest tube management     TECHNIQUE: Frontal view of the chest     COMPARISON: 5/18/2024     FINDINGS: Left chest tube appears stable. Heart size is borderline, with  slight prominence of vascular markings. Bilateral pulmonary opacities,  predominantly at the mid to lower lungs, appear mildly worse on the  right. No pleural " effusion, or definite pneumothorax. Old granulomatous  disease is present. No acute osseous process.       Impression:      As described.           This report was finalized on 5/19/2024 8:08 AM by Dr. Akbar Guallpa M.D on Workstation: [id number]       XR Chest 1 View [[id number]] Collected: 05/18/24 2222     Updated: 05/18/24 2229    Narrative:      XR CHEST 1 VW-     COMPARISON examination 5/18/2024 at 5:36 a.m., current examination 10:16  p.m.     FINDINGS: Left base chest tube in position as before, trace left apical  pneumothorax is suggested. There is bibasilar atelectasis/infiltrate  similar to the previous examination. There is now some vague opacity  demonstrated about the right and left hilum, which could represent new  airspace disease or early vascular congestion. There is no associated  pleural effusion seen. No other interval change has occurred.     This report was finalized on 5/18/2024 10:26 PM by Dr. Arnold Simons M.D on Workstation: [id number]               Lab Results:     Lab Results (last 24 hours)       Procedure Component Value Units Date/Time    BAL Culture, Quantitative - Lavage, Lung, Right Middle Lobe [[id number]] Collected: 05/16/24 1327    Specimen: Lavage from Lung, Right Middle Lobe Updated: 05/19/24 1024     BAL Culture No growth     Gram Stain Many (4+) WBCs seen      Many (4+) Gram positive cocci in chains    Narrative:      This culture was lost and had to be reprocessed. 5.18.2024 @1825    D-dimer, Quantitative [[id number]]  (Abnormal) Collected: 05/18/24 2331    Specimen: Blood Updated: 05/19/24 0001     D-Dimer, Quantitative 2.80 MCGFEU/mL     Narrative:      According to the assay 's published package insert, a normal (<0.50 MCGFEU/mL) D-dimer result in conjunction with a non-high clinical probability assessment, excludes deep vein thrombosis (DVT) and pulmonary embolism (PE) with high sensitivity.    D-dimer values increase with age and this can make VTE  "exclusion of an older population difficult. To address this, the [employer], based on best available evidence and recent guidelines, recommends that clinicians use age-adjusted D-dimer thresholds in patients greater than 50 years of age with: a) a low probability of PE who do not meet all Pulmonary Embolism Rule Out Criteria, or b) in those with intermediate probability of PE.   The formula for an age-adjusted D-dimer cut-off is \"age/100\".  For example, a 60 year old patient would have an age-adjusted cut-off of 0.60 MCGFEU/mL and an 80 year old 0.80 MCGFEU/mL.    Blood Gas, Arterial - [[id number]]  (Abnormal) Collected: 05/18/24 2338    Specimen: Arterial Blood Updated: 05/18/24 2341     Site Right Radial     Miller's Test Positive     pH, Arterial 7.384 pH units      pCO2, Arterial 48.0 mm Hg      pO2, Arterial 79.6 mm Hg      HCO3, Arterial 28.6 mmol/L      Base Excess, Arterial 2.8 mmol/L      Comment: Serial Number: [id number]:  [id number]        O2 Saturation, Arterial 95.3 %      CO2 Content 30.1 mmol/L      Barometric Pressure for Blood Gas 745.4000 mmHg      Modality HFNC     Flow Rate 7.0000 lpm      Rate 20 Breaths/minute      Hemodilution No             Assessment & Plan       Pneumothorax       Assessment & Plan  Ms. Main is a pleasant 73-year-old lady with a pneumothorax as well as pneumonia.  Her chest tube continues to demonstrate an air leak, although it is improved today.  Will plan to decrease the amount of suction to -20 and repeat an x-ray in the morning.  Milagro Choi MD  [facility]  05/19/24  18:20 EDT          "

## 2024-05-19 NOTE — PLAN OF CARE
Goal Outcome Evaluation:  Plan of Care Reviewed With: patient           Outcome Evaluation: Pt A/O*4. Pt pain needs managed with prn meds per orders, O2 titrated between 6L and 8L HF NC throught the night to maintain oxygen above 90. Low grade fever at beginning of shift relieved with tylenol, prn breathing treatments and tessalon pearls given for cough. pt compliant with plan of care.

## 2024-05-19 NOTE — PLAN OF CARE
Goal Outcome Evaluation:  Plan of Care Reviewed With: patient        Progress: no change  Outcome Evaluation: Monitor pain,labs,and vitals. VSS. O2 6L high flow NC. No increased needs for O2 on current shift. Bath completed on current shift. PRN medications for pain and cough. Pain controlled. CT to -20 suction per orders. Contacted CT about CTA of the chest-CT stated it will be completed tomorrow. Will continue to monitor.

## 2024-05-20 ENCOUNTER — APPOINTMENT (OUTPATIENT)
Dept: CT IMAGING | Facility: HOSPITAL | Age: 74
End: 2024-05-20
Payer: MEDICARE

## 2024-05-20 ENCOUNTER — APPOINTMENT (OUTPATIENT)
Dept: GENERAL RADIOLOGY | Facility: HOSPITAL | Age: 74
End: 2024-05-20
Payer: MEDICARE

## 2024-05-20 LAB
ANION GAP SERPL CALCULATED.3IONS-SCNC: 11 MMOL/L (ref 5–15)
BACTERIA SPEC AEROBE CULT: NO GROWTH
BASOPHILS # BLD AUTO: 0.03 10*3/MM3 (ref 0–0.2)
BASOPHILS NFR BLD AUTO: 0.2 % (ref 0–1.5)
BUN SERPL-MCNC: 9 MG/DL (ref 8–23)
BUN/CREAT SERPL: 16.7 (ref 7–25)
CALCIUM SPEC-SCNC: 8.8 MG/DL (ref 8.6–10.5)
CHLORIDE SERPL-SCNC: 94 MMOL/L (ref 98–107)
CO2 SERPL-SCNC: 30 MMOL/L (ref 22–29)
CREAT SERPL-MCNC: 0.54 MG/DL (ref 0.57–1)
CYTO UR: NORMAL
DEPRECATED RDW RBC AUTO: 40.7 FL (ref 37–54)
EGFRCR SERPLBLD CKD-EPI 2021: 96.1 ML/MIN/1.73
EOSINOPHIL # BLD AUTO: 0.21 10*3/MM3 (ref 0–0.4)
EOSINOPHIL NFR BLD AUTO: 1.3 % (ref 0.3–6.2)
ERYTHROCYTE [DISTWIDTH] IN BLOOD BY AUTOMATED COUNT: 13.3 % (ref 12.3–15.4)
GALACTOMANNAN AG SPEC IA-ACNC: 0.11 INDEX (ref 0–0.49)
GLUCOSE SERPL-MCNC: 100 MG/DL (ref 65–99)
GRAM STN SPEC: NORMAL
GRAM STN SPEC: NORMAL
HCT VFR BLD AUTO: 31.2 % (ref 34–46.6)
HGB BLD-MCNC: 10.4 G/DL (ref 12–15.9)
IMM GRANULOCYTES # BLD AUTO: 0.12 10*3/MM3 (ref 0–0.05)
IMM GRANULOCYTES NFR BLD AUTO: 0.7 % (ref 0–0.5)
LAB AP CASE REPORT: NORMAL
LAB AP CLINICAL INFORMATION: NORMAL
LYMPHOCYTES # BLD AUTO: 2.39 10*3/MM3 (ref 0.7–3.1)
LYMPHOCYTES NFR BLD AUTO: 14.8 % (ref 19.6–45.3)
MCH RBC QN AUTO: 28.4 PG (ref 26.6–33)
MCHC RBC AUTO-ENTMCNC: 33.3 G/DL (ref 31.5–35.7)
MCV RBC AUTO: 85.2 FL (ref 79–97)
MONOCYTES # BLD AUTO: 2.03 10*3/MM3 (ref 0.1–0.9)
MONOCYTES NFR BLD AUTO: 12.6 % (ref 5–12)
NEUTROPHILS NFR BLD AUTO: 11.32 10*3/MM3 (ref 1.7–7)
NEUTROPHILS NFR BLD AUTO: 70.4 % (ref 42.7–76)
NRBC BLD AUTO-RTO: 0 /100 WBC (ref 0–0.2)
PATH REPORT.FINAL DX SPEC: NORMAL
PATH REPORT.GROSS SPEC: NORMAL
PLATELET # BLD AUTO: 661 10*3/MM3 (ref 140–450)
PMV BLD AUTO: 9.4 FL (ref 6–12)
POTASSIUM SERPL-SCNC: 3.9 MMOL/L (ref 3.5–5.2)
RBC # BLD AUTO: 3.66 10*6/MM3 (ref 3.77–5.28)
SODIUM SERPL-SCNC: 135 MMOL/L (ref 136–145)
WBC NRBC COR # BLD AUTO: 16.1 10*3/MM3 (ref 3.4–10.8)

## 2024-05-20 PROCEDURE — 80048 BASIC METABOLIC PNL TOTAL CA: CPT | Performed by: INTERNAL MEDICINE

## 2024-05-20 PROCEDURE — 94761 N-INVAS EAR/PLS OXIMETRY MLT: CPT

## 2024-05-20 PROCEDURE — 25510000001 IOPAMIDOL PER 1 ML: Performed by: INTERNAL MEDICINE

## 2024-05-20 PROCEDURE — 94664 DEMO&/EVAL PT USE INHALER: CPT

## 2024-05-20 PROCEDURE — 94760 N-INVAS EAR/PLS OXIMETRY 1: CPT

## 2024-05-20 PROCEDURE — 94799 UNLISTED PULMONARY SVC/PX: CPT

## 2024-05-20 PROCEDURE — 71275 CT ANGIOGRAPHY CHEST: CPT

## 2024-05-20 PROCEDURE — 85025 COMPLETE CBC W/AUTO DIFF WBC: CPT | Performed by: INTERNAL MEDICINE

## 2024-05-20 PROCEDURE — 71045 X-RAY EXAM CHEST 1 VIEW: CPT

## 2024-05-20 RX ORDER — DIAZEPAM 2 MG/1
2 TABLET ORAL EVERY 6 HOURS PRN
Status: DISCONTINUED | OUTPATIENT
Start: 2024-05-20 | End: 2024-05-23 | Stop reason: HOSPADM

## 2024-05-20 RX ADMIN — CITALOPRAM 20 MG: 20 TABLET, FILM COATED ORAL at 08:49

## 2024-05-20 RX ADMIN — PANTOPRAZOLE SODIUM 40 MG: 40 TABLET, DELAYED RELEASE ORAL at 05:37

## 2024-05-20 RX ADMIN — GUAIFENESIN SYRUP AND DEXTROMETHORPHAN 5 ML: 100; 10 SYRUP ORAL at 18:01

## 2024-05-20 RX ADMIN — CILOSTAZOL 50 MG: 100 TABLET ORAL at 20:54

## 2024-05-20 RX ADMIN — TIOTROPIUM BROMIDE INHALATION SPRAY 2 PUFF: 3.12 SPRAY, METERED RESPIRATORY (INHALATION) at 10:17

## 2024-05-20 RX ADMIN — BUDESONIDE AND FORMOTEROL FUMARATE DIHYDRATE 2 PUFF: 160; 4.5 AEROSOL RESPIRATORY (INHALATION) at 18:40

## 2024-05-20 RX ADMIN — Medication 10 ML: at 20:59

## 2024-05-20 RX ADMIN — CILOSTAZOL 50 MG: 100 TABLET ORAL at 08:49

## 2024-05-20 RX ADMIN — METOPROLOL TARTRATE 25 MG: 25 TABLET, FILM COATED ORAL at 20:54

## 2024-05-20 RX ADMIN — BUDESONIDE AND FORMOTEROL FUMARATE DIHYDRATE 2 PUFF: 160; 4.5 AEROSOL RESPIRATORY (INHALATION) at 10:15

## 2024-05-20 RX ADMIN — ROSUVASTATIN CALCIUM 10 MG: 10 TABLET, FILM COATED ORAL at 08:49

## 2024-05-20 RX ADMIN — SPIRONOLACTONE 100 MG: 100 TABLET ORAL at 08:49

## 2024-05-20 RX ADMIN — Medication 10 ML: at 08:50

## 2024-05-20 RX ADMIN — METOPROLOL TARTRATE 25 MG: 25 TABLET, FILM COATED ORAL at 08:49

## 2024-05-20 RX ADMIN — HYDROCODONE BITARTRATE AND ACETAMINOPHEN 1 TABLET: 5; 325 TABLET ORAL at 08:49

## 2024-05-20 RX ADMIN — IOPAMIDOL 95 ML: 755 INJECTION, SOLUTION INTRAVENOUS at 12:59

## 2024-05-20 RX ADMIN — BENZONATATE 200 MG: 100 CAPSULE ORAL at 08:49

## 2024-05-20 RX ADMIN — BENZONATATE 200 MG: 100 CAPSULE ORAL at 20:53

## 2024-05-20 RX ADMIN — AMITRIPTYLINE HYDROCHLORIDE 20 MG: 10 TABLET, FILM COATED ORAL at 20:54

## 2024-05-20 RX ADMIN — CETIRIZINE HYDROCHLORIDE 10 MG: 10 TABLET ORAL at 08:49

## 2024-05-20 RX ADMIN — MONTELUKAST SODIUM 10 MG: 10 TABLET, FILM COATED ORAL at 08:49

## 2024-05-20 RX ADMIN — DIAZEPAM 2 MG: 2 TABLET ORAL at 20:54

## 2024-05-20 NOTE — PROGRESS NOTES
Patient off floor in radiology getting CT angiogram to rule out pulmonary embolus.  Has a small airleak to chest tube per RN report.  Patient reports significant discomfort to chest tube site.  We will add Valium 2 mg every 6 hours as needed for muscle spasms.  Chest tube to waterseal today and recheck a plain film in the morning.

## 2024-05-20 NOTE — PLAN OF CARE
Goal Outcome Evaluation:      Chest tube changed over to water seal this am. Another cxr ordered for tomorrow am. Ct completed. No pe. New iv was put in for ct and infiltrated immediately. New iv in. They put a warm compress on. When she got back to the floor warm compress was offered and refused. Oxygen weaned down to 4 liters nc. Keep sats >90. Prn norco and cough med given. Valium prn also ordered. Pt/ot ordered. Niece at bedside most of day. Up chair. Refusing IS, safety maintained. Michelle ANN

## 2024-05-20 NOTE — CASE MANAGEMENT/SOCIAL WORK
Continued Stay Note  [facility]     Patient Name: Che Landaverde  MRN: [id number]  Today's Date: 5/20/2024    Admit Date: 5/16/2024    Plan: Home   Discharge Plan       Row Name 05/20/24 1355       Plan    Plan Home    Patient/Family in Agreement with Plan yes    Plan Comments Met with pt's family in room. Pt is currently in CT. Family confirmed that the plan is for her to go home at discharge. Pt may need oxygen, walker, and commode at discharge. Family is also concerned that the pt has not been up out of bed but a handful of times this admission. Discussed this with NP and she placed orders for PT and OT. I told family that I would come back and speak to the pt about HH as well as medical equipment when she is back in her room. No further needs identified. RICARDO Ramirez RN                   Discharge Codes    No documentation.                 Expected Discharge Date and Time       Expected Discharge Date Expected Discharge Time    May 19, 2024               Federico Nuñez RN

## 2024-05-20 NOTE — PLAN OF CARE
Goal Outcome Evaluation:  Plan of Care Reviewed With: patient           Outcome Evaluation: Pt A/O*4. Pt pain needs managed with prn meds per orders, O2 AT 6L HF NC throught the night to maintain oxygen above 90. prn tessalon pearls given for cough. CT continues on -20 sucction. No s/s of distress noted this shift. pt compliant with plan of care.

## 2024-05-20 NOTE — PROGRESS NOTES
Daily Progress Note  [address]  9/23/2023    Patient Name:  Che Landaverde  MRN:  [id number]   YOB: 1950  Age: 73 y.o.  Sex: female  LOS: 4    Reason for admission:  Pneumothorax, COPD    Interval History:  No acute events overnight  Daughter is at bedside  Breathing is improved  Continues to have some cough and weakness  No chest pain or palpitations  No nausea or vomiting  Inquisitive on CT chest results    Physical Exam:  Vitals:    05/20/24 1520   BP: 119/63   Pulse: 92   Resp:    Temp: 98.3 °F (36.8 °C)   SpO2:        Intake/Output    Intake/Output Summary (Last 24 hours) at 5/20/2024 1817  Last data filed at 5/20/2024 1815  Gross per 24 hour   Intake 720 ml   Output 30 ml   Net 690 ml     General: Alert, nontoxic, NAD  HEENT: NC/AT, EOMI, MMM  Neck: Supple, trachea midline  Cardiac: RRR, no murmur, gallops, rubs  Chest: Chest tube in place  Pulmonary: Coarse bilaterally  GI: Soft, non-tender, non-distended, normal bowel sounds  Extremities: Warm, well perfused, no LE edema  Skin: no visible rash  Neuro: CN II - XII grossly intact  Psychiatry: Normal mood and affect      Data Review:  Results from last 7 days   Lab Units 05/20/24  0426 05/16/24  1755   WBC 10*3/mm3 16.10* 17.53*   HEMOGLOBIN g/dL 10.4* 10.8*   PLATELETS 10*3/mm3 661* 461*     Results from last 7 days   Lab Units 05/20/24  0426 05/16/24  1756   SODIUM mmol/L 135* 134*   POTASSIUM mmol/L 3.9 4.3   CHLORIDE mmol/L 94* 96*   CO2 mmol/L 30.0* 24.0   BUN mg/dL 9 9   CREATININE mg/dL 0.54* 0.62   GLUCOSE mg/dL 100* 182*   CALCIUM mg/dL 8.8 8.9   Estimated Creatinine Clearance: 103.7 mL/min (A) (by C-G formula based on SCr of 0.54 mg/dL (L)).    Results from last 7 days   Lab Units 05/20/24  0426 05/18/24  2331 05/16/24  1756 05/16/24  1755   PROCALCITONIN ng/mL  --   --  <0.02  --    D DIMER QUANT MCGFEU/mL  --  2.80*  --   --    PLATELETS 10*3/mm3 661*  --   --  461*       Results from last 7 days   Lab Units  05/18/24  2338   PH, ARTERIAL pH units 7.384   PCO2, ARTERIAL mm Hg 48.0*   PO2 ART mm Hg 79.6*   HCO3 ART mmol/L 28.6*         Imaging:  Reviewed chest images personally from past 3 days    ASSESSMENT  /  PLAN:    Iatrogenic left pneumothorax  Bilateral pulmonary infiltrates/nodules, s/p bronch with TBB and BAL 5/16/2024.  Path normal  Rhinovirus infection  COPD exacerbation due to viral illness  Acute hypoxic respiratory failure  Stress-induced leukocytosis    -Respiratory status is slowly improving, wean oxygen as tolerated for SpO2 goal greater than 90.  -Daily chest x-ray, chest tube in place, thoracic surgery following, appreciate their recommendations.  -Continue bronchodilator therapy with Symbicort and Spiriva.  -Albuterol nebulizer as needed.  -CT chest without any pulmonary embolism however nodular opacities bilaterally, this may be in the setting of rhinovirus infection, will hold off on antibiotics at this time, if respiratory status worsens will treat empirically for bacterial pneumonia.  -Patient will need repeat imaging in 6 to 8 weeks after discharge and follow-up with Dr. Edwards to ensure resolution of pneumonia.    All issues new to me today.  Prior hospital course, labs and imaging reviewed.    Disposition: Floor.  Discharge pending resolution of pneumothorax.    Jaylon Frazier MD  [facility]  Pulmonary and Critical Care Medicine, Interventional Pulmonology    Parts of this note may be an electronic transcription/translation of spoken language to printed text using the Dragon dictation system.

## 2024-05-21 ENCOUNTER — APPOINTMENT (OUTPATIENT)
Dept: GENERAL RADIOLOGY | Facility: HOSPITAL | Age: 74
End: 2024-05-21
Payer: MEDICARE

## 2024-05-21 ENCOUNTER — TELEPHONE (OUTPATIENT)
Dept: RADIOLOGY | Facility: HOSPITAL | Age: 74
End: 2024-05-21
Payer: MEDICARE

## 2024-05-21 LAB
ANION GAP SERPL CALCULATED.3IONS-SCNC: 10 MMOL/L (ref 5–15)
BASOPHILS # BLD AUTO: 0.05 10*3/MM3 (ref 0–0.2)
BASOPHILS NFR BLD AUTO: 0.3 % (ref 0–1.5)
BUN SERPL-MCNC: 8 MG/DL (ref 8–23)
BUN/CREAT SERPL: 12.9 (ref 7–25)
CALCIUM SPEC-SCNC: 9.3 MG/DL (ref 8.6–10.5)
CHLORIDE SERPL-SCNC: 94 MMOL/L (ref 98–107)
CO2 SERPL-SCNC: 27 MMOL/L (ref 22–29)
CREAT SERPL-MCNC: 0.62 MG/DL (ref 0.57–1)
DEPRECATED RDW RBC AUTO: 42.1 FL (ref 37–54)
EGFRCR SERPLBLD CKD-EPI 2021: 93 ML/MIN/1.73
EOSINOPHIL # BLD AUTO: 0.25 10*3/MM3 (ref 0–0.4)
EOSINOPHIL NFR BLD AUTO: 1.4 % (ref 0.3–6.2)
ERYTHROCYTE [DISTWIDTH] IN BLOOD BY AUTOMATED COUNT: 13.5 % (ref 12.3–15.4)
GLUCOSE SERPL-MCNC: 128 MG/DL (ref 65–99)
HCT VFR BLD AUTO: 32.2 % (ref 34–46.6)
HGB BLD-MCNC: 10.4 G/DL (ref 12–15.9)
IMM GRANULOCYTES # BLD AUTO: 0.23 10*3/MM3 (ref 0–0.05)
IMM GRANULOCYTES NFR BLD AUTO: 1.3 % (ref 0–0.5)
INTERPRETATION: NEGATIVE
LYMPHOCYTES # BLD AUTO: 1.9 10*3/MM3 (ref 0.7–3.1)
LYMPHOCYTES NFR BLD AUTO: 10.6 % (ref 19.6–45.3)
MAGNESIUM SERPL-MCNC: 2 MG/DL (ref 1.6–2.4)
MCH RBC QN AUTO: 28.1 PG (ref 26.6–33)
MCHC RBC AUTO-ENTMCNC: 32.3 G/DL (ref 31.5–35.7)
MCV RBC AUTO: 87 FL (ref 79–97)
MONOCYTES # BLD AUTO: 1.87 10*3/MM3 (ref 0.1–0.9)
MONOCYTES NFR BLD AUTO: 10.4 % (ref 5–12)
MRSA DNA SPEC QL NAA+PROBE: NORMAL
NEUTROPHILS NFR BLD AUTO: 13.64 10*3/MM3 (ref 1.7–7)
NEUTROPHILS NFR BLD AUTO: 76 % (ref 42.7–76)
NRBC BLD AUTO-RTO: 0 /100 WBC (ref 0–0.2)
PHOSPHATE SERPL-MCNC: 3.2 MG/DL (ref 2.5–4.5)
PLATELET # BLD AUTO: 750 10*3/MM3 (ref 140–450)
PMV BLD AUTO: 8.9 FL (ref 6–12)
POTASSIUM SERPL-SCNC: 4.5 MMOL/L (ref 3.5–5.2)
PROCALCITONIN SERPL-MCNC: 0.1 NG/ML (ref 0–0.25)
RBC # BLD AUTO: 3.7 10*6/MM3 (ref 3.77–5.28)
RESULT: NORMAL NG/ML
SODIUM SERPL-SCNC: 131 MMOL/L (ref 136–145)
SPECIMEN SOURCE: NORMAL
WBC NRBC COR # BLD AUTO: 17.94 10*3/MM3 (ref 3.4–10.8)

## 2024-05-21 PROCEDURE — 94799 UNLISTED PULMONARY SVC/PX: CPT

## 2024-05-21 PROCEDURE — 84100 ASSAY OF PHOSPHORUS: CPT | Performed by: HOSPITALIST

## 2024-05-21 PROCEDURE — 97530 THERAPEUTIC ACTIVITIES: CPT

## 2024-05-21 PROCEDURE — 94760 N-INVAS EAR/PLS OXIMETRY 1: CPT

## 2024-05-21 PROCEDURE — 94664 DEMO&/EVAL PT USE INHALER: CPT

## 2024-05-21 PROCEDURE — 97535 SELF CARE MNGMENT TRAINING: CPT

## 2024-05-21 PROCEDURE — 99232 SBSQ HOSP IP/OBS MODERATE 35: CPT

## 2024-05-21 PROCEDURE — 71045 X-RAY EXAM CHEST 1 VIEW: CPT

## 2024-05-21 PROCEDURE — 97166 OT EVAL MOD COMPLEX 45 MIN: CPT

## 2024-05-21 PROCEDURE — 85025 COMPLETE CBC W/AUTO DIFF WBC: CPT | Performed by: HOSPITALIST

## 2024-05-21 PROCEDURE — 25010000002 ONDANSETRON PER 1 MG: Performed by: INTERNAL MEDICINE

## 2024-05-21 PROCEDURE — 80048 BASIC METABOLIC PNL TOTAL CA: CPT | Performed by: HOSPITALIST

## 2024-05-21 PROCEDURE — 25010000002 PIPERACILLIN SOD-TAZOBACTAM PER 1 G: Performed by: HOSPITALIST

## 2024-05-21 PROCEDURE — 83735 ASSAY OF MAGNESIUM: CPT | Performed by: HOSPITALIST

## 2024-05-21 PROCEDURE — 97161 PT EVAL LOW COMPLEX 20 MIN: CPT

## 2024-05-21 PROCEDURE — 94761 N-INVAS EAR/PLS OXIMETRY MLT: CPT

## 2024-05-21 PROCEDURE — 84145 PROCALCITONIN (PCT): CPT | Performed by: HOSPITALIST

## 2024-05-21 PROCEDURE — 87641 MR-STAPH DNA AMP PROBE: CPT | Performed by: HOSPITALIST

## 2024-05-21 RX ORDER — GUAIFENESIN 600 MG/1
1200 TABLET, EXTENDED RELEASE ORAL EVERY 12 HOURS SCHEDULED
Status: DISCONTINUED | OUTPATIENT
Start: 2024-05-21 | End: 2024-05-23 | Stop reason: HOSPADM

## 2024-05-21 RX ORDER — IPRATROPIUM BROMIDE AND ALBUTEROL SULFATE 2.5; .5 MG/3ML; MG/3ML
3 SOLUTION RESPIRATORY (INHALATION)
Status: DISCONTINUED | OUTPATIENT
Start: 2024-05-21 | End: 2024-05-23 | Stop reason: HOSPADM

## 2024-05-21 RX ADMIN — IPRATROPIUM BROMIDE AND ALBUTEROL SULFATE 3 ML: .5; 3 SOLUTION RESPIRATORY (INHALATION) at 15:07

## 2024-05-21 RX ADMIN — PIPERACILLIN AND TAZOBACTAM 3.38 G: 3; .375 INJECTION, POWDER, FOR SOLUTION INTRAVENOUS at 15:29

## 2024-05-21 RX ADMIN — BUDESONIDE AND FORMOTEROL FUMARATE DIHYDRATE 2 PUFF: 160; 4.5 AEROSOL RESPIRATORY (INHALATION) at 06:48

## 2024-05-21 RX ADMIN — BENZONATATE 200 MG: 100 CAPSULE ORAL at 06:20

## 2024-05-21 RX ADMIN — GUAIFENESIN 1200 MG: 600 TABLET, EXTENDED RELEASE ORAL at 10:18

## 2024-05-21 RX ADMIN — CITALOPRAM 20 MG: 20 TABLET, FILM COATED ORAL at 08:09

## 2024-05-21 RX ADMIN — IPRATROPIUM BROMIDE AND ALBUTEROL SULFATE 3 ML: .5; 3 SOLUTION RESPIRATORY (INHALATION) at 10:58

## 2024-05-21 RX ADMIN — Medication 10 ML: at 08:10

## 2024-05-21 RX ADMIN — TIOTROPIUM BROMIDE INHALATION SPRAY 2 PUFF: 3.12 SPRAY, METERED RESPIRATORY (INHALATION) at 06:49

## 2024-05-21 RX ADMIN — METOPROLOL TARTRATE 25 MG: 25 TABLET, FILM COATED ORAL at 20:17

## 2024-05-21 RX ADMIN — ACETAMINOPHEN 650 MG: 325 TABLET, FILM COATED ORAL at 23:28

## 2024-05-21 RX ADMIN — PANTOPRAZOLE SODIUM 40 MG: 40 TABLET, DELAYED RELEASE ORAL at 06:20

## 2024-05-21 RX ADMIN — CILOSTAZOL 50 MG: 100 TABLET ORAL at 08:09

## 2024-05-21 RX ADMIN — CETIRIZINE HYDROCHLORIDE 10 MG: 10 TABLET ORAL at 08:10

## 2024-05-21 RX ADMIN — SPIRONOLACTONE 100 MG: 100 TABLET ORAL at 08:10

## 2024-05-21 RX ADMIN — CILOSTAZOL 50 MG: 100 TABLET ORAL at 20:16

## 2024-05-21 RX ADMIN — GUAIFENESIN 1200 MG: 600 TABLET, EXTENDED RELEASE ORAL at 20:16

## 2024-05-21 RX ADMIN — BENZONATATE 200 MG: 100 CAPSULE ORAL at 20:16

## 2024-05-21 RX ADMIN — ROSUVASTATIN CALCIUM 10 MG: 10 TABLET, FILM COATED ORAL at 08:09

## 2024-05-21 RX ADMIN — IPRATROPIUM BROMIDE AND ALBUTEROL SULFATE 3 ML: .5; 3 SOLUTION RESPIRATORY (INHALATION) at 18:48

## 2024-05-21 RX ADMIN — BUDESONIDE AND FORMOTEROL FUMARATE DIHYDRATE 2 PUFF: 160; 4.5 AEROSOL RESPIRATORY (INHALATION) at 18:48

## 2024-05-21 RX ADMIN — Medication 10 ML: at 20:18

## 2024-05-21 RX ADMIN — MONTELUKAST SODIUM 10 MG: 10 TABLET, FILM COATED ORAL at 08:09

## 2024-05-21 RX ADMIN — ONDANSETRON 4 MG: 2 INJECTION INTRAMUSCULAR; INTRAVENOUS at 08:12

## 2024-05-21 RX ADMIN — PIPERACILLIN AND TAZOBACTAM 3.38 G: 3; .375 INJECTION, POWDER, FOR SOLUTION INTRAVENOUS at 10:18

## 2024-05-21 RX ADMIN — METOPROLOL TARTRATE 25 MG: 25 TABLET, FILM COATED ORAL at 08:10

## 2024-05-21 RX ADMIN — AMITRIPTYLINE HYDROCHLORIDE 20 MG: 10 TABLET, FILM COATED ORAL at 20:16

## 2024-05-21 NOTE — CASE MANAGEMENT/SOCIAL WORK
Continued Stay Note  [facility]     Patient Name: Che Landaverde  MRN: [id number]  Today's Date: 5/21/2024    Admit Date: 5/16/2024    Plan: Home with HH   Discharge Plan       Row Name 05/21/24 0826       Plan    Plan Home with HH    Patient/Family in Agreement with Plan yes    Plan Comments Met with pt in room. Discussed HH and DME. She requested referrals be entered for [facility] for D[facility] and [facility] and [facility]. She needs oxygen and would like to me to inquire about a rollator. Referrals entered. Pt drops to 83% on room air at rest. Pt requires 4.5L O2 to maintain 90% O2. CCP to follow. RICARDO Ramirez RN                   Discharge Codes    No documentation.                 Expected Discharge Date and Time       Expected Discharge Date Expected Discharge Time    May 19, 2024               Federico Nuñez, MARISSA     PAST MEDICAL HISTORY:  Chronic streptococcal tonsillitis see hpi    Dysplastic nevi excised 2017    Leukocytoclastic vasculitis 2015    Psoriasis worsened by strep

## 2024-05-21 NOTE — PROGRESS NOTES
[facility] Clinical Pharmacy Services: Piperacillin-Tazobactam Consult    Pt Name: Che Landaverde   : 1950    Indication: Pneumonia    Relevant clinical data and objective history reviewed:    Past Medical History:   Diagnosis Date    Aggressive former smoker     Allergic rhinitis     Anxiety     Asthma     Atherosclerotic PVD with intermittent claudication 2022    Benign essential hypertension     Chronic obstructive pulmonary disease, unspecified COPD type 2022    Cough     Diabetes mellitus     Diarrhea     Encounter for screening for lung cancer     GERD (gastroesophageal reflux disease)     Hip arthrosis     Internal hemorrhoids     Laceration of skin of lower leg     Lung nodule     Microscopic colitis     Migraine, unspecified, not intractable, without status migrainosus     Need for influenza vaccination     Need for pneumococcal vaccination     Osteoporosis     Painful hip     Pleural effusion 2020    Reactive airway disease with wheezing with acute exacerbation     Seasonal allergies     Sleep disturbances     Smoking history     Varicose veins of bilateral lower extremities with other complications 2022    Venous insufficiency (chronic) (peripheral) 2022    Venous ulcer 2022     Creatinine   Date Value Ref Range Status   2024 0.54 (L) 0.57 - 1.00 mg/dL Final   2024 0.62 0.57 - 1.00 mg/dL Final   2024 0.77 0.57 - 1.00 mg/dL Final   2023 0.95 0.57 - 1.00 mg/dL Final   2023 0.88 0.57 - 1.00 mg/dL Final   2022 0.78 0.57 - 1.00 mg/dL Final     BUN   Date Value Ref Range Status   2024 9 8 - 23 mg/dL Final     Estimated Creatinine Clearance: 103.7 mL/min (A) (by C-G formula based on SCr of 0.54 mg/dL (L)).    Lab Results   Component Value Date    WBC 16.10 (H) 2024     Temp Readings from Last 3 Encounters:   24 97.8 °F (36.6 °C) (Oral)   23 97.7 °F (36.5 °C) (Temporal)   22 98.3 °F (36.8 °C)       Assessment/Plan  Estimated CrCl >20 mL/min at this time; BMI 25 kg/m2  Will start piperacillin-tazobactam 3.375 g IV every 8 hours     Pharmacy will continue to follow daily while on piperacillin-tazobactam and adjust as needed. Thank you for this consult.    Isaac Dubose, [facility]  Clinical Pharmacist

## 2024-05-21 NOTE — DISCHARGE PLACEMENT REQUEST
"Niki Landaverde (73 y.o. Female)       Date of Birth   1950    Social Security Number       Address   [address] [place] [address]    Home Phone   [phone]    MRN   [id number]       Judaism   [facility]    Marital Status   Single                            Admission Date   5/16/24    Admission Type   Elective    Admitting Provider   Ramon Edwards MD    Attending Provider   Ramon Edwards MD    Department, Room/Bed   [address], [address]/1       Discharge Date       Discharge Disposition       Discharge Destination                                 Attending Provider: Ramon Edwards MD    Allergies: Gluten Meal    Isolation: Droplet   Infection: Rhinovirus  (05/16/24)   Code Status: CPR    Ht: 170.2 cm (67\")   Wt: 73 kg (161 lb)    Admission Cmt: None   Principal Problem: Pneumothorax [J93.9]                   Active Insurance as of 5/16/2024       Primary Coverage       Payor Plan Insurance Group Employer/Plan Group    ANTHEM MEDICARE REPLACEMENT ANTHEM MEDICARE ADVANTAGE [id number]       Payor Plan Address Payor Plan Phone Number Payor Plan Fax Number Effective Dates    [address] [phone]  1/1/2024 - None Entered    [facility] [id number]         Subscriber Name Subscriber Birth Date Member ID       NIKI LANDAVERDE 1950 [id number]                     Emergency Contacts        (Rel.) Home Phone Work Phone Mobile Phone    Rosa Lindsay (Relative) -- -- [phone]    Malia(NEPHEW,Ray (Relative) [phone] -- [phone]    Malia(NIECE)Guillermina (Relative) [phone] -- [phone]              Emergency Contact Information       Name Relation Home Work Mobile    Rosa Lindsay Relative   [phone]    Malia(NEPHEW,Ray Relative [phone]    Malia(NIECE),Guillermina Relative [phone]          "

## 2024-05-21 NOTE — PLAN OF CARE
Goal Outcome Evaluation:   VSS. A&OX4. 02@4lpm. Pain managed with PRN pain medication. Valium given. Up with assist x1. Chest tube to water seal. Pt states not using IS, educated on importance of use, pt states will try to use today. Call light in reach.     0630- Pt up to [facility], had BM and coughing spell. Difficulty recovering sats afterwards. Increased 02 to 6lpm HFNC.

## 2024-05-21 NOTE — THERAPY EVALUATION
Patient Name: Che Landaverde  : 1950    MRN: [id number]                              Today's Date: 2024       Admit Date: 2024    Visit Dx:     ICD-10-CM ICD-9-CM   1. Pneumonia  J18.9 486     Patient Active Problem List   Diagnosis    Gastroesophageal reflux disease    Hoarseness    Essential hypertension    Migraine    Dupuytren's contracture    Mixed simple and mucopurulent chronic bronchitis    Type 2 diabetes mellitus with hyperglycemia, without long-term current use of insulin    Chronic left-sided low back pain without sciatica    Seasonal allergies    PAD (peripheral artery disease)    Celiac disease    Pneumothorax     Past Medical History:   Diagnosis Date    Aggressive former smoker     Allergic rhinitis     Anxiety     Asthma     Atherosclerotic PVD with intermittent claudication 2022    Benign essential hypertension     Chronic obstructive pulmonary disease, unspecified COPD type 2022    Cough     Diabetes mellitus     Diarrhea     Encounter for screening for lung cancer     GERD (gastroesophageal reflux disease)     Hip arthrosis     Internal hemorrhoids     Laceration of skin of lower leg     Lung nodule     Microscopic colitis     Migraine, unspecified, not intractable, without status migrainosus     Need for influenza vaccination     Need for pneumococcal vaccination     Osteoporosis     Painful hip     Pleural effusion 2020    Reactive airway disease with wheezing with acute exacerbation     Seasonal allergies     Sleep disturbances     Smoking history     Varicose veins of bilateral lower extremities with other complications 2022    Venous insufficiency (chronic) (peripheral) 2022    Venous ulcer 2022     Past Surgical History:   Procedure Laterality Date    BRAVO PROCEDURE N/A 2016    Procedure: ESOPHAGOGASTRODUODENOSCOPY AND BRAVO ;  Surgeon: Laila Trevino MD;  Location: [facility] ENDOSCOPY;  Service:     BRONCHOSCOPY N/A 2024     Procedure: BRONCHOSCOPY WITH FLUORO with BAL and biopsy;  Surgeon: Ramon Edwards MD;  Location: [facility] ENDOSCOPY;  Service: Robotics - Pulmonary;  Laterality: N/A;  pre: pneumonia  post: same    CATARACT EXTRACTION  07/13/2015    COLONOSCOPY  03/06/2015    ih 3/15 Dr. Trevino    SIGMOIDOSCOPY N/A 06/07/2016    Procedure: SIGMOIDOSCOPY FLEXIBLE to transverse colon with biopsy;  Surgeon: Laila Trevino MD;  Location: [facility] ENDOSCOPY;  Service:       General Information       Row Name 05/21/24 1121          Physical Therapy Time and Intention    Document Type evaluation  Pt. admitted with a Pneumothorax; s/p Bronchoscopy  -MS     Mode of Treatment physical therapy  -MS       Row Name 05/21/24 1121          General Information    Patient Profile Reviewed yes  -MS     Prior Level of Function independent:  -MS     Existing Precautions/Restrictions oxygen therapy device and L/min;fall   Chest tube x 1  -MS     Barriers to Rehab none identified  -MS       Row Name 05/21/24 1121          Cognition    Orientation Status (Cognition) oriented x 3  -MS       Row Name 05/21/24 1121          Safety Issues, Functional Mobility    Comment, Safety Issues/Impairments (Mobility) Gait belt used for safety.  -MS               User Key  (r) = Recorded By, (t) = Taken By, (c) = Cosigned By      Initials Name Provider Type    MS CallahanPeter noble, PT Physical Therapist                   Mobility       Row Name 05/21/24 1122          Bed Mobility    Bed Mobility supine-sit;sit-supine  -MS     Comment, (Bed Mobility) Pt. up in chair this AM.  -MS       Row Name 05/21/24 1122          Sit-Stand Transfer    Sit-Stand [place] (Transfers) contact guard  -MS     Assistive Device (Sit-Stand Transfers) walker, front-wheeled  -MS       Row Name 05/21/24 1122          Gait/Stairs (Locomotion)    [place] Level (Gait) contact guard  -MS     Assistive Device (Gait) walker, front-wheeled  -MS     Distance in Feet (Gait) 30  -MS      Deviations/Abnormal Patterns (Gait) mary decreased  -MS     Bilateral Gait Deviations forward flexed posture  -MS               User Key  (r) = Recorded By, (t) = Taken By, (c) = Cosigned By      Initials Name Provider Type    Peter Benitez PATRICK, PT Physical Therapist                   Obj/Interventions       Row Name 05/21/24 1123          Range of Motion Comprehensive    Comment, General Range of Motion BUE/LE (WFL's)  -MS       Row Name 05/21/24 1123          Strength Comprehensive (MMT)    Comment, General Manual Muscle Testing (MMT) Assessment BUE/LE (3+/5)  -MS       Row Name 05/21/24 1123          Motor Skills    Therapeutic Exercise --  BLE ther. ex. program x 10 reps completed (Hip Flexion, LAQ's)  -MS               User Key  (r) = Recorded By, (t) = Taken By, (c) = Cosigned By      Initials Name Provider Type    Peter Benitez PATRICK, PT Physical Therapist                   Goals/Plan       Row Name 05/21/24 1124          Bed Mobility Goal 1 (PT)    Activity/Assistive Device (Bed Mobility Goal 1, PT) bed mobility activities, all  -MS     [place] Level/Cues Needed (Bed Mobility Goal 1, PT) independent  -MS     Time Frame (Bed Mobility Goal 1, PT) long term goal (LTG);1 week  -MS       [facility] Name 05/21/24 1124          Transfer Goal 1 (PT)    Activity/Assistive Device (Transfer Goal 1, PT) transfers, all  -MS     [place] Level/Cues Needed (Transfer Goal 1, PT) independent  -MS     Time Frame (Transfer Goal 1, PT) long term goal (LTG);1 week  -MS       Row Name 05/21/24 1124          Gait Training Goal 1 (PT)    Activity/Assistive Device (Gait Training Goal 1, PT) gait (walking locomotion)  With or without AAD  -MS     [place] Level (Gait Training Goal 1, PT) independent  -MS     Distance (Gait Training Goal 1, PT) 100 feet  -MS     Time Frame (Gait Training Goal 1, PT) long term goal (LTG);1 week  -MS       [facility] Name 05/21/24 1124          Therapy Assessment/Plan (PT)    Planned Therapy  Interventions (PT) balance training;bed mobility training;gait training;home exercise program;patient/family education;postural re-education;transfer training;strengthening  -MS               User Key  (r) = Recorded By, (t) = Taken By, (c) = Cosigned By      Initials Name Provider Type    Peter Benitez, PT Physical Therapist                   Clinical Impression       Row Name 05/21/24 1123          Pain    Pretreatment Pain Rating 3/10  -MS     Posttreatment Pain Rating 3/10  -MS     Pain Location generalized  -MS       Row Name 05/21/24 1123          Plan of Care Review    Plan of Care Reviewed With patient  -MS       Row Name 05/21/24 1123          Therapy Assessment/Plan (PT)    Rehab Potential (PT) good, to achieve stated therapy goals  -MS     Criteria for Skilled Interventions Met (PT) skilled treatment is necessary  -MS     Therapy Frequency (PT) 6 times/wk  -MS       Row Name 05/21/24 1123          Positioning and Restraints    Pre-Treatment Position sitting in chair/recliner  -MS     Post Treatment Position chair  -MS     In Chair notified nsg;sitting;call light within reach;encouraged to call for assist;exit alarm on  All lines/tubes intact.  -MS               User Key  (r) = Recorded By, (t) = Taken By, (c) = Cosigned By      Initials Name Provider Type    Peter Benitez, PT Physical Therapist                   Outcome Measures       Row Name 05/21/24 1124 05/21/24 0810       How much help from another person do you currently need...    Turning from your back to your side while in flat bed without using bedrails? 3  -MS 3  -SS    Moving from lying on back to sitting on the side of a flat bed without bedrails? 3  -MS 3  -SS    Moving to and from a bed to a chair (including a wheelchair)? 3  -MS 3  -SS    Standing up from a chair using your arms (e.g., wheelchair, bedside chair)? 3  -MS 3  -SS    Climbing 3-5 steps with a railing? 3  -MS 3  -SS    To walk in hospital room? 3  -MS 3  -SS     [facility] 6 Clicks Score (PT) 18  -MS 18  -SS    Highest Level of Mobility Goal 6 --> Walk 10 steps or more  -MS 6 --> Walk 10 steps or more  -SS      Row Name 05/21/24 1124          Functional Assessment    Outcome Measure Options -[facility] 6 Clicks Basic Mobility (PT)  -MS               User Key  (r) = Recorded By, (t) = Taken By, (c) = Cosigned By      Initials Name Provider Type    MS Peter Callahan, PT Physical Therapist    Breanna Pelayo RN Registered Nurse                                 Physical Therapy Education       Title: PT OT SLP Therapies (Done)       Topic: Physical Therapy (Done)       Point: Mobility training (Done)       Learning Progress Summary             Patient Acceptance, E,D, VU,NR by MS at 5/21/2024 1125                         Point: Home exercise program (Done)       Learning Progress Summary             Patient Acceptance, E,D, VU,NR by MS at 5/21/2024 1125                         Point: Body mechanics (Done)       Learning Progress Summary             Patient Acceptance, E,D, VU,NR by MS at 5/21/2024 1125                         Point: Precautions (Done)       Learning Progress Summary             Patient Acceptance, E,D, VU,NR by MS at 5/21/2024 1125                                         User Key       Initials Effective Dates Name Provider Type Discipline    MS 06/16/21 -  Peter Callahan, PT Physical Therapist PT                  PT Recommendation and Plan  Planned Therapy Interventions (PT): balance training, bed mobility training, gait training, home exercise program, patient/family education, postural re-education, transfer training, strengthening  Plan of Care Reviewed With: patient  Outcome Evaluation: Pt. is a 73 year old Female admitted with a Pneumothorax and is s/p Bronchoscopy.  Pt. reports that prior to admission she was independent with functional mobility and used no A.D. for ambulation.  Pt. currently presents with decreased strength, decreased balance, and  decreased tolerance to functional activity. This AM, pt. able to ambulate 30 feet, CGA x 1, with use of Rwx.  Pt. requires CGA x 1 for sit <-> stand transfers.  BLE ther. ex. program x 10 reps completed for general strengthening.  Pt. will benefit from skilled inpt. P.T. to address her functional deficits and to assist pt. in regaining her maximum level of independence with functional mobility.     Time Calculation:         PT Charges       Row Name 05/21/24 1131             Time Calculation    Start Time 1028  -MS      Stop Time 1042  -MS      Time Calculation (min) 14 min  -MS      PT Received On 05/21/24  -MS      PT - Next Appointment 05/22/24  -MS      PT Goal Re-Cert Due Date 05/28/24  -MS         Time Calculation- PT    Total Timed Code Minutes- PT 13 minute(s)  -MS                User Key  (r) = Recorded By, (t) = Taken By, (c) = Cosigned By      Initials Name Provider Type    Peter Benitez, PT Physical Therapist                  Therapy Charges for Today       Code Description Service Date Service Provider Modifiers Qty    [id number]  PT EVAL LOW COMPLEXITY 2 5/21/2024 Peter Callahan, PT GP 1    [id number] HC PT THERAPEUTIC ACT EA 15 MIN 5/21/2024 Peter Callahan, PT GP 1            PT G-Codes  Outcome Measure Options: AM-PAC 6 Clicks Basic Mobility (PT)  AM-PAC 6 Clicks Score (PT): 18  PT Discharge Summary  Anticipated Discharge Disposition (PT): home with assist, home with home health    Peter Callahan, PT  5/21/2024

## 2024-05-21 NOTE — PROGRESS NOTES
Daily Progress Note  [address]  9/23/2023    Patient Name:  Che Landaverde  MRN:  [id number]   YOB: 1950  Age: 73 y.o.  Sex: female  LOS: 5    Reason for admission:  Pneumothorax, COPD    Interval History:  No acute events overnight  Breathing is stable, cough is improved, does have some sputum  No fevers or chills  No chest pain or palpitations  No nausea or vomiting  Chest tube was clamped this morning  Briefly on 7 L nasal cannula when she was ambulating however brought down to 3 L as she sits in the chair    Physical Exam:  Vitals:    05/21/24 0700   BP: 124/62   Pulse: 103   Resp: 16   Temp: 97.8 °F (36.6 °C)   SpO2: 91%       Intake/Output    Intake/Output Summary (Last 24 hours) at 5/21/2024 0939  Last data filed at 5/21/2024 0620  Gross per 24 hour   Intake 240 ml   Output 5 ml   Net 235 ml     General: Alert, nontoxic, NAD  HEENT: NC/AT, EOMI, MMM  Neck: Supple, trachea midline  Cardiac: RRR, no murmur, gallops, rubs  Chest: Chest tube in place  Pulmonary: Coarse bilaterally  GI: Soft, non-tender, non-distended, normal bowel sounds  Extremities: Warm, well perfused, no LE edema  Skin: no visible rash  Neuro: CN II - XII grossly intact  Psychiatry: Normal mood and affect    Data Review:  Results from last 7 days   Lab Units 05/20/24  0426 05/16/24  1755   WBC 10*3/mm3 16.10* 17.53*   HEMOGLOBIN g/dL 10.4* 10.8*   PLATELETS 10*3/mm3 661* 461*     Results from last 7 days   Lab Units 05/20/24  0426 05/16/24  1756   SODIUM mmol/L 135* 134*   POTASSIUM mmol/L 3.9 4.3   CHLORIDE mmol/L 94* 96*   CO2 mmol/L 30.0* 24.0   BUN mg/dL 9 9   CREATININE mg/dL 0.54* 0.62   GLUCOSE mg/dL 100* 182*   CALCIUM mg/dL 8.8 8.9   Estimated Creatinine Clearance: 103.7 mL/min (A) (by C-G formula based on SCr of 0.54 mg/dL (L)).    Results from last 7 days   Lab Units 05/20/24  0426 05/18/24  2331 05/16/24  1756 05/16/24  1755   PROCALCITONIN ng/mL  --   --  <0.02  --    D DIMER QUANT  MCGFEU/mL  --  2.80*  --   --    PLATELETS 10*3/mm3 661*  --   --  461*       Results from last 7 days   Lab Units 05/18/24  2338   PH, ARTERIAL pH units 7.384   PCO2, ARTERIAL mm Hg 48.0*   PO2 ART mm Hg 79.6*   HCO3 ART mmol/L 28.6*         Imaging:  Reviewed chest images personally from past 3 days    ASSESSMENT  /  PLAN:    Iatrogenic left pneumothorax  Bilateral pulmonary infiltrates/nodules, s/p bronch with TBB and BAL 5/16/2024.  Path normal  Rhinovirus infection  COPD exacerbation due to viral illness  Acute hypoxic respiratory failure  Stress-induced leukocytosis    -Respiratory status is stable wean oxygen as tolerated for SpO2 goal greater than 90%.  Weaned from 3 L to 1 L with appropriate saturations.  Will need desaturation screen prior to discharge.  -Clinically the patient does state that her breathing is improved as his cough however continues to have significant oxygen requirements and CT chest demonstrated significant nodular opacities which do not appear to be congruent with a rhinovirus infection.  Will start her on empiric antibiotics and treat her for course of possible bacterial pneumonia.  -Daily chest x-ray, chest tube in place and clamped, tentative plan for removal tomorrow.  Thoracic surgery following.  -Continue bronchodilator therapy with Symbicort and Spiriva.  -Albuterol nebulizer as needed.  -PT recommends home with home health  -Patient will need repeat imaging in 6 to 8 weeks after discharge and follow-up with Dr. Edwards to ensure resolution of pneumonia.    Disposition: Floor.  Discharge pending resolution of pneumothorax.    Jaylon Frazier MD  [facility]  Pulmonary and Critical Care Medicine, Interventional Pulmonology    Parts of this note may be an electronic transcription/translation of spoken language to printed text using the Dragon dictation system.

## 2024-05-21 NOTE — PLAN OF CARE
Problem: Skin Injury Risk Increased  Goal: Skin Health and Integrity  Outcome: Ongoing, Progressing     Problem: Fall Injury Risk  Goal: Absence of Fall and Fall-Related Injury  Outcome: Ongoing, Progressing  Intervention: Promote Injury-Free Environment  Recent Flowsheet Documentation  Taken 5/21/2024 1212 by Breanna Moreau, RN  Safety Promotion/Fall Prevention:   activity supervised   clutter free environment maintained   assistive device/personal items within reach   fall prevention program maintained   nonskid shoes/slippers when out of bed   room organization consistent   safety round/check completed   toileting scheduled  Taken 5/21/2024 1000 by Breanna Moreau, RN  Safety Promotion/Fall Prevention:   activity supervised   clutter free environment maintained   assistive device/personal items within reach   fall prevention program maintained   nonskid shoes/slippers when out of bed   room organization consistent   safety round/check completed   toileting scheduled   Goal Outcome Evaluation:  Plan of Care Reviewed With: patient        Progress: improving  Outcome Evaluation: VSS, weaning oxygen as tolerated. CT clamped. Up with assist. No c/o pain. IS use encouraged. Call light within reach.

## 2024-05-21 NOTE — PLAN OF CARE
Goal Outcome Evaluation:  Plan of Care Reviewed With: patient           Outcome Evaluation: Pt is a 74 y/o F admitted with pneumothorax, PNA, COPD exacerbation, s/p bronch and L chest tube placement. Pt typically lives alone and is indep with ADLs and fxl mobility w/o AD but has a walker available. She was found sitting UIC, on 3L O2 via NC. CGA for STS and fxl ambulation to the sink using RW. Assist for line mgt of CT, IV pole and O2. She stands multiple mins to complete grooming tasks with set-up/spv. SpO2 monitored and is w/in 88-91% t/o ax. Returns to chair and left wth all needs in reach. Demo's dec endurance and act tolerance from baseline. Pt plans to d/c home with family staying with her and assisting as needed. Will also benefit from HH OT.      Anticipated Discharge Disposition (OT): home with assist, home with home health

## 2024-05-21 NOTE — PLAN OF CARE
Goal Outcome Evaluation:  Plan of Care Reviewed With: patient           Outcome Evaluation: Pt. is a 73 year old Female admitted with a Pneumothorax and is s/p Bronchoscopy.  Pt. reports that prior to admission she was independent with functional mobility and used no A.D. for ambulation.  Pt. currently presents with decreased strength, decreased balance, and decreased tolerance to functional activity. This AM, pt. able to ambulate 30 feet, CGA x 1, with use of Rwx.  Pt. requires CGA x 1 for sit <-> stand transfers.  BLE ther. ex. program x 10 reps completed for general strengthening.  Pt. will benefit from skilled inpt. P.T. to address her functional deficits and to assist pt. in regaining her maximum level of independence with functional mobility.      Anticipated Discharge Disposition (PT): home with assist, home with home health                         Marcello George is here today for Follow-up (5 month follow up for Diabetes, Lipids, HTN, A Fib, Obesity. )        Medications: medications verified, no change  Refills needed today? No     Tobacco history: verified     Advanced Directives: Yes on file.    BP >140/90? No    Care Teams Updated? Yes    Patient Preference for result Communication via: [employer]    Cell Phone:   Telephone Information:   Mobile [phone]     Okay to leave a message containing results? Yes    Health Maintenance Due   Topic Date Due    Diabetes Eye Exam  Never done    Colorectal Cancer Screen-  06/29/2021    COVID-19 Vaccine (7 - 2023-24 season) 09/01/2023      Patient is due for topics as listed above but is not proceeding with Immunization(s) COVID-19 at this time. .  Immunization History   Administered Date(s) Administered    COVID Pfizer 12Y+ (Requires Dilution) 12/30/2020, 12/30/2020, 01/29/2021, 01/29/2021, 11/02/2021    COVID Pfizer Bivalent 12Y+ 10/18/2022    Hep B, Unspecified Formulation 12/01/1987, 01/12/1988, 07/12/1988    Influenza, Unspecified Formulation 11/23/2015, 11/23/2015, 10/14/2016, 10/14/2016    Influenza, intradermal, quadrivalent, preservative free 09/28/2018    Influenza, quadrivalent, adjuvanted (Fluad) 10/18/2022    Influenza, quadrivalent, multi-dose 10/14/2016    Influenza, quadrivalent, preserve-free 10/11/2017, 10/11/2017, 10/05/2018, 10/05/2018, 10/24/2019, 10/24/2019, 10/28/2020, 10/28/2020, 11/02/2021, 10/14/2023    Pneumococcal Conjugate 13 Valent Vacc (Prevnar 13) 03/17/2021, 07/23/2021    Pneumococcal Conjugate 20 Valent Vacc (Prevnar 20) 08/29/2022    Shingrix (Shingles Zoster) 07/23/2021, 08/29/2022    TD Adult, Unspecified Formulation 10/11/1988, 07/26/1999    Tdap 10/10/2014         PHQ 2:  PHQ 2 Score Adult PHQ 2 Score Adult PHQ 2 Interpretation Little interest or pleasure in activity?   3/22/2024  10:04 AM 0 No further screening needed 0       PHQ 9:

## 2024-05-21 NOTE — CASE MANAGEMENT/SOCIAL WORK
Continued Stay Note  [facility]     Patient Name: Che Landaverde  MRN: [id number]  Today's Date: 5/21/2024    Admit Date: 5/16/2024    Plan: Home with    Discharge Plan       Row Name 05/21/24 1303       Plan    Plan Home with     Patient/Family in Agreement with Plan yes    Plan Comments [facility]  accepted and [facility] will be providing oxygen and rollator. RICARDO Ramirez RN                   Discharge Codes    No documentation.                 Expected Discharge Date and Time       Expected Discharge Date Expected Discharge Time    May 23, 2024               Federico Nuñez RN

## 2024-05-21 NOTE — PROGRESS NOTES
"    Chief Complaint: Pneumothorax  S/P: Chest tube placement      Subjective:  Symptoms:  Improved.  She reports cough (Productive).  No shortness of breath.  Chest pain: Improved tenderness around chest tube..  Diet:  Adequate intake.  No nausea or vomiting.    Activity level: Returning to normal.    Pain:  She complains of pain that is mild.  Pain is well controlled.    No new complaints.  Improved discomfort around chest tube with as needed medications.  Still with productive cough    Vital Signs:  Temp:  [97.6 °F (36.4 °C)-98.3 °F (36.8 °C)] 97.6 °F (36.4 °C)  Heart Rate:  [] 83  Resp:  [16-18] 18  BP: (119-155)/(62-70) 120/62    Intake & Output (last day)         05/20 0701  05/21 0700 05/21 0701  05/22 0700    P.O. 480 240    Total Intake(mL/kg) 480 (6.6) 240 (3.3)    Urine (mL/kg/hr) 0 (0)     Stool 0     Chest Tube 5     Total Output 5     Net +475 +240          Urine Unmeasured Occurrence 1 x     Stool Unmeasured Occurrence 1 x             Objective:  General Appearance:  Comfortable, well-appearing and in no acute distress.    Vital signs: (most recent): Blood pressure 120/62, pulse 83, temperature 97.6 °F (36.4 °C), temperature source Oral, resp. rate 18, height 170.2 cm (67\"), weight 73 kg (161 lb), SpO2 98%, not currently breastfeeding.  Vital signs are normal.    Output: Producing urine.    Lungs:  Normal effort.  There are decreased breath sounds.    Chest: Symmetric chest wall expansion. Chest wall tenderness (Around chest tube) present.    Neurological: Patient is alert and oriented to person, place and time.    Skin:  Warm and dry.                Chest tube:   Site: Left, Clean, Dry, and Intact  Suction: waterseal  Air Leak: Negative   24 Hour Total: 5cc    Results Review:     I reviewed the patient's new clinical results.  I reviewed the patient's new imaging results and agree with the interpretation.  I reviewed the patient's other test results and agree with the interpretation    Imaging " Results (Last 24 Hours)       Procedure Component Value Units Date/Time    XR Chest 1 View [[id number]] Resulted: 05/21/24 1229     Updated: 05/21/24 1229    XR Chest 1 View [[id number]] Collected: 05/21/24 0701     Updated: 05/21/24 0706    Narrative:      XR CHEST 1 VW-     Clinical: Chest tube management     COMPARISON examination dated 5/20/2024     FINDINGS: Cardiac size within normal limits. No mediastinal abnormality.  Infiltrate demonstrated within the right mid and lower lung zone and  left base as before. No new area of airspace disease has developed.  Calcified benign granuloma left upper lobe again seen. No vascular  congestion. Chest tube located at the left base along the costophrenic  sulcus. No pneumothorax seen.     CONCLUSION: Stable chest     This report was finalized on 5/21/2024 7:03 AM by Dr. Arnold Simons M.D  on Workstation: [id number]       CT Angiogram Chest [[id number]] Collected: 05/20/24 1338     Updated: 05/20/24 1359    Narrative:      CT ANGIOGRAM CHEST-     DATE OF EXAM: 5/20/2024 12:38 PM     INDICATION: Shortness of breath. Elevated D-dimer. Rule out PE.  Pneumonia.     COMPARISON: Radiographs 5/20/2024, 5/19/2024, and 5/18/2024. CT guided  chest tube placement 5/17/2024. CT chest 5/9/2024, 4/2/2024, and  3/13/2023.     TECHNIQUE: Multiple contiguous axial images were acquired through the  chest following the intravenous administration of 95 mL of Isovue-370.  Reformatted coronal and sagittal sequences and a 3D reconstruction image  were also reviewed. Radiation dose reduction techniques were utilized,  including automated exposure control and exposure modulation based on  body size.     FINDINGS:  No evidence of a pulmonary arterial filling defect to suggest pulmonary  embolism. The heart and great vessels of the chest are normal in size.  Mild calcified coronary artery disease. No pericardial effusion. Aortic  valve calcifications. Mild calcified underscore of disease in  the  thoracic aorta without aneurysm. Enlarged mediastinal and bilateral  hilar lymph nodes could be reactive an index anterior subcarinal lymph  node measures 2 cm x 1.2 cm (axial series 5 image 59).     Worsening patchy and nodular consolidation throughout each lower lobe  and the posterior right upper lobe and right middle lobe, likely  pneumonia. Scattered groundglass opacities, sub-6 mm tree-in-bud  nodules, and interstitial thickening throughout both lungs, also likely  infectious. Benign calcified granulomas in the left lung. Mild elevation  of the right hemidiaphragm with multifocal bibasilar subsegmental  atelectasis and/or scarring. Mild to moderate chronic emphysematous  changes in both lungs. Multifocal bronchial plugging in the right lower  lobe. Lower lateral left chest pigtail pleural catheter in place with no  residual pneumothorax. Small bilateral pleural effusions.     Limited imaging of the upper abdomen is unremarkable.     Mild multilevel thoracic spondylosis. No acute osseous abnormality or  concerning osseous lesion.       Impression:         1. The study is negative for pulmonary embolism.  2. Worsening patchy and nodular consolidation throughout each lower lobe  and the posterior right upper lobe and right middle lobe, likely  pneumonia.  3. Scattered groundglass opacities, sub-6 mm tree-in-bud nodules, and  interstitial thickening throughout both lungs, also likely infectious.  4. Multifocal bronchial plugging in the right lower lobe.  5. Stable left chest pigtail pleural catheter with no residual  pneumothorax.  6. Small bilateral pleural effusions.     This report was finalized on 5/20/2024 1:56 PM by Matt Enamorado MD on  Workstation: [id number]               Lab Results:     Lab Results (last 24 hours)       Procedure Component Value Units Date/Time    MRSA Screen, PCR (Inpatient) - Swab, Nares [[id number]]  (Normal) Collected: 05/21/24 1019    Specimen: Swab from Nares Updated:  "05/21/24 1146     MRSA PCR No MRSA Detected    Narrative:      The negative predictive value of this diagnostic test is high and should only be used to consider de-escalating anti-MRSA therapy. A positive result may indicate colonization with MRSA and must be correlated clinically.    Procalcitonin [[id number]]  (Normal) Collected: 05/21/24 1000    Specimen: Blood Updated: 05/21/24 1042     Procalcitonin 0.10 ng/mL     Narrative:      As a Marker for Sepsis (Non-Neonates):    1. <0.5 ng/mL represents a low risk of severe sepsis and/or septic shock.  2. >2 ng/mL represents a high risk of severe sepsis and/or septic shock.    As a Marker for Lower Respiratory Tract Infections that require antibiotic therapy:    PCT on Admission    Antibiotic Therapy       6-12 Hrs later    >0.5                Strongly Recommended  >0.25 - <0.5        Recommended   0.1 - 0.25          Discouraged              Remeasure/reassess PCT  <0.1                Strongly Discouraged     Remeasure/reassess PCT    As 28 day mortality risk marker: \"Change in Procalcitonin Result\" (>80% or <=80%) if Day 0 (or Day 1) and Day 4 values are available. Refer to http://www.[facility] [place]p[place]cal[place]    Change in PCT <=80%  A decrease of PCT levels below or equal to 80% defines a positive change in PCT test result representing a higher risk for 28-day all-cause mortality of patients diagnosed with severe sepsis for septic shock.    Change in PCT >80%  A decrease of PCT levels of more than 80% defines a negative change in PCT result representing a lower risk for 28-day all-cause mortality of patients diagnosed with severe sepsis or septic shock.       Magnesium [[id number]]  (Normal) Collected: 05/21/24 1000    Specimen: Blood Updated: 05/21/24 1038     Magnesium 2.0 mg/dL     Basic Metabolic Panel [[id number]]  (Abnormal) Collected: 05/21/24 1000    Specimen: Blood Updated: 05/21/24 1038     Glucose 128 mg/dL      BUN 8 mg/dL      Creatinine 0.62 mg/dL      " "Sodium 131 mmol/L      Potassium 4.5 mmol/L      Chloride 94 mmol/L      CO2 27.0 mmol/L      Calcium 9.3 mg/dL      BUN/Creatinine Ratio 12.9     Anion Gap 10.0 mmol/L      eGFR 93.0 mL/min/1.73     Narrative:      GFR Normal >60  Chronic Kidney Disease <60  Kidney Failure <15    The GFR formula is only valid for adults with stable renal function between ages 18 and 70.    Phosphorus [[id number]]  (Normal) Collected: 05/21/24 1000    Specimen: Blood Updated: 05/21/24 1036     Phosphorus 3.2 mg/dL     Histoplasma Antigen, CSF or BAL - Lavage, Lung, Right Middle Lobe [[id number]] Collected: 05/16/24 1327    Specimen: Lavage from Lung, Right Middle Lobe Updated: 05/21/24 1022     Result None Detected ng/mL      Comment: Reference Interval: None Detected  Reportable Range: Positive Results reported in ng/mL from  0.20 ng/mL to 20.00 ng/mL. Positive results above 20.00 ng/mL  are reported as \"Above the Limit of Quantification\"  Cross-reactions occur with Blastomyces spp., Coccidioides spp.,  and Paracoccidioides brasiliensis.  The test was developed and its performance characteristics  determined by [employer]. It has not been cleared  or approved by the FDA; however, FDA clearance or approval is  not currently required for clinical use. The results are not  intended to be used as the sole means for clinical diagnosis  or patient management decisions.        Interpretation Negative     Specimen Type BAL    Narrative:      Performed at:   - [facility]  [address] [place] [address] [place]  [id number]  : Kezia Mulligan MD, Phone:  [phone]    CBC & Differential [[id number]]  (Abnormal) Collected: 05/21/24 1000    Specimen: Blood Updated: 05/21/24 1014    Narrative:      The following orders were created for panel order CBC & Differential.  Procedure                               Abnormality         Status                     ---------                               -----------         ------ "                     CBC Auto Differential[[id number]]        Abnormal            Final result                 Please view results for these tests on the individual orders.    CBC Auto Differential [[id number]]  (Abnormal) Collected: 05/21/24 1000    Specimen: Blood Updated: 05/21/24 1014     WBC 17.94 10*3/mm3      RBC 3.70 10*6/mm3      Hemoglobin 10.4 g/dL      Hematocrit 32.2 %      MCV 87.0 fL      MCH 28.1 pg      MCHC 32.3 g/dL      RDW 13.5 %      RDW-SD 42.1 fl      MPV 8.9 fL      Platelets 750 10*3/mm3      Neutrophil % 76.0 %      Lymphocyte % 10.6 %      Monocyte % 10.4 %      Eosinophil % 1.4 %      Basophil % 0.3 %      Immature Grans % 1.3 %      Neutrophils, Absolute 13.64 10*3/mm3      Lymphocytes, Absolute 1.90 10*3/mm3      Monocytes, Absolute 1.87 10*3/mm3      Eosinophils, Absolute 0.25 10*3/mm3      Basophils, Absolute 0.05 10*3/mm3      Immature Grans, Absolute 0.23 10*3/mm3      nRBC 0.0 /100 WBC              Assessment & Plan       Pneumothorax       Assessment & Plan  Ms. Main is a pleasant 73-year-old lady with a pneumothorax s/p chest tube placement as well as pneumonia.      Improving oxygen requirements.  She reports improvement of her chest wall tenderness around chest tube.  Chest tube was transitioned to waterseal yesterday.  A.m. chest x-ray reviewed which demonstrates no appreciable pneumothorax.  No airleak on exam today.  Will clamp chest tube and obtain follow-up chest x-ray this afternoon.  If unremarkable continue chest tube clamped and repeat a.m. chest x-ray.  Likely removal of chest tube tomorrow.  Encourage pulmonary hygiene, add Mucinex and OPEP.  Increase activity as able.    KARINE Jasso  [facility]  05/21/24  13:05 EDT

## 2024-05-21 NOTE — THERAPY EVALUATION
Patient Name: Che Landaverde  : 1950    MRN: [id number]                              Today's Date: 2024       Admit Date: 2024    Visit Dx:     ICD-10-CM ICD-9-CM   1. Pneumonia  J18.9 486     Patient Active Problem List   Diagnosis    Gastroesophageal reflux disease    Hoarseness    Essential hypertension    Migraine    Dupuytren's contracture    Mixed simple and mucopurulent chronic bronchitis    Type 2 diabetes mellitus with hyperglycemia, without long-term current use of insulin    Chronic left-sided low back pain without sciatica    Seasonal allergies    PAD (peripheral artery disease)    Celiac disease    Pneumothorax     Past Medical History:   Diagnosis Date    Aggressive former smoker     Allergic rhinitis     Anxiety     Asthma     Atherosclerotic PVD with intermittent claudication 2022    Benign essential hypertension     Chronic obstructive pulmonary disease, unspecified COPD type 2022    Cough     Diabetes mellitus     Diarrhea     Encounter for screening for lung cancer     GERD (gastroesophageal reflux disease)     Hip arthrosis     Internal hemorrhoids     Laceration of skin of lower leg     Lung nodule     Microscopic colitis     Migraine, unspecified, not intractable, without status migrainosus     Need for influenza vaccination     Need for pneumococcal vaccination     Osteoporosis     Painful hip     Pleural effusion 2020    Reactive airway disease with wheezing with acute exacerbation     Seasonal allergies     Sleep disturbances     Smoking history     Varicose veins of bilateral lower extremities with other complications 2022    Venous insufficiency (chronic) (peripheral) 2022    Venous ulcer 2022     Past Surgical History:   Procedure Laterality Date    BRAVO PROCEDURE N/A 2016    Procedure: ESOPHAGOGASTRODUODENOSCOPY AND BRAVO ;  Surgeon: Laila Trevino MD;  Location: [facility] ENDOSCOPY;  Service:     BRONCHOSCOPY N/A 2024     Procedure: BRONCHOSCOPY WITH FLUORO with BAL and biopsy;  Surgeon: Ramon Edwards MD;  Location:  [facility] ENDOSCOPY;  Service: Robotics - Pulmonary;  Laterality: N/A;  pre: pneumonia  post: same    CATARACT EXTRACTION  07/13/2015    COLONOSCOPY  03/06/2015    ih 3/15 Dr. Trevino    SIGMOIDOSCOPY N/A 06/07/2016    Procedure: SIGMOIDOSCOPY FLEXIBLE to transverse colon with biopsy;  Surgeon: Laila Trevino MD;  Location:  [facility] ENDOSCOPY;  Service:       General Information       Row Name 05/21/24 [facility]7          OT Time and Intention    Document Type evaluation  -     Mode of Treatment occupational therapy;co-treatment  -       Row Name 05/21/24 [facility]7          General Information    Patient Profile Reviewed yes  -     Prior Level of Function independent:;ADL's;all household mobility  w/o AD. Has a walker she uses occasionally  -     Existing Precautions/Restrictions fall;oxygen therapy device and L/min  3L O2, L chest tube  -     Barriers to Rehab none identified  -       Row Name 05/21/24 [facility]7          Living Environment    People in Home alone  -       Row Name 05/21/24 [facility]7          Cognition    Orientation Status (Cognition) oriented x 3  -       Row Name 05/21/24 [facility]7          Safety Issues, Functional Mobility    Impairments Affecting Function (Mobility) endurance/activity tolerance;shortness of breath  -     Comment, Safety Issues/Impairments (Mobility) Pt is co-tx appropriate d/t decreased activity tolerance and to maximize pt participation and safety with functional activity.  -               User Key  (r) = Recorded By, (t) = Taken By, (c) = Cosigned By      Initials Name Provider Type     Aaliyah Loredo OT Occupational Therapist                     Mobility/ADL's       Row Name 05/21/24 1228          Bed Mobility    Comment, (Bed Mobility) UIC  -       Row Name 05/21/24 1228          Transfers    Transfers sit-stand transfer  -       Row Name 05/21/24 1228          Sit-Stand Transfer     Sit-Stand [place] (Transfers) contact guard  -     Assistive Device (Sit-Stand Transfers) walker, front-wheeled  -JW       Row Name 05/21/24 1228          Functional Mobility    Functional Mobility- Ind. Level contact guard assist  -     Functional Mobility- Device walker, front-wheeled  -     Functional Mobility- Comment to room sink, slow moving. Assist for line mgt of chest tube, IV pole, and O2 line  -JW       Row Name 05/21/24 1228          Activities of Daily Living    BADL Assessment/Intervention grooming  -JW       Row Name 05/21/24 1228          Grooming Assessment/Training    [place] Level (Grooming) oral care regimen;set up;supervision  -     Oral Care teeth brushed - regular toothbrush  -     Position (Grooming) sink side;supported standing  -               User Key  (r) = Recorded By, (t) = Taken By, (c) = Cosigned By      Initials Name Provider Type     Aaliyah Loredo OT Occupational Therapist                   Obj/Interventions       Row Name 05/21/24 1231          Sensory Assessment (Somatosensory)    Sensory Assessment (Somatosensory) sensation intact  -JW       Row Name 05/21/24 1231          Vision Assessment/Intervention    Visual Impairment/Limitations WNL  -JW       Row Name 05/21/24 1231          Range of Motion Comprehensive    General Range of Motion bilateral upper extremity ROM WNL  -JW       Row Name 05/21/24 1231          Strength Comprehensive (MMT)    Comment, General Manual Muscle Testing (MMT) Assessment UE strength WFL  -JW       Row Name 05/21/24 1231          Motor Skills    Motor Skills functional endurance  -     Functional Endurance fair+ . SpO2 between 88-92% with 3L O2  -JW       Row Name 05/21/24 1231          Balance    Balance Assessment standing static balance;standing dynamic balance  -     Static Standing Balance contact guard  -     Dynamic Standing Balance contact guard  -     Position/Device Used, Standing Balance supported;walker,  front-wheeled  -JW     Balance Interventions sitting;standing;occupation based/functional task  -               User Key  (r) = Recorded By, (t) = Taken By, (c) = Cosigned By      Initials Name Provider Type    JW Aaliyah Loredo OT Occupational Therapist                   Goals/Plan       Row Name 05/21/24 1238          Transfer Goal 1 (OT)    Activity/Assistive Device (Transfer Goal 1, OT) transfers, all  -     [place] Level/Cues Needed (Transfer Goal 1, OT) supervision required  -JW     Time Frame (Transfer Goal 1, OT) short term goal (STG);2 weeks  -JW     Progress/Outcome (Transfer Goal 1, OT) goal ongoing  -       Row Name 05/21/24 1238          Bathing Goal 1 (OT)    Activity/Device (Bathing Goal 1, OT) bathing skills, all;grab bar, tub/shower;hand-held shower spray hose;long-handled sponge;shower chair  -     [place] Level/Cues Needed (Bathing Goal 1, OT) standby assist;set-up required  -     Time Frame (Bathing Goal 1, OT) short term goal (STG);2 weeks  -     Strategies/Barriers (Bathing Goal 1, OT) using energy conservation techs as needed  -     Progress/Outcomes (Bathing Goal 1, OT) goal ongoing  -       Row Name 05/21/24 1238          Dressing Goal 1 (OT)    Activity/Device (Dressing Goal 1, OT) lower body dressing  -     [place]/Cues Needed (Dressing Goal 1, OT) modified independence  -     Time Frame (Dressing Goal 1, OT) short term goal (STG);2 weeks  -     Progress/Outcome (Dressing Goal 1, OT) goal ongoing  -       Row Name 05/21/24 1238          Toileting Goal 1 (OT)    Activity/Device (Toileting Goal 1, OT) toileting skills, all  -     [place] Level/Cues Needed (Toileting Goal 1, OT) set-up required  -     Time Frame (Toileting Goal 1, OT) short term goal (STG);2 weeks  -     Progress/Outcome (Toileting Goal 1, OT) goal ongoing  -       Row Name 05/21/24 1238          Problem Specific Goal 1 (OT)    Problem Specific Goal 1 (OT) Pt will perform fxl  ambulation to and from bathroom with spv for safety  -     Time Frame (Problem Specific Goal 1, OT) short term goal (STG);2 weeks  -     Progress/Outcome (Problem Specific Goal 1, OT) goal ongoing  -       Row Name 05/21/24 1238          Therapy Assessment/Plan (OT)    Planned Therapy Interventions (OT) activity tolerance training;adaptive equipment training;BADL retraining;occupation/activity based interventions;patient/caregiver education/training;ROM/therapeutic exercise;transfer/mobility retraining  -               User Key  (r) = Recorded By, (t) = Taken By, (c) = Cosigned By      Initials Name Provider Type    JW Aaliyah Loredo, JESSICA Occupational Therapist                   Clinical Impression       Row Name 05/21/24 1233          Pain Assessment    Pretreatment Pain Rating 3/10  -     Posttreatment Pain Rating 3/10  -     Pain Location generalized  -       Row Name 05/21/24 1233          Plan of Care Review    Plan of Care Reviewed With patient  -     Outcome Evaluation Pt is a 74 y/o F admitted with pneumothorax, PNA, COPD exacerbation, s/p bronch and L chest tube placement. Pt typically lives alone and is indep with ADLs and fxl mobility w/o AD but has a walker available. She was found sitting UIC, on 3L O2 via NC. CGA for STS and fxl ambulation to the sink using RW. Assist for line mgt of CT, IV pole and O2. She stands multiple mins to complete grooming tasks with set-up/spv. SpO2 monitored and is w/in 88-91% t/o ax. Returns to chair and left wth all needs in reach. Demo's dec endurance and act tolerance from baseline. Pt plans to d/c home with family staying with her and assisting as needed. Will also benefit from  OT.  -       Row Name 05/21/24 4105          Therapy Assessment/Plan (OT)    Rehab Potential (OT) good, to achieve stated therapy goals  -     Criteria for Skilled Therapeutic Interventions Met (OT) yes;skilled treatment is necessary  -     Therapy Frequency (OT) 5 times/wk   -       Row Name 05/21/24 1233          Therapy Plan Review/Discharge Plan (OT)    Anticipated Discharge Disposition (OT) home with assist;home with home health  -       Row Name 05/21/24 1233          Positioning and Restraints    Pre-Treatment Position sitting in chair/recliner  -     Post Treatment Position chair  -JW     In Chair notified nsg;sitting;call light within reach;encouraged to call for assist;exit alarm on;legs elevated  -               User Key  (r) = Recorded By, (t) = Taken By, (c) = Cosigned By      Initials Name Provider Type    Aaliyah De La Garza, JESSICA Occupational Therapist                   Outcome Measures       Row Name 05/21/24 1239          How much help from another is currently needed...    Putting on and taking off regular lower body clothing? 3  -JW     Bathing (including washing, rinsing, and drying) 3  -JW     Toileting (which includes using toilet bed pan or urinal) 3  -JW     Putting on and taking off regular upper body clothing 3  -JW     Taking care of personal grooming (such as brushing teeth) 3  -JW     Eating meals 4  -JW     AM-PAC 6 Clicks Score (OT) 19  -JW       Row Name 05/21/24 1124 05/21/24 0810       How much help from another person do you currently need...    Turning from your back to your side while in flat bed without using bedrails? 3  -MS 3  -SS    Moving from lying on back to sitting on the side of a flat bed without bedrails? 3  -MS 3  -SS    Moving to and from a bed to a chair (including a wheelchair)? 3  -MS 3  -SS    Standing up from a chair using your arms (e.g., wheelchair, bedside chair)? 3  -MS 3  -SS    Climbing 3-5 steps with a railing? 3  -MS 3  -SS    To walk in hospital room? 3  -MS 3  -SS    AM-PAC 6 Clicks Score (PT) 18  -MS 18  -SS    Highest Level of Mobility Goal 6 --> Walk 10 steps or more  -MS 6 --> Walk 10 steps or more  -SS      Row Name 05/21/24 1239 05/21/24 1124       Functional Assessment    Outcome Measure Options AM-PAC 6 Clicks  Daily Activity (OT)  - AM-PAC 6 Clicks Basic Mobility (PT)  -MS              User Key  (r) = Recorded By, (t) = Taken By, (c) = Cosigned By      Initials Name Provider Type    Peter Benitez PATRICK, PT Physical Therapist    Breanna Pelayo, RN Registered Nurse    Aaliyah De La Garza OT Occupational Therapist                    Occupational Therapy Education       Title: PT OT SLP Therapies (In Progress)       Topic: Occupational Therapy (In Progress)       Point: ADL training (Done)       Description:   Instruct learner(s) on proper safety adaptation and remediation techniques during self care or transfers.   Instruct in proper use of assistive devices.                  Learning Progress Summary             Patient Acceptance, E, VU by SHEILA at 5/21/2024 1240                         Point: Home exercise program (Not Started)       Description:   Instruct learner(s) on appropriate technique for monitoring, assisting and/or progressing therapeutic exercises/activities.                  Learner Progress:  Not documented in this visit.              Point: Precautions (Done)       Description:   Instruct learner(s) on prescribed precautions during self-care and functional transfers.                  Learning Progress Summary             Patient Acceptance, E, VU by SHEILA at 5/21/2024 1240                         Point: Body mechanics (Done)       Description:   Instruct learner(s) on proper positioning and spine alignment during self-care, functional mobility activities and/or exercises.                  Learning Progress Summary             Patient Acceptance, E, VU by SHEILA at 5/21/2024 1240                                         User Key       Initials Effective Dates Name Provider Type [facility] 06/10/21 -  Aaliyah Loredo OT Occupational Therapist OT                  OT Recommendation and Plan  Planned Therapy Interventions (OT): activity tolerance training, adaptive equipment training, BADL retraining,  occupation/activity based interventions, patient/caregiver education/training, ROM/therapeutic exercise, transfer/mobility retraining  Therapy Frequency (OT): 5 times/wk  Plan of Care Review  Plan of Care Reviewed With: patient  Outcome Evaluation: Pt is a 72 y/o F admitted with pneumothorax, PNA, COPD exacerbation, s/p bronch and L chest tube placement. Pt typically lives alone and is indep with ADLs and fxl mobility w/o AD but has a walker available. She was found sitting UIC, on 3L O2 via NC. CGA for STS and fxl ambulation to the sink using RW. Assist for line mgt of CT, IV pole and O2. She stands multiple mins to complete grooming tasks with set-up/spv. SpO2 monitored and is w/in 88-91% t/o ax. Returns to chair and left wth all needs in reach. Demo's dec endurance and act tolerance from baseline. Pt plans to d/c home with family staying with her and assisting as needed. Will also benefit from  OT.     Time Calculation:   Evaluation Complexity (OT)  Review Occupational Profile/Medical/Therapy History Complexity: expanded/moderate complexity  Assessment, Occupational Performance/Identification of Deficit Complexity: 3-5 performance deficits  Clinical Decision Making Complexity (OT): detailed assessment/moderate complexity  Overall Complexity of Evaluation (OT): moderate complexity     Time Calculation- OT       Row Name 05/21/24 1240             Time Calculation- OT    OT Start Time 1026  -      OT Stop Time 1043  -      OT Time Calculation (min) 17 min  -      Total Timed Code Minutes- OT 8 minute(s)  -JW      OT Received On 05/21/24  -      OT - Next Appointment 05/22/24  -      OT Goal Re-Cert Due Date 06/04/24  -         Timed Charges    39909 - OT Self Care/Mgmt Minutes 8  -JW         Untimed Charges    OT Eval/Re-eval Minutes 9  -JW         Total Minutes    Timed Charges Total Minutes 8  -JW      Untimed Charges Total Minutes 9  -JW       Total Minutes 17  -JW                User Key  (r) =  Recorded By, (t) = Taken By, (c) = Cosigned By      Initials Name Provider Type     Aaliyah Loredo OT Occupational Therapist                  Therapy Charges for Today       Code Description Service Date Service Provider Modifiers Qty    [id number] HC OT SELF CARE/MGMT/TRAIN EA 15 MIN 5/21/2024 Aaliyah Loredo OT GO 1    [id number]  OT EVAL MOD COMPLEXITY 2 5/21/2024 Aaliyah Loredo OT GO 1                 Aaliyah Loredo OT  5/21/2024

## 2024-05-22 ENCOUNTER — APPOINTMENT (OUTPATIENT)
Dept: GENERAL RADIOLOGY | Facility: HOSPITAL | Age: 74
End: 2024-05-22
Payer: MEDICARE

## 2024-05-22 LAB
ANION GAP SERPL CALCULATED.3IONS-SCNC: 11.8 MMOL/L (ref 5–15)
BASOPHILS # BLD AUTO: 0.06 10*3/MM3 (ref 0–0.2)
BASOPHILS NFR BLD AUTO: 0.4 % (ref 0–1.5)
BUN SERPL-MCNC: 9 MG/DL (ref 8–23)
BUN/CREAT SERPL: 14.8 (ref 7–25)
CALCIUM SPEC-SCNC: 9.2 MG/DL (ref 8.6–10.5)
CHLORIDE SERPL-SCNC: 96 MMOL/L (ref 98–107)
CO2 SERPL-SCNC: 27.2 MMOL/L (ref 22–29)
CREAT SERPL-MCNC: 0.61 MG/DL (ref 0.57–1)
DEPRECATED RDW RBC AUTO: 44.6 FL (ref 37–54)
EGFRCR SERPLBLD CKD-EPI 2021: 93.4 ML/MIN/1.73
EOSINOPHIL # BLD AUTO: 0.36 10*3/MM3 (ref 0–0.4)
EOSINOPHIL NFR BLD AUTO: 2.5 % (ref 0.3–6.2)
ERYTHROCYTE [DISTWIDTH] IN BLOOD BY AUTOMATED COUNT: 13.7 % (ref 12.3–15.4)
FUNGUS WND CULT: ABNORMAL
GLUCOSE SERPL-MCNC: 110 MG/DL (ref 65–99)
HCT VFR BLD AUTO: 31.6 % (ref 34–46.6)
HGB BLD-MCNC: 10 G/DL (ref 12–15.9)
IMM GRANULOCYTES # BLD AUTO: 0.33 10*3/MM3 (ref 0–0.05)
IMM GRANULOCYTES NFR BLD AUTO: 2.3 % (ref 0–0.5)
LYMPHOCYTES # BLD AUTO: 2.56 10*3/MM3 (ref 0.7–3.1)
LYMPHOCYTES NFR BLD AUTO: 17.5 % (ref 19.6–45.3)
MAGNESIUM SERPL-MCNC: 2 MG/DL (ref 1.6–2.4)
MCH RBC QN AUTO: 28.1 PG (ref 26.6–33)
MCHC RBC AUTO-ENTMCNC: 31.6 G/DL (ref 31.5–35.7)
MCV RBC AUTO: 88.8 FL (ref 79–97)
MONOCYTES # BLD AUTO: 1.91 10*3/MM3 (ref 0.1–0.9)
MONOCYTES NFR BLD AUTO: 13.1 % (ref 5–12)
NEUTROPHILS NFR BLD AUTO: 64.2 % (ref 42.7–76)
NEUTROPHILS NFR BLD AUTO: 9.37 10*3/MM3 (ref 1.7–7)
NRBC BLD AUTO-RTO: 0 /100 WBC (ref 0–0.2)
PHOSPHATE SERPL-MCNC: 5.1 MG/DL (ref 2.5–4.5)
PLATELET # BLD AUTO: 705 10*3/MM3 (ref 140–450)
PMV BLD AUTO: 9.2 FL (ref 6–12)
POTASSIUM SERPL-SCNC: 4.3 MMOL/L (ref 3.5–5.2)
RBC # BLD AUTO: 3.56 10*6/MM3 (ref 3.77–5.28)
SODIUM SERPL-SCNC: 135 MMOL/L (ref 136–145)
WBC NRBC COR # BLD AUTO: 14.59 10*3/MM3 (ref 3.4–10.8)

## 2024-05-22 PROCEDURE — 94799 UNLISTED PULMONARY SVC/PX: CPT

## 2024-05-22 PROCEDURE — 85025 COMPLETE CBC W/AUTO DIFF WBC: CPT | Performed by: HOSPITALIST

## 2024-05-22 PROCEDURE — 71045 X-RAY EXAM CHEST 1 VIEW: CPT

## 2024-05-22 PROCEDURE — 25010000002 PIPERACILLIN SOD-TAZOBACTAM PER 1 G: Performed by: HOSPITALIST

## 2024-05-22 PROCEDURE — 83735 ASSAY OF MAGNESIUM: CPT | Performed by: HOSPITALIST

## 2024-05-22 PROCEDURE — 99232 SBSQ HOSP IP/OBS MODERATE 35: CPT

## 2024-05-22 PROCEDURE — 84100 ASSAY OF PHOSPHORUS: CPT | Performed by: HOSPITALIST

## 2024-05-22 PROCEDURE — 94762 N-INVAS EAR/PLS OXIMTRY CONT: CPT

## 2024-05-22 PROCEDURE — 94664 DEMO&/EVAL PT USE INHALER: CPT

## 2024-05-22 PROCEDURE — 97110 THERAPEUTIC EXERCISES: CPT

## 2024-05-22 PROCEDURE — 94761 N-INVAS EAR/PLS OXIMETRY MLT: CPT

## 2024-05-22 PROCEDURE — 80048 BASIC METABOLIC PNL TOTAL CA: CPT | Performed by: HOSPITALIST

## 2024-05-22 RX ADMIN — CITALOPRAM 20 MG: 20 TABLET, FILM COATED ORAL at 08:07

## 2024-05-22 RX ADMIN — BUDESONIDE AND FORMOTEROL FUMARATE DIHYDRATE 2 PUFF: 160; 4.5 AEROSOL RESPIRATORY (INHALATION) at 10:52

## 2024-05-22 RX ADMIN — IPRATROPIUM BROMIDE AND ALBUTEROL SULFATE 3 ML: .5; 3 SOLUTION RESPIRATORY (INHALATION) at 15:20

## 2024-05-22 RX ADMIN — METOPROLOL TARTRATE 25 MG: 25 TABLET, FILM COATED ORAL at 08:06

## 2024-05-22 RX ADMIN — PIPERACILLIN AND TAZOBACTAM 3.38 G: 3; .375 INJECTION, POWDER, FOR SOLUTION INTRAVENOUS at 08:07

## 2024-05-22 RX ADMIN — Medication 10 ML: at 20:44

## 2024-05-22 RX ADMIN — GUAIFENESIN 1200 MG: 600 TABLET, EXTENDED RELEASE ORAL at 20:43

## 2024-05-22 RX ADMIN — BUDESONIDE AND FORMOTEROL FUMARATE DIHYDRATE 2 PUFF: 160; 4.5 AEROSOL RESPIRATORY (INHALATION) at 20:07

## 2024-05-22 RX ADMIN — PIPERACILLIN AND TAZOBACTAM 3.38 G: 3; .375 INJECTION, POWDER, FOR SOLUTION INTRAVENOUS at 01:00

## 2024-05-22 RX ADMIN — MONTELUKAST SODIUM 10 MG: 10 TABLET, FILM COATED ORAL at 08:07

## 2024-05-22 RX ADMIN — GUAIFENESIN 1200 MG: 600 TABLET, EXTENDED RELEASE ORAL at 08:06

## 2024-05-22 RX ADMIN — Medication 10 ML: at 08:31

## 2024-05-22 RX ADMIN — IPRATROPIUM BROMIDE AND ALBUTEROL SULFATE 3 ML: .5; 3 SOLUTION RESPIRATORY (INHALATION) at 10:46

## 2024-05-22 RX ADMIN — CILOSTAZOL 50 MG: 100 TABLET ORAL at 20:44

## 2024-05-22 RX ADMIN — BENZONATATE 200 MG: 100 CAPSULE ORAL at 06:26

## 2024-05-22 RX ADMIN — CILOSTAZOL 50 MG: 100 TABLET ORAL at 08:06

## 2024-05-22 RX ADMIN — METOPROLOL TARTRATE 25 MG: 25 TABLET, FILM COATED ORAL at 20:44

## 2024-05-22 RX ADMIN — PANTOPRAZOLE SODIUM 40 MG: 40 TABLET, DELAYED RELEASE ORAL at 06:24

## 2024-05-22 RX ADMIN — ACETAMINOPHEN 650 MG: 325 TABLET, FILM COATED ORAL at 16:51

## 2024-05-22 RX ADMIN — ROSUVASTATIN CALCIUM 10 MG: 10 TABLET, FILM COATED ORAL at 08:07

## 2024-05-22 RX ADMIN — CETIRIZINE HYDROCHLORIDE 10 MG: 10 TABLET ORAL at 08:07

## 2024-05-22 RX ADMIN — SPIRONOLACTONE 100 MG: 100 TABLET ORAL at 08:07

## 2024-05-22 RX ADMIN — PIPERACILLIN AND TAZOBACTAM 3.38 G: 3; .375 INJECTION, POWDER, FOR SOLUTION INTRAVENOUS at 16:36

## 2024-05-22 RX ADMIN — AMITRIPTYLINE HYDROCHLORIDE 20 MG: 10 TABLET, FILM COATED ORAL at 20:43

## 2024-05-22 RX ADMIN — IPRATROPIUM BROMIDE AND ALBUTEROL SULFATE 3 ML: .5; 3 SOLUTION RESPIRATORY (INHALATION) at 20:07

## 2024-05-22 NOTE — PLAN OF CARE
Goal Outcome Evaluation:   VSS. A&ox4. 02 @5LPM. PRN cough medication given. Continues on IV abt. CT clamped. No c/o SOA. Up x1 assist. Call light in reach.

## 2024-05-22 NOTE — PLAN OF CARE
Goal Outcome Evaluation:  Plan of Care Reviewed With: patient        Progress: improving  Outcome Evaluation: Pt. seen this PM for OT session, chest tube removed this date, pt. reports she is feeling encouraged and willing to participate. Pt. continues to demo decreased act mabel and strength however improving from previous session. VSS during activity pt was able to perform grooming in standing at the sink before returning to bedside to complete BUE ther ex. Pt. will continue to benefit from skilled OT services and OT to follow and progress as able      Anticipated Discharge Disposition (OT): home with assist, home with home health

## 2024-05-22 NOTE — THERAPY TREATMENT NOTE
Patient Name: Che Landaverde  : 1950    MRN: [id number]                              Today's Date: 2024       Admit Date: 2024    Visit Dx:     ICD-10-CM ICD-9-CM   1. Pneumonia  J18.9 486     Patient Active Problem List   Diagnosis    Gastroesophageal reflux disease    Hoarseness    Essential hypertension    Migraine    Dupuytren's contracture    Mixed simple and mucopurulent chronic bronchitis    Type 2 diabetes mellitus with hyperglycemia, without long-term current use of insulin    Chronic left-sided low back pain without sciatica    Seasonal allergies    PAD (peripheral artery disease)    Celiac disease    Pneumothorax     Past Medical History:   Diagnosis Date    Aggressive former smoker     Allergic rhinitis     Anxiety     Asthma     Atherosclerotic PVD with intermittent claudication 2022    Benign essential hypertension     Chronic obstructive pulmonary disease, unspecified COPD type 2022    Cough     Diabetes mellitus     Diarrhea     Encounter for screening for lung cancer     GERD (gastroesophageal reflux disease)     Hip arthrosis     Internal hemorrhoids     Laceration of skin of lower leg     Lung nodule     Microscopic colitis     Migraine, unspecified, not intractable, without status migrainosus     Need for influenza vaccination     Need for pneumococcal vaccination     Osteoporosis     Painful hip     Pleural effusion 2020    Reactive airway disease with wheezing with acute exacerbation     Seasonal allergies     Sleep disturbances     Smoking history     Varicose veins of bilateral lower extremities with other complications 2022    Venous insufficiency (chronic) (peripheral) 2022    Venous ulcer 2022     Past Surgical History:   Procedure Laterality Date    BRAVO PROCEDURE N/A 2016    Procedure: ESOPHAGOGASTRODUODENOSCOPY AND BRAVO ;  Surgeon: Laila Trevino MD;  Location: [facility] ENDOSCOPY;  Service:     BRONCHOSCOPY N/A 2024     Procedure: BRONCHOSCOPY WITH FLUORO with BAL and biopsy;  Surgeon: Ramon Edwards MD;  Location: [facility] ENDOSCOPY;  Service: [employer] - Pulmonary;  Laterality: N/A;  pre: pneumonia  post: same    CATARACT EXTRACTION  07/13/2015    COLONOSCOPY  03/06/2015    ih 3/15 Dr. Trevino    SIGMOIDOSCOPY N/A 06/07/2016    Procedure: SIGMOIDOSCOPY FLEXIBLE to transverse colon with biopsy;  Surgeon: Laila Trevino MD;  Location: [facility] ENDOSCOPY;  Service:       General Information       Row Name 05/22/24 1450          OT Time and Intention    Document Type therapy note (daily note)  -AG     Mode of Treatment individual therapy;occupational therapy  -AG       Row Name 05/22/24 1450          General Information    Existing Precautions/Restrictions fall;oxygen therapy device and L/min  3L O2  -AG       Row Name 05/22/24 1450          Cognition    Orientation Status (Cognition) oriented x 4  -AG       Row Name 05/22/24 1450          Safety Issues, Functional Mobility    Impairments Affecting Function (Mobility) endurance/activity tolerance;shortness of breath  -AG               User Key  (r) = Recorded By, (t) = Taken By, (c) = Cosigned By      Initials Name Provider Type    AG Otis Francois OT Occupational Therapist                     Mobility/ADL's       Row Name 05/22/24 1451          Bed Mobility    Bed Mobility supine-sit;sit-supine  -AG     Supine-Sit [place] (Bed Mobility) supervision  -AG     Sit-Supine [place] (Bed Mobility) supervision  -AG     Assistive Device (Bed Mobility) bed rails  -AG       Row Name 05/22/24 1451          Transfers    Transfers sit-stand transfer  -AG       Row Name 05/22/24 1451          Sit-Stand Transfer    Sit-Stand [place] (Transfers) contact guard  -AG     Assistive Device (Sit-Stand Transfers) walker, front-wheeled  -AG     Comment, (Sit-Stand Transfer) 2x STS from EOB  -AG       Row Name 05/22/24 1451          Functional Mobility    Functional Mobility- Comment short  distance in room w RW  -       Row Name 05/22/24 1451          Activities of Daily Living    BADL Assessment/Intervention grooming  -[facility] Name 05/22/24 1451          Grooming Assessment/Training    [place] Level (Grooming) grooming skills;hair care, combing/brushing;wash face, hands;set up  -AG     Position (Grooming) sink side;supported standing  -AG               User Key  (r) = Recorded By, (t) = Taken By, (c) = Cosigned By      Initials Name Provider Type     Otis Francois OT Occupational Therapist                   Obj/Interventions       [facility] Name 05/22/24 1452          Shoulder (Therapeutic Exercise)    Shoulder (Therapeutic Exercise) AROM (active range of motion)  -     Shoulder AROM (Therapeutic Exercise) bilateral;flexion;scapular retraction;scapular protraction;sitting;20 repititions  -AG       Row Name 05/22/24 1452          Elbow/Forearm (Therapeutic Exercise)    Elbow/Forearm (Therapeutic Exercise) AROM (active range of motion)  -     Elbow/Forearm AROM (Therapeutic Exercise) bilateral;flexion;extension;20 repititions;sitting  -AG       Row Name 05/22/24 1452          Motor Skills    Therapeutic Exercise shoulder;elbow/forearm  -AG       Row Name 05/22/24 1452          Balance    Balance Assessment sitting static balance;sitting dynamic balance;sit to stand dynamic balance;standing static balance;standing dynamic balance  -AG     Static Sitting Balance independent  -AG     Dynamic Sitting Balance standby assist  -AG     Position, Sitting Balance unsupported;sitting edge of bed  -AG     Static Standing Balance contact guard  -AG     Dynamic Standing Balance contact guard  -AG     Position/Device Used, Standing Balance supported;walker, front-wheeled  -AG     Balance Interventions sitting;standing;occupation based/functional task  -               User Key  (r) = Recorded By, (t) = Taken By, (c) = Cosigned By      Initials Name Provider Type    AG Otis Francois, OT  Occupational Therapist                   Goals/Plan    No documentation.                  Clinical Impression       Row Name 05/22/24 1452          Pain Assessment    Pretreatment Pain Rating 0/10 - no pain  -AG     Posttreatment Pain Rating 0/10 - no pain  -AG       Row Name 05/22/24 1452          Plan of Care Review    Plan of Care Reviewed With patient  -AG     Progress improving  -AG     Outcome Evaluation Pt. seen this PM for OT session, chest tube removed this date, pt. reports she is feeling encouraged and willing to participate. Pt. continues to demo decreased act mabel and strength however improving from previous session. VSS during activity pt was able to perform grooming in standing at the sink before returning to bedside to complete BUE ther ex. Pt. will continue to benefit from skilled OT services and OT to follow and progress as able  -AG       Row Name 05/22/24 1451          Therapy Assessment/Plan (OT)    Rehab Potential (OT) good, to achieve stated therapy goals  -AG     Criteria for Skilled Therapeutic Interventions Met (OT) yes;skilled treatment is necessary  -AG     Therapy Frequency (OT) 5 times/wk  -AG       Row Name 05/22/24 1456          Therapy Plan Review/Discharge Plan (OT)    Anticipated Discharge Disposition (OT) home with assist;home with home health  -AG       Row Name 05/22/24 1451          Vital Signs    Pre SpO2 (%) 97  -AG     O2 Delivery Pre Treatment supplemental O2  -AG     Intra SpO2 (%) 89  -AG     O2 Delivery Intra Treatment supplemental O2  -AG     Post SpO2 (%) 96  -AG     O2 Delivery Post Treatment supplemental O2  -AG     Pre Patient Position Supine  -AG     Intra Patient Position Standing  -AG     Post Patient Position Supine  -AG       Row Name 05/22/24 1451          Positioning and Restraints    Pre-Treatment Position in bed  -AG     Post Treatment Position bed  -AG     In Bed notified nsg;fowlers;call light within reach;encouraged to call for assist;exit alarm on  -AG                User Key  (r) = Recorded By, (t) = Taken By, (c) = Cosigned By      Initials Name Provider Type    Otis Yuan OT Occupational Therapist                   Outcome Measures       Row Name 05/22/24 1455          How much help from another is currently needed...    Putting on and taking off regular lower body clothing? 3  -AG     Bathing (including washing, rinsing, and drying) 3  -AG     Putting on and taking off regular upper body clothing 3  -AG     Taking care of personal grooming (such as brushing teeth) 3  -AG     Eating meals 4  -AG       Row Name 05/22/24 0900          How much help from another person do you currently need...    Turning from your back to your side while in flat bed without using bedrails? 3  -KP     Moving from lying on back to sitting on the side of a flat bed without bedrails? 3  -KP     Moving to and from a bed to a chair (including a wheelchair)? 3  -KP     Standing up from a chair using your arms (e.g., wheelchair, bedside chair)? 3  -KP     Climbing 3-5 steps with a railing? 3  -KP     To walk in hospital room? 3  -KP     AM-PAC 6 Clicks Score (PT) 18  -KP     Highest Level of Mobility Goal 6 --> Walk 10 steps or more  -KP       Row Name 05/22/24 1450          Functional Assessment    Outcome Measure Options AM-PAC 6 Clicks Daily Activity (OT)  -AG               User Key  (r) = Recorded By, (t) = Taken By, (c) = Cosigned By      Initials Name Provider Type    Otis Yuan OT Occupational Therapist    Bianca Freedman RN Registered Nurse                    Occupational Therapy Education       Title: PT OT SLP Therapies (In Progress)       Topic: Occupational Therapy (In Progress)       Point: ADL training (Done)       Description:   Instruct learner(s) on proper safety adaptation and remediation techniques during self care or transfers.   Instruct in proper use of assistive devices.                  Learning Progress Summary             Patient Acceptance,  E, VU by  at 5/21/2024 1240                         Point: Home exercise program (Not Started)       Description:   Instruct learner(s) on appropriate technique for monitoring, assisting and/or progressing therapeutic exercises/activities.                  Learner Progress:  Not documented in this visit.              Point: Precautions (Done)       Description:   Instruct learner(s) on prescribed precautions during self-care and functional transfers.                  Learning Progress Summary             Patient Acceptance, E, VU by  at 5/21/2024 1240                         Point: Body mechanics (Done)       Description:   Instruct learner(s) on proper positioning and spine alignment during self-care, functional mobility activities and/or exercises.                  Learning Progress Summary             Patient Acceptance, E, VU by  at 5/21/2024 1240                                         User Key       Initials Effective Dates Name Provider Type Discipline     06/10/21 -  Aaliyah Loredo OT Occupational Therapist OT                  OT Recommendation and Plan  Therapy Frequency (OT): 5 times/wk  Plan of Care Review  Plan of Care Reviewed With: patient  Progress: improving  Outcome Evaluation: Pt. seen this PM for OT session, chest tube removed this date, pt. reports she is feeling encouraged and willing to participate. Pt. continues to demo decreased act mabel and strength however improving from previous session. VSS during activity pt was able to perform grooming in standing at the sink before returning to bedside to complete BUE ther ex. Pt. will continue to benefit from skilled OT services and OT to follow and progress as able     Time Calculation:         Time Calculation- OT       Row Name 05/22/24 1455             Time Calculation- OT    OT Start Time 1326  -AG      OT Stop Time 1346  -AG      OT Time Calculation (min) 20 min  -AG      Total Timed Code Minutes- OT 20 minute(s)  -AG      OT Received On  05/22/24  -AG      OT - Next Appointment 05/23/24  -AG      OT Goal Re-Cert Due Date 06/04/24  -AG         Timed Charges    26357 - OT Therapeutic Exercise Minutes 10  -AG      58609 - OT Self Care/Mgmt Minutes 10  -AG         Total Minutes    Timed Charges Total Minutes 20  -AG       Total Minutes 20  -AG                User Key  (r) = Recorded By, (t) = Taken By, (c) = Cosigned By      Initials Name Provider Type     Otis Francois OT Occupational Therapist                  Therapy Charges for Today       Code Description Service Date Service Provider Modifiers Qty    [id number]  OT THER PROC EA 15 MIN 5/22/2024 Otis Francois OT GO 1                 Otis Francois OT  5/22/2024

## 2024-05-22 NOTE — PLAN OF CARE
Problem: Adult Inpatient Plan of Care  Goal: Plan of Care Review  5/22/2024 1727 by Bianca Lucas RN  Outcome: Ongoing, Progressing  Flowsheets  Taken 5/22/2024 1727 by Bianca Lucas RN  Plan of Care Reviewed With: patient  Outcome Evaluation: Pt Alert and oriented. Chest tube removed. Antibiotics given. Will continue supportive care  Taken 5/22/2024 1452 by Otis Francois OT  Progress: improving  5/22/2024 1727 by Bianca Lucas RN  Outcome: Ongoing, Progressing  Flowsheets (Taken 5/22/2024 1727)  Plan of Care Reviewed With: patient  Outcome Evaluation: Pt Alert and oriented. Chest tube removed. Antibiotics given. Will continue supportive care   Goal Outcome Evaluation:

## 2024-05-22 NOTE — PROGRESS NOTES
Daily Progress Note  [address]  9/23/2023    Patient Name:  Che Landaverde  MRN:  [id number]   YOB: 1950  Age: 73 y.o.  Sex: female  LOS: 6    Reason for admission:  Pneumothorax, COPD    Interval History:  No acute events overnight  Wants to be able to get out of bed and go home  Breathing is stable  On 5 L nasal cannula  No chest pain or palpitations  Cough with some sputum production, having difficulty bringing it up  Tentative plan for chest tube removal today    Physical Exam:  Vitals:    05/22/24 0700   BP: 143/74   Pulse: 89   Resp: 16   Temp: 97.3 °F (36.3 °C)   SpO2: 96%       Intake/Output    Intake/Output Summary (Last 24 hours) at 5/22/2024 0859  Last data filed at 5/21/2024 1700  Gross per 24 hour   Intake 720 ml   Output 0 ml   Net 720 ml     General: Alert, nontoxic, NAD  HEENT: NC/AT, EOMI, MMM  Neck: Supple, trachea midline  Cardiac: RRR, no murmur, gallops, rubs  Chest: Chest tube in place  Pulmonary: Coarse bilaterally  GI: Soft, non-tender, non-distended, normal bowel sounds  Extremities: Warm, well perfused, no LE edema  Skin: no visible rash  Neuro: CN II - XII grossly intact  Psychiatry: Normal mood and affect    Data Review:  Results from last 7 days   Lab Units 05/22/24  0343 05/21/24  1000 05/20/24  0426 05/16/24  1755   WBC 10*3/mm3 14.59* 17.94* 16.10* 17.53*   HEMOGLOBIN g/dL 10.0* 10.4* 10.4* 10.8*   PLATELETS 10*3/mm3 705* 750* 661* 461*     Results from last 7 days   Lab Units 05/22/24  0343 05/21/24  1000 05/20/24  0426 05/16/24  1756   SODIUM mmol/L 135* 131* 135* 134*   POTASSIUM mmol/L 4.3 4.5 3.9 4.3   CHLORIDE mmol/L 96* 94* 94* 96*   CO2 mmol/L 27.2 27.0 30.0* 24.0   BUN mg/dL 9 8 9 9   CREATININE mg/dL 0.61 0.62 0.54* 0.62   GLUCOSE mg/dL 110* 128* 100* 182*   CALCIUM mg/dL 9.2 9.3 8.8 8.9   MAGNESIUM mg/dL 2.0 2.0  --   --    PHOSPHORUS mg/dL 5.1* 3.2  --   --    Estimated Creatinine Clearance: 91.8 mL/min (by C-G formula based on SCr  of 0.61 mg/dL).    Results from last 7 days   Lab Units 05/22/24  0343 05/21/24  1000 05/20/24  0426 05/18/24  2331 05/16/24  1756   PROCALCITONIN ng/mL  --  0.10  --   --  <0.02   D DIMER QUANT MCGFEU/mL  --   --   --  2.80*  --    PLATELETS 10*3/mm3 705* 750* 661*  --   --        Results from last 7 days   Lab Units 05/18/24  2338   PH, ARTERIAL pH units 7.384   PCO2, ARTERIAL mm Hg 48.0*   PO2 ART mm Hg 79.6*   HCO3 ART mmol/L 28.6*         Imaging:  Reviewed chest images personally from past 3 days    ASSESSMENT  /  PLAN:    Iatrogenic left pneumothorax  Bilateral pulmonary infiltrates/nodules, s/p bronch with TBB and BAL 5/16/2024.  Path normal  Rhinovirus infection  Superimposed bacterial pneumonia  COPD exacerbation due to viral illness  Acute hypoxic respiratory failure  Stress-induced leukocytosis    -Respiratory status is stable wean oxygen as tolerated for SpO2 goal greater than 90%.  Weaned from 5 L to 3 L with appropriate saturations.  Walking oximetry study tomorrow.  -Continue empiric antibiotics for superimposed bacterial pneumonia, will complete a 5-day course.  -Chest x-ray appears stable, tentative plan for chest tube removal today with thoracic surgery.  -Continue bronchodilator therapy with Symbicort and Spiriva.  -Albuterol nebulizer as needed.  -PT recommends home with home health  -Patient will need repeat imaging in 6 to 8 weeks after discharge and follow-up with Dr. Edwards to ensure resolution of pneumonia.    Disposition: Floor.  Tentative plan for discharge tomorrow..    Jaylon Frazier MD  [facility]  Pulmonary and Critical Care Medicine, Interventional Pulmonology    Parts of this note may be an electronic transcription/translation of spoken language to printed text using the Dragon dictation system.

## 2024-05-22 NOTE — PROGRESS NOTES
"    Chief Complaint: Pneumothorax  S/P: Chest tube placement      Subjective:  Symptoms:  Improved.  She reports cough (Productive, improved).  No shortness of breath.  Chest pain: tenderness around chest tube..  Diet:  Adequate intake.  No nausea or vomiting.    Activity level: Returning to normal.    Pain:  She complains of pain that is mild.  Pain is well controlled.    No new complaints.  Having some discomfort from chest tube.  Still with productive cough although improved compared to yesterday.    Vital Signs:  Temp:  [97.3 °F (36.3 °C)-98.6 °F (37 °C)] 97.9 °F (36.6 °C)  Heart Rate:  [] 84  Resp:  [16-18] 16  BP: (114-146)/(66-99) 143/74    Intake & Output (last day)         05/21 0701  05/22 0700 05/22 0701  05/23 0700    P.O. 720 480    Total Intake(mL/kg) 720 (9.9) 480 (6.6)    Urine (mL/kg/hr) 0 (0)     Stool 0     Chest Tube 0     Total Output 0     Net +720 +480          Urine Unmeasured Occurrence 3 x     Stool Unmeasured Occurrence 1 x             Objective:  General Appearance:  Comfortable, well-appearing and in no acute distress.    Vital signs: (most recent): Blood pressure 143/74, pulse 84, temperature 97.9 °F (36.6 °C), temperature source Oral, resp. rate 16, height 170.2 cm (67\"), weight 73 kg (161 lb), SpO2 92%, not currently breastfeeding.  Vital signs are normal.    Output: Producing urine.    Lungs:  Normal effort.  There are decreased breath sounds.    Chest: Symmetric chest wall expansion. Chest wall tenderness (Around chest tube) present.    Neurological: Patient is alert and oriented to person, place and time.    Skin:  Warm and dry.                Chest tube:   Site: Left, Clean, Dry, and Intact  Suction: Clamped  Air Leak: Negative   24 Hour Total: None documented    Results Review:     I reviewed the patient's new clinical results.  I reviewed the patient's new imaging results and agree with the interpretation.  I reviewed the patient's other test results and agree with the " interpretation    Imaging Results (Last 24 Hours)       Procedure Component Value Units Date/Time    XR Chest 1 View [[id number]] Collected: 05/22/24 0856     Updated: 05/22/24 0904    Narrative:      XR CHEST 1 VW-     INDICATION: Chest tube management     COMPARISON: Chest radiographs dating back to 8/25/2020. CT chest  5/20/2024 and 5/9/2024       Impression:      Left-sided pleural pigtail drainage catheter. No  pneumothorax or pleural effusion. Unchanged patchy patchy opacities in  the right middle and right greater than left bilateral lower lobes.  Stable normal size cardiomediastinal silhouette. No focal osseous  abnormality.     This report was finalized on 5/22/2024 9:01 AM by Dr. Peter Lawrence M.D on Workstation: [id number]       CT Guided Chest Tube [[id number]] Collected: 05/17/24 1516     Updated: 05/22/24 0843    Narrative:      CT-GUIDED LEFT CHEST TUBE PLACEMENT 05/17/2024     HISTORY: Left pneumothorax.     After signed informed consent was obtained, patient was prepped and  draped in the supine position. Lidocaine was used for local anesthesia.     18-gauge needle was introduced into the pleural space on the left. An  0.018 wire was placed. This was followed by placement of an 8-Croatian  dilator and followed by placement of a 10-Croatian pigtail catheter.  Catheter was connected to the atrium suction. Postprocedure images show  resolution of the left pneumothorax. The catheter was sutured in place  and left to the atrium suction.     Patient tolerated the procedure well with no complications.     Moderate sedation was provided under my direct supervision using 1 mg IV  Versed and 25 mcg IV Fentanyl. The patient was independently monitored  by a trained Department of Radiology RN using automated blood pressure,  EKG, and pulse oximetry. My total intra-service time was 14 minutes.      Radiation dose reduction techniques were utilized, including automated  exposure control and exposure modulation  based on body size.        This report was finalized on 5/22/2024 8:40 AM by Dr. Karthikeyan Patiño M.D on Workstation: [id number]               Lab Results:     Lab Results (last 24 hours)       Procedure Component Value Units Date/Time    Phosphorus [[id number]]  (Abnormal) Collected: 05/22/24 0343    Specimen: Blood Updated: 05/22/24 0503     Phosphorus 5.1 mg/dL     Magnesium [[id number]]  (Normal) Collected: 05/22/24 0343    Specimen: Blood Updated: 05/22/24 0500     Magnesium 2.0 mg/dL     Basic Metabolic Panel [[id number]]  (Abnormal) Collected: 05/22/24 0343    Specimen: Blood Updated: 05/22/24 0500     Glucose 110 mg/dL      BUN 9 mg/dL      Creatinine 0.61 mg/dL      Sodium 135 mmol/L      Potassium 4.3 mmol/L      Chloride 96 mmol/L      CO2 27.2 mmol/L      Calcium 9.2 mg/dL      BUN/Creatinine Ratio 14.8     Anion Gap 11.8 mmol/L      eGFR 93.4 mL/min/1.73     Narrative:      GFR Normal >60  Chronic Kidney Disease <60  Kidney Failure <15    The GFR formula is only valid for adults with stable renal function between ages 18 and 70.    CBC & Differential [[id number]]  (Abnormal) Collected: 05/22/24 0343    Specimen: Blood Updated: 05/22/24 0443    Narrative:      The following orders were created for panel order CBC & Differential.  Procedure                               Abnormality         Status                     ---------                               -----------         ------                     CBC Auto Differential[[id number]]        Abnormal            Final result                 Please view results for these tests on the individual orders.    CBC Auto Differential [[id number]]  (Abnormal) Collected: 05/22/24 0343    Specimen: Blood Updated: 05/22/24 0443     WBC 14.59 10*3/mm3      RBC 3.56 10*6/mm3      Hemoglobin 10.0 g/dL      Hematocrit 31.6 %      MCV 88.8 fL      MCH 28.1 pg      MCHC 31.6 g/dL      RDW 13.7 %      RDW-SD 44.6 fl      MPV 9.2 fL      Platelets 705 10*3/mm3      Neutrophil %  64.2 %      Lymphocyte % 17.5 %      Monocyte % 13.1 %      Eosinophil % 2.5 %      Basophil % 0.4 %      Immature Grans % 2.3 %      Neutrophils, Absolute 9.37 10*3/mm3      Lymphocytes, Absolute 2.56 10*3/mm3      Monocytes, Absolute 1.91 10*3/mm3      Eosinophils, Absolute 0.36 10*3/mm3      Basophils, Absolute 0.06 10*3/mm3      Immature Grans, Absolute 0.33 10*3/mm3      nRBC 0.0 /100 WBC     Fungus Culture - Tissue, Lung, Lingula [[id number]] Collected: 05/16/24 1331    Specimen: Tissue from Lung, Lingula Updated: 05/21/24 1431     Fungus Culture No fungus isolated at less than 1 week    AFB Culture - Tissue, Lung, Lingula [[id number]] Collected: 05/16/24 1331    Specimen: Tissue from Lung, Lingula Updated: 05/21/24 1431     AFB Culture No AFB isolated at less than 1 week     AFB Stain No acid fast bacilli seen on direct smear    Fungus Culture - Lavage, Lung, Right Middle Lobe [[id number]] Collected: 05/16/24 1327    Specimen: Lavage from Lung, Right Middle Lobe Updated: 05/21/24 1400     Fungus Culture No fungus isolated at less than 1 week    AFB Culture - Lavage, Lung, Right Middle Lobe [[id number]] Collected: 05/16/24 1327    Specimen: Lavage from Lung, Right Middle Lobe Updated: 05/21/24 1400     AFB Culture No AFB isolated at less than 1 week     AFB Stain No acid fast bacilli seen on concentrated smear             Assessment & Plan       Pneumothorax       Assessment & Plan  Ms. Main is a pleasant 73-year-old lady with a pneumothorax s/p chest tube placement as well as pneumonia.      Improving oxygen requirements presently on 3 L via nasal cannula.  Chest tube remained clamped overnight.  Follow-up chest x-ray performed today demonstrates no pneumothorax therefore the chest tube was removed without difficulty.  Discussed site care and restrictions with the patient.  Will repeat x-ray in the morning.  Encourage pulmonary hygiene.  Increase activity, wean oxygen as able.    KARINE Jasso  [facility]  05/22/24  13:33 EDT

## 2024-05-23 ENCOUNTER — DOCUMENTATION (OUTPATIENT)
Dept: HOME HEALTH SERVICES | Facility: HOME HEALTHCARE | Age: 74
End: 2024-05-23
Payer: MEDICARE

## 2024-05-23 ENCOUNTER — HOME HEALTH ADMISSION (OUTPATIENT)
Dept: HOME HEALTH SERVICES | Facility: HOME HEALTHCARE | Age: 74
End: 2024-05-23
Payer: COMMERCIAL

## 2024-05-23 ENCOUNTER — READMISSION MANAGEMENT (OUTPATIENT)
Dept: CALL CENTER | Facility: HOSPITAL | Age: 74
End: 2024-05-23
Payer: MEDICARE

## 2024-05-23 ENCOUNTER — APPOINTMENT (OUTPATIENT)
Dept: GENERAL RADIOLOGY | Facility: HOSPITAL | Age: 74
End: 2024-05-23
Payer: MEDICARE

## 2024-05-23 VITALS
OXYGEN SATURATION: 98 % | BODY MASS INDEX: 25.27 KG/M2 | WEIGHT: 161 LBS | HEIGHT: 67 IN | SYSTOLIC BLOOD PRESSURE: 126 MMHG | DIASTOLIC BLOOD PRESSURE: 66 MMHG | RESPIRATION RATE: 16 BRPM | TEMPERATURE: 97.1 F | HEART RATE: 91 BPM

## 2024-05-23 DIAGNOSIS — B34.8 RHINOVIRUS INFECTION: ICD-10-CM

## 2024-05-23 DIAGNOSIS — J18.9 PNEUMONIA DUE TO INFECTIOUS ORGANISM, UNSPECIFIED LATERALITY, UNSPECIFIED PART OF LUNG: Primary | ICD-10-CM

## 2024-05-23 PROBLEM — J93.9 PNEUMOTHORAX: Status: RESOLVED | Noted: 2024-05-16 | Resolved: 2024-05-23

## 2024-05-23 LAB
ANION GAP SERPL CALCULATED.3IONS-SCNC: 13 MMOL/L (ref 5–15)
BASOPHILS # BLD AUTO: 0.06 10*3/MM3 (ref 0–0.2)
BASOPHILS NFR BLD AUTO: 0.4 % (ref 0–1.5)
BUN SERPL-MCNC: 8 MG/DL (ref 8–23)
BUN/CREAT SERPL: 11.4 (ref 7–25)
CALCIUM SPEC-SCNC: 9 MG/DL (ref 8.6–10.5)
CHLORIDE SERPL-SCNC: 99 MMOL/L (ref 98–107)
CO2 SERPL-SCNC: 26 MMOL/L (ref 22–29)
CREAT SERPL-MCNC: 0.7 MG/DL (ref 0.57–1)
DEPRECATED RDW RBC AUTO: 41.3 FL (ref 37–54)
EGFRCR SERPLBLD CKD-EPI 2021: 90.3 ML/MIN/1.73
EOSINOPHIL # BLD AUTO: 0.65 10*3/MM3 (ref 0–0.4)
EOSINOPHIL NFR BLD AUTO: 4.5 % (ref 0.3–6.2)
ERYTHROCYTE [DISTWIDTH] IN BLOOD BY AUTOMATED COUNT: 13.3 % (ref 12.3–15.4)
FUNGUS WND CULT: NORMAL
GLUCOSE SERPL-MCNC: 105 MG/DL (ref 65–99)
HCT VFR BLD AUTO: 31.5 % (ref 34–46.6)
HGB BLD-MCNC: 10.3 G/DL (ref 12–15.9)
IMM GRANULOCYTES # BLD AUTO: 0.4 10*3/MM3 (ref 0–0.05)
IMM GRANULOCYTES NFR BLD AUTO: 2.8 % (ref 0–0.5)
LYMPHOCYTES # BLD AUTO: 2.79 10*3/MM3 (ref 0.7–3.1)
LYMPHOCYTES NFR BLD AUTO: 19.2 % (ref 19.6–45.3)
MAGNESIUM SERPL-MCNC: 2.2 MG/DL (ref 1.6–2.4)
MCH RBC QN AUTO: 28.4 PG (ref 26.6–33)
MCHC RBC AUTO-ENTMCNC: 32.7 G/DL (ref 31.5–35.7)
MCV RBC AUTO: 86.8 FL (ref 79–97)
MONOCYTES # BLD AUTO: 1.86 10*3/MM3 (ref 0.1–0.9)
MONOCYTES NFR BLD AUTO: 12.8 % (ref 5–12)
MYCOBACTERIUM SPEC CULT: NORMAL
MYCOBACTERIUM SPEC CULT: NORMAL
NEUTROPHILS NFR BLD AUTO: 60.3 % (ref 42.7–76)
NEUTROPHILS NFR BLD AUTO: 8.74 10*3/MM3 (ref 1.7–7)
NIGHT BLUE STAIN TISS: NORMAL
NIGHT BLUE STAIN TISS: NORMAL
NRBC BLD AUTO-RTO: 0 /100 WBC (ref 0–0.2)
PHOSPHATE SERPL-MCNC: 4 MG/DL (ref 2.5–4.5)
PLATELET # BLD AUTO: 763 10*3/MM3 (ref 140–450)
PMV BLD AUTO: 8.9 FL (ref 6–12)
POTASSIUM SERPL-SCNC: 4.4 MMOL/L (ref 3.5–5.2)
RBC # BLD AUTO: 3.63 10*6/MM3 (ref 3.77–5.28)
SODIUM SERPL-SCNC: 138 MMOL/L (ref 136–145)
WBC NRBC COR # BLD AUTO: 14.5 10*3/MM3 (ref 3.4–10.8)

## 2024-05-23 PROCEDURE — 94664 DEMO&/EVAL PT USE INHALER: CPT

## 2024-05-23 PROCEDURE — 94799 UNLISTED PULMONARY SVC/PX: CPT

## 2024-05-23 PROCEDURE — 97530 THERAPEUTIC ACTIVITIES: CPT

## 2024-05-23 PROCEDURE — 25010000002 ONDANSETRON PER 1 MG: Performed by: INTERNAL MEDICINE

## 2024-05-23 PROCEDURE — 85025 COMPLETE CBC W/AUTO DIFF WBC: CPT | Performed by: HOSPITALIST

## 2024-05-23 PROCEDURE — 94760 N-INVAS EAR/PLS OXIMETRY 1: CPT

## 2024-05-23 PROCEDURE — 83735 ASSAY OF MAGNESIUM: CPT | Performed by: HOSPITALIST

## 2024-05-23 PROCEDURE — 25010000002 PIPERACILLIN SOD-TAZOBACTAM PER 1 G: Performed by: HOSPITALIST

## 2024-05-23 PROCEDURE — 99232 SBSQ HOSP IP/OBS MODERATE 35: CPT

## 2024-05-23 PROCEDURE — 80048 BASIC METABOLIC PNL TOTAL CA: CPT | Performed by: HOSPITALIST

## 2024-05-23 PROCEDURE — 94761 N-INVAS EAR/PLS OXIMETRY MLT: CPT

## 2024-05-23 PROCEDURE — 84100 ASSAY OF PHOSPHORUS: CPT | Performed by: HOSPITALIST

## 2024-05-23 PROCEDURE — 71045 X-RAY EXAM CHEST 1 VIEW: CPT

## 2024-05-23 RX ORDER — AMOXICILLIN AND CLAVULANATE POTASSIUM 875; 125 MG/1; MG/1
1 TABLET, FILM COATED ORAL 2 TIMES DAILY
Qty: 6 TABLET | Refills: 0 | Status: SHIPPED | OUTPATIENT
Start: 2024-05-24

## 2024-05-23 RX ADMIN — PANTOPRAZOLE SODIUM 40 MG: 40 TABLET, DELAYED RELEASE ORAL at 05:12

## 2024-05-23 RX ADMIN — CETIRIZINE HYDROCHLORIDE 10 MG: 10 TABLET ORAL at 08:29

## 2024-05-23 RX ADMIN — IPRATROPIUM BROMIDE AND ALBUTEROL SULFATE 3 ML: .5; 3 SOLUTION RESPIRATORY (INHALATION) at 14:29

## 2024-05-23 RX ADMIN — CITALOPRAM 20 MG: 20 TABLET, FILM COATED ORAL at 08:29

## 2024-05-23 RX ADMIN — PIPERACILLIN AND TAZOBACTAM 3.38 G: 3; .375 INJECTION, POWDER, FOR SOLUTION INTRAVENOUS at 08:30

## 2024-05-23 RX ADMIN — IPRATROPIUM BROMIDE AND ALBUTEROL SULFATE 3 ML: .5; 3 SOLUTION RESPIRATORY (INHALATION) at 06:43

## 2024-05-23 RX ADMIN — BUDESONIDE AND FORMOTEROL FUMARATE DIHYDRATE 2 PUFF: 160; 4.5 AEROSOL RESPIRATORY (INHALATION) at 06:44

## 2024-05-23 RX ADMIN — METOPROLOL TARTRATE 25 MG: 25 TABLET, FILM COATED ORAL at 08:29

## 2024-05-23 RX ADMIN — MONTELUKAST SODIUM 10 MG: 10 TABLET, FILM COATED ORAL at 08:30

## 2024-05-23 RX ADMIN — PIPERACILLIN AND TAZOBACTAM 3.38 G: 3; .375 INJECTION, POWDER, FOR SOLUTION INTRAVENOUS at 00:26

## 2024-05-23 RX ADMIN — IPRATROPIUM BROMIDE AND ALBUTEROL SULFATE 3 ML: .5; 3 SOLUTION RESPIRATORY (INHALATION) at 11:06

## 2024-05-23 RX ADMIN — SPIRONOLACTONE 100 MG: 100 TABLET ORAL at 08:30

## 2024-05-23 RX ADMIN — ONDANSETRON 4 MG: 2 INJECTION INTRAMUSCULAR; INTRAVENOUS at 08:30

## 2024-05-23 RX ADMIN — GUAIFENESIN 1200 MG: 600 TABLET, EXTENDED RELEASE ORAL at 08:29

## 2024-05-23 RX ADMIN — ROSUVASTATIN CALCIUM 10 MG: 10 TABLET, FILM COATED ORAL at 08:29

## 2024-05-23 RX ADMIN — CILOSTAZOL 50 MG: 100 TABLET ORAL at 08:29

## 2024-05-23 RX ADMIN — Medication 10 ML: at 08:31

## 2024-05-23 RX ADMIN — BENZONATATE 200 MG: 100 CAPSULE ORAL at 08:28

## 2024-05-23 RX ADMIN — DIAZEPAM 2 MG: 2 TABLET ORAL at 00:38

## 2024-05-23 NOTE — CASE MANAGEMENT/SOCIAL WORK
Continued Stay Note  [facility]     Patient Name: Che Landaverde  MRN: [id number]  Today's Date: 5/23/2024    Admit Date: 5/16/2024    Plan: Home with [facility]    Discharge Plan       Row Name 05/23/24 3330       Plan    Plan Home with [facility]     Patient/Family in Agreement with Plan yes    Plan Comments Received call from family with concerns about pts mobility. Pt worked with PT this afternoon and did very well, walking 90ft. Discussed again with pt and family and the plan remained home with family and HH. Discharge orders entered, 6 minute walk complete. Called [facility] to let her know pt is discharging tonight. Orders entered for oxygen and rollator per Dr Frazier's request. No further needs at this time. CCP following. RICARDO Ramirez RN                   Discharge Codes    No documentation.                 Expected Discharge Date and Time       Expected Discharge Date Expected Discharge Time    May 23, 2024               Federico Nuñez RN

## 2024-05-23 NOTE — PROGRESS NOTES
"    Chief Complaint: Pneumothorax  S/P: Chest tube placement    Subjective:  Symptoms:  Improved.  She reports cough (Productive, improved).  No shortness of breath.  Chest pain: tenderness around chest tube..  Diet:  Adequate intake.  No nausea or vomiting.    Activity level: Returning to normal.    Pain:  She complains of pain that is mild.  Pain is well controlled.    No new complaints.  Denies any dyspnea.    Vital Signs:  Temp:  [97.1 °F (36.2 °C)-98.3 °F (36.8 °C)] 97.1 °F (36.2 °C)  Heart Rate:  [] 91  Resp:  [16-20] 16  BP: (121-132)/(64-69) 126/66    Intake & Output (last day)         05/22 0701  05/23 0700 05/23 0701  05/24 0700    P.O. 1020 240    Total Intake(mL/kg) 1020 (14) 240 (3.3)    Urine (mL/kg/hr)      Stool      Chest Tube      Total Output      Net +1020 +240          Urine Unmeasured Occurrence 3 x     Stool Unmeasured Occurrence 2 x             Objective:  General Appearance:  Comfortable, well-appearing and in no acute distress.    Vital signs: (most recent): Blood pressure 126/66, pulse 91, temperature 97.1 °F (36.2 °C), temperature source Oral, resp. rate 16, height 170.2 cm (67\"), weight 73 kg (161 lb), SpO2 98%, not currently breastfeeding.  Vital signs are normal.    Output: Producing urine.    Lungs:  Normal effort.  She is not in respiratory distress.    Chest: Symmetric chest wall expansion.   Neurological: Patient is alert and oriented to person, place and time.    Skin:  Warm and dry.              Results Review:     I reviewed the patient's new clinical results.  I reviewed the patient's new imaging results and agree with the interpretation.  I reviewed the patient's other test results and agree with the interpretation    Imaging Results (Last 24 Hours)       Procedure Component Value Units Date/Time    XR Chest 1 View [[id number]] Collected: 05/23/24 0637     Updated: 05/23/24 0641    Narrative:      XR CHEST 1 VW-     Clinical: Chest tube management, pneumonia   "   COMPARISON examination 5/22/2024     FINDINGS: The cardiomediastinal silhouette is stable. Airspace disease  seen on the previous examination not significantly changed within the  interim. Left base chest tube removed in the interim. No pneumothorax  seen. Remainder is unremarkable.     This report was finalized on 5/23/2024 6:38 AM by Dr. Arnold Simons M.D  on Workstation: [id number]               Lab Results:     Lab Results (last 24 hours)       Procedure Component Value Units Date/Time    Fungus Culture - Tissue, Lung, Lingula [[id number]] Collected: 05/16/24 1331    Specimen: Tissue from Lung, Lingula Updated: 05/23/24 1431     Fungus Culture No fungus isolated at 1 week    AFB Culture - Tissue, Lung, Lingula [[id number]] Collected: 05/16/24 1331    Specimen: Tissue from Lung, Lingula Updated: 05/23/24 1431     AFB Culture No AFB isolated at 1 week     AFB Stain No acid fast bacilli seen on direct smear    AFB Culture - Lavage, Lung, Right Middle Lobe [[id number]] Collected: 05/16/24 1327    Specimen: Lavage from Lung, Right Middle Lobe Updated: 05/23/24 1402     AFB Culture No AFB isolated at 1 week     AFB Stain No acid fast bacilli seen on concentrated smear    Phosphorus [[id number]]  (Normal) Collected: 05/23/24 0433    Specimen: Blood Updated: 05/23/24 0544     Phosphorus 4.0 mg/dL     Magnesium [[id number]]  (Normal) Collected: 05/23/24 0433    Specimen: Blood Updated: 05/23/24 0544     Magnesium 2.2 mg/dL     Basic Metabolic Panel [[id number]]  (Abnormal) Collected: 05/23/24 0433    Specimen: Blood Updated: 05/23/24 0544     Glucose 105 mg/dL      BUN 8 mg/dL      Creatinine 0.70 mg/dL      Sodium 138 mmol/L      Potassium 4.4 mmol/L      Chloride 99 mmol/L      CO2 26.0 mmol/L      Calcium 9.0 mg/dL      BUN/Creatinine Ratio 11.4     Anion Gap 13.0 mmol/L      eGFR 90.3 mL/min/1.73     Narrative:      GFR Normal >60  Chronic Kidney Disease <60  Kidney Failure <15    The GFR formula is only valid for  adults with stable renal function between ages 18 and 70.    CBC & Differential [[id number]]  (Abnormal) Collected: 05/23/24 0433    Specimen: Blood Updated: 05/23/24 0526    Narrative:      The following orders were created for panel order CBC & Differential.  Procedure                               Abnormality         Status                     ---------                               -----------         ------                     CBC Auto Differential[[id number]]        Abnormal            Final result                 Please view results for these tests on the individual orders.    CBC Auto Differential [[id number]]  (Abnormal) Collected: 05/23/24 0433    Specimen: Blood Updated: 05/23/24 0526     WBC 14.50 10*3/mm3      RBC 3.63 10*6/mm3      Hemoglobin 10.3 g/dL      Hematocrit 31.5 %      MCV 86.8 fL      MCH 28.4 pg      MCHC 32.7 g/dL      RDW 13.3 %      RDW-SD 41.3 fl      MPV 8.9 fL      Platelets 763 10*3/mm3      Neutrophil % 60.3 %      Lymphocyte % 19.2 %      Monocyte % 12.8 %      Eosinophil % 4.5 %      Basophil % 0.4 %      Immature Grans % 2.8 %      Neutrophils, Absolute 8.74 10*3/mm3      Lymphocytes, Absolute 2.79 10*3/mm3      Monocytes, Absolute 1.86 10*3/mm3      Eosinophils, Absolute 0.65 10*3/mm3      Basophils, Absolute 0.06 10*3/mm3      Immature Grans, Absolute 0.40 10*3/mm3      nRBC 0.0 /100 WBC     Fungus Culture - Lavage, Lung, Right Middle Lobe [[id number]]  (Abnormal) Collected: 05/16/24 1327    Specimen: Lavage from Lung, Right Middle Lobe Updated: 05/22/24 1557     Fungus Culture Candida albicans             Assessment & Plan       * No active hospital problems. *       Assessment & Plan  Ms. Main is a pleasant 73-year-old lady with a pneumothorax s/p chest tube placement as well as pneumonia.  S/p chest tube removal yesterday.  Follow-up chest x-ray performed today demonstrates no pneumothorax.  Wean oxygen as able.  Not much further to add from thoracic surgery standpoint.  Will  sign off at this time.  Please call with any questions.  Patient to follow-up as needed.  We discussed site care and activity restrictions.     KARINE Jasso  [facility]  05/23/24  15:29 EDT

## 2024-05-23 NOTE — PAYOR COMM NOTE
"Niki King (73 y.o. Female)     ATTN: NURSE REVIEWER  RE: INITIAL INPT AUTH CLINICALS   REF#: [id number]   PLS REPLY TO REILLY [phone] OR FAX# [phone]      Date of Birth   1950    Social Security Number       Address   [address] [place] [address] [place] [address]    Home Phone   [phone]    MRN   [id number]       Moravian   Yazidi    Marital Status   Single                            Admission Date   5/16/24    Admission Type   Elective    Admitting Provider   Ramon Edwards MD    Attending Provider   Ramon Edwards MD    Department, Room/Bed   [address], E547/1       Discharge Date       Discharge Disposition       Discharge Destination                                 Attending Provider: Ramon Edwards MD    Allergies: Gluten Meal    Isolation: Droplet   Infection: Rhinovirus  (05/16/24)   Code Status: CPR    Ht: 170.2 cm (67\")   Wt: 73 kg (161 lb)    Admission Cmt: None   Principal Problem: Pneumothorax [J93.9]                   Active Insurance as of 5/16/2024       Primary Coverage       Payor Plan Insurance Group Employer/Plan Group    ANTHEM MEDICARE REPLACEMENT ANTHEM MEDICARE ADVANTAGE [id number]       Payor Plan Address Payor Plan Phone Number Payor Plan Fax Number Effective Dates    [address] [phone]  1/1/2024 - None Entered    [facility] [id number]         Subscriber Name Subscriber Birth Date Member ID       NIKI KING 1950 [id number]                     Emergency Contacts        (Rel.) Home Phone Work Phone Mobile Phone    Rosa Lindsay (Relative) -- -- [phone]    Malia(NEPHEW,Ray (Relative) [phone] -- [phone]    Malia(NIECE)Guillermina (Relative) [phone] -- [phone]              Facility-Administered Medications as of 5/23/2024   Medication Dose Route Frequency Provider Last Rate Last Admin    acetaminophen (TYLENOL) tablet 650 mg  650 mg Oral Q4H PRN Grace, " Ramon ADHIKARI MD   650 mg at 24 1651    Or    acetaminophen (TYLENOL) 160 MG/5ML oral solution 650 mg  650 mg Oral Q4H PRN Ramon Edwards MD        Or    acetaminophen (TYLENOL) suppository 650 mg  650 mg Rectal Q4H PRN Ramon Edwards MD        amitriptyline (ELAVIL) tablet 20 mg  20 mg Oral Nightly Ramon Edwards MD   20 mg at 24 2043    benzonatate (TESSALON) capsule 200 mg  200 mg Oral TID PRN Marybeth Johnson APRN   200 mg at 24 0828    sennosides-docusate (PERICOLACE) 8.6-50 MG per tablet 2 tablet  2 tablet Oral BID PRN Ramon Edwards MD        And    polyethylene glycol (MIRALAX) packet 17 g  17 g Oral Daily PRN Ramon Edwards MD        And    bisacodyl (DULCOLAX) EC tablet 5 mg  5 mg Oral Daily PRN Ramon Edwards MD        And    bisacodyl (DULCOLAX) suppository 10 mg  10 mg Rectal Daily PRN Ramon Edwards MD        budesonide-formoterol (SYMBICORT) 160-4.5 MCG/ACT inhaler 2 puff  2 puff Inhalation BID - RT Ramon Edwards MD   2 puff at 24 0644    Calcium Replacement - Follow Nurse / BPA Driven Protocol   Does not apply PRN Ramon Edwards MD        cetirizine (zyrTEC) tablet 10 mg  10 mg Oral Daily Ramon Edwards MD   10 mg at 24 0829    cilostazol (PLETAL) tablet 50 mg  50 mg Oral BID Ramon Edwards MD   50 mg at 24 0829    citalopram (CeleXA) tablet 20 mg  20 mg Oral Daily Ramon Edwards MD   20 mg at 24 0829    diazePAM (VALIUM) tablet 2 mg  2 mg Oral Q6H PRN Janiya Paiz DNP APRN   2 mg at 24 0038    [COMPLETED] fentaNYL citrate (PF) (SUBLIMAZE) injection   Intravenous PRN Karthikeyan Patiño MD   25 mcg at 24 1015    [COMPLETED] furosemide (LASIX) injection 40 mg  40 mg Intravenous Once Marybeth Johnson APRN   40 mg at 24 5867    guaiFENesin (MUCINEX) 12 hr tablet 1,200 mg  1,200 mg Oral Q12H Jurgen Graham APRN   1,200 mg at 24 0829    [] HYDROcodone-acetaminophen (NORCO) 5-325 MG per tablet 1 tablet  1  tablet Oral Q4H PRN Ramon Edwards MD   1 tablet at 24 0849    [COMPLETED] iopamidol (ISOVUE-370) 76 % injection 100 mL  100 mL Intravenous Once in imaging Ramon Edwards MD   95 mL at 24 1259    ipratropium-albuterol (DUO-NEB) nebulizer solution 3 mL  3 mL Nebulization 4x Daily - RT Jaylon Frazier MD   3 mL at 24 1106    [COMPLETED] lidocaine (XYLOCAINE) 1 % injection 20 mL  20 mL Infiltration Once Karthikeyan Patiño MD   20 mL at 24 1017    Magnesium Standard Dose Replacement - Follow Nurse / BPA Driven Protocol   Does not apply PRN Ramon Edwards MD        metoprolol tartrate (LOPRESSOR) tablet 25 mg  25 mg Oral Q12H Ramon Edwards MD   25 mg at 24 0829    [COMPLETED] midazolam (VERSED) injection   Intravenous PRN Karthikeyan Patiño MD   1 mg at 24 1015    montelukast (SINGULAIR) tablet 10 mg  10 mg Oral Daily Ramon Edwards MD   10 mg at 24 0830    [] morphine injection 2 mg  2 mg Intravenous Q3H PRN Carlo Edwards MD   2 mg at 24 1741    nicotine polacrilex (NICORETTE) gum 2 mg  2 mg Mouth/Throat Q1H PRN Ramon Edwards MD        nicotine polacrilex (NICORETTE) gum 4 mg  4 mg Mouth/Throat Q1H PRN Ramon Edwards MD        nitroglycerin (NITROSTAT) SL tablet 0.4 mg  0.4 mg Sublingual Q5 Min PRN Ramon Edwards MD        ondansetron ODT (ZOFRAN-ODT) disintegrating tablet 4 mg  4 mg Oral Q6H PRN Ramon Edwards MD        Or    ondansetron (ZOFRAN) injection 4 mg  4 mg Intravenous Q6H PRN Ramon Edwards MD   4 mg at 24 0830    pantoprazole (PROTONIX) EC tablet 40 mg  40 mg Oral Q AM Ramon Edwards MD   40 mg at 24 0512    Phosphorus Replacement - Follow Nurse / BPA Driven Protocol   Does not apply PRN Ramon Edwards MD        [COMPLETED] piperacillin-tazobactam (ZOSYN) 3.375 g IVPB in 100 mL NS MBP (CD)  3.375 g Intravenous Once Jaylon Frazier MD   3.375 g at 24 1018    piperacillin-tazobactam (ZOSYN)  3.375 g IVPB in 100 mL NS MBP (CD)  3.375 g Intravenous Q8H Jaylon Frazier MD   3.375 g at 05/23/24 0830    Potassium Replacement - Follow Nurse / BPA Driven Protocol   Does not apply PRN Ramon Edwards MD        rosuvastatin (CRESTOR) tablet 10 mg  10 mg Oral Daily Ramon Edwards MD   10 mg at 05/23/24 0829    sodium chloride 0.9 % flush 10 mL  10 mL Intravenous Q12H Ramon Edwards MD   10 mL at 05/23/24 0831    sodium chloride 0.9 % flush 10 mL  10 mL Intravenous PRN Ramon Edwards MD        sodium chloride 0.9 % infusion 40 mL  40 mL Intravenous PRN Ramon Edwards MD        spironolactone (ALDACTONE) tablet 100 mg  100 mg Oral Daily Ramon Edwards MD   100 mg at 05/23/24 0830     Lab Results (last 24 hours)       Procedure Component Value Units Date/Time    Phosphorus [[id number]]  (Normal) Collected: 05/23/24 0433    Specimen: Blood Updated: 05/23/24 0544     Phosphorus 4.0 mg/dL     Magnesium [[id number]]  (Normal) Collected: 05/23/24 0433    Specimen: Blood Updated: 05/23/24 0544     Magnesium 2.2 mg/dL     Basic Metabolic Panel [[id number]]  (Abnormal) Collected: 05/23/24 0433    Specimen: Blood Updated: 05/23/24 0544     Glucose 105 mg/dL      BUN 8 mg/dL      Creatinine 0.70 mg/dL      Sodium 138 mmol/L      Potassium 4.4 mmol/L      Chloride 99 mmol/L      CO2 26.0 mmol/L      Calcium 9.0 mg/dL      BUN/Creatinine Ratio 11.4     Anion Gap 13.0 mmol/L      eGFR 90.3 mL/min/1.73     Narrative:      GFR Normal >60  Chronic Kidney Disease <60  Kidney Failure <15    The GFR formula is only valid for adults with stable renal function between ages 18 and 70.    CBC & Differential [[id number]]  (Abnormal) Collected: 05/23/24 0433    Specimen: Blood Updated: 05/23/24 0526    Narrative:      The following orders were created for panel order CBC & Differential.  Procedure                               Abnormality         Status                     ---------                               -----------          ------                     CBC Auto Differential[[id number]]        Abnormal            Final result                 Please view results for these tests on the individual orders.    CBC Auto Differential [[id number]]  (Abnormal) Collected: 05/23/24 0433    Specimen: Blood Updated: 05/23/24 0526     WBC 14.50 10*3/mm3      RBC 3.63 10*6/mm3      Hemoglobin 10.3 g/dL      Hematocrit 31.5 %      MCV 86.8 fL      MCH 28.4 pg      MCHC 32.7 g/dL      RDW 13.3 %      RDW-SD 41.3 fl      MPV 8.9 fL      Platelets 763 10*3/mm3      Neutrophil % 60.3 %      Lymphocyte % 19.2 %      Monocyte % 12.8 %      Eosinophil % 4.5 %      Basophil % 0.4 %      Immature Grans % 2.8 %      Neutrophils, Absolute 8.74 10*3/mm3      Lymphocytes, Absolute 2.79 10*3/mm3      Monocytes, Absolute 1.86 10*3/mm3      Eosinophils, Absolute 0.65 10*3/mm3      Basophils, Absolute 0.06 10*3/mm3      Immature Grans, Absolute 0.40 10*3/mm3      nRBC 0.0 /100 WBC     Fungus Culture - Lavage, Lung, Right Middle Lobe [[id number]]  (Abnormal) Collected: 05/16/24 1327    Specimen: Lavage from Lung, Right Middle Lobe Updated: 05/22/24 1557     Fungus Culture Candida albicans          Imaging Results (Last 24 Hours)       Procedure Component Value Units Date/Time    XR Chest 1 View [[id number]] Collected: 05/23/24 0637     Updated: 05/23/24 0641    Narrative:      XR CHEST 1 VW-     Clinical: Chest tube management, pneumonia     COMPARISON examination 5/22/2024     FINDINGS: The cardiomediastinal silhouette is stable. Airspace disease  seen on the previous examination not significantly changed within the  interim. Left base chest tube removed in the interim. No pneumothorax  seen. Remainder is unremarkable.     This report was finalized on 5/23/2024 6:38 AM by Dr. Arnold Simons M.D  on Workstation: [id number]             ECG/EMG Results (last 24 hours)       ** No results found for the last 24 hours. **          Orders (last 24 hrs)        Start     Ordered     05/23/24 0600  CBC Auto Differential  PROCEDURE ONCE         05/22/24 2202    05/22/24 0600  Phosphorus  Daily       05/21/24 1159    05/22/24 0600  Magnesium  Daily       05/21/24 1159    05/22/24 0600  CBC & Differential  Daily       05/21/24 1159    05/22/24 0600  Basic Metabolic Panel  Daily       05/21/24 1159    05/21/24 1645  piperacillin-tazobactam (ZOSYN) 3.375 g IVPB in 100 mL NS MBP (CD)  Every 8 Hours         05/21/24 0946    05/21/24 1230  ipratropium-albuterol (DUO-NEB) nebulizer solution 3 mL  4 Times Daily - RT         05/21/24 0943    05/21/24 1045  guaiFENesin (MUCINEX) 12 hr tablet 1,200 mg  Every 12 Hours Scheduled         05/21/24 0959    05/21/24 0600  XR Chest 1 View  Daily       05/20/24 1431    05/20/24 1430  diazePAM (VALIUM) tablet 2 mg  Every 6 Hours PRN         05/20/24 1430    05/17/24 1755  morphine injection 2 mg  Every 3 Hours PRN         05/17/24 1755    05/17/24 0600  pantoprazole (PROTONIX) EC tablet 40 mg  Every Early Morning         05/16/24 1430    05/17/24 0415  benzonatate (TESSALON) capsule 200 mg  3 Times Daily PRN         05/17/24 0416    05/16/24 2100  amitriptyline (ELAVIL) tablet 20 mg  Nightly         05/16/24 1430    05/16/24 2100  cilostazol (PLETAL) tablet 50 mg  2 Times Daily         05/16/24 1430    05/16/24 1800  Oral Care  2 Times Daily       05/16/24 1430    05/16/24 1730  cetirizine (zyrTEC) tablet 10 mg  Daily         05/16/24 1430    05/16/24 1730  citalopram (CeleXA) tablet 20 mg  Daily         05/16/24 1430    05/16/24 1730  metoprolol tartrate (LOPRESSOR) tablet 25 mg  Every 12 Hours Scheduled         05/16/24 1430    05/16/24 1730  montelukast (SINGULAIR) tablet 10 mg  Daily         05/16/24 1430 05/16/24 1730  rosuvastatin (CRESTOR) tablet 10 mg  Daily         05/16/24 1430 05/16/24 1730  spironolactone (ALDACTONE) tablet 100 mg  Daily         05/16/24 1430 05/16/24 1730  budesonide-formoterol (SYMBICORT) 160-4.5 MCG/ACT inhaler 2 puff  2  "Times Daily - RT         05/16/24 1430    05/16/24 1600  Vital Signs  Every 4 Hours      Comments: Per per hospital policy    05/16/24 1430    05/16/24 1432  sodium chloride 0.9 % flush 10 mL  Every 12 Hours Scheduled         05/16/24 1430    05/16/24 1429  ondansetron ODT (ZOFRAN-ODT) disintegrating tablet 4 mg  Every 6 Hours PRN        Placed in \"Or\" Linked Group    05/16/24 1430    05/16/24 1429  ondansetron (ZOFRAN) injection 4 mg  Every 6 Hours PRN        Placed in \"Or\" Linked Group    05/16/24 1430    05/16/24 1429  sennosides-docusate (PERICOLACE) 8.6-50 MG per tablet 2 tablet  2 Times Daily PRN        Placed in \"And\" Linked Group    05/16/24 1430    05/16/24 1429  polyethylene glycol (MIRALAX) packet 17 g  Daily PRN        Placed in \"And\" Linked Group    05/16/24 1430    05/16/24 1429  bisacodyl (DULCOLAX) EC tablet 5 mg  Daily PRN        Placed in \"And\" Linked Group    05/16/24 1430    05/16/24 1429  bisacodyl (DULCOLAX) suppository 10 mg  Daily PRN        Placed in \"And\" Linked Group    05/16/24 1430    05/16/24 1428  acetaminophen (TYLENOL) tablet 650 mg  Every 4 Hours PRN        Placed in \"Or\" Linked Group    05/16/24 1430    05/16/24 1428  acetaminophen (TYLENOL) 160 MG/5ML oral solution 650 mg  Every 4 Hours PRN        Placed in \"Or\" Linked Group    05/16/24 1430    05/16/24 1428  acetaminophen (TYLENOL) suppository 650 mg  Every 4 Hours PRN        Placed in \"Or\" Linked Group    05/16/24 1430    05/16/24 1428  Potassium Replacement - Follow Nurse / BPA Driven Protocol  As Needed         05/16/24 1430    05/16/24 1428  Magnesium Standard Dose Replacement - Follow Nurse / BPA Driven Protocol  As Needed         05/16/24 1430    05/16/24 1428  Phosphorus Replacement - Follow Nurse / BPA Driven Protocol  As Needed         05/16/24 1430    05/16/24 1428  Calcium Replacement - Follow Nurse / BPA Driven Protocol  As Needed         05/16/24 1430    05/16/24 1428  nitroglycerin (NITROSTAT) SL tablet 0.4 mg  " Every 5 Minutes PRN         05/16/24 1430    05/16/24 1428  Intake & Output  Every Shift       05/16/24 1430    05/16/24 1427  sodium chloride 0.9 % flush 10 mL  As Needed         05/16/24 1430    05/16/24 1427  sodium chloride 0.9 % infusion 40 mL  As Needed         05/16/24 1430    05/16/24 1427  nicotine polacrilex (NICORETTE) gum 4 mg  Every 1 Hour PRN         05/16/24 1430    05/16/24 1427  nicotine polacrilex (NICORETTE) gum 2 mg  Every 1 Hour PRN         05/16/24 1430    Unscheduled  Oxygen Therapy- Nasal Cannula; Titrate 1-6 LPM Per SpO2; 90 - 95%  Continuous PRN       05/16/24 1430                  Operative/Procedure Notes (last 24 hours)  Notes from 05/22/24 1254 through 05/23/24 1254   No notes of this type exist for this encounter.          Physician Progress Notes (last 24 hours)        Jurgen Graham APRN at 05/22/24 1333              Chief Complaint: Pneumothorax  S/P: Chest tube placement      Subjective:  Symptoms:  Improved.  She reports cough (Productive, improved).  No shortness of breath.  Chest pain: tenderness around chest tube..  Diet:  Adequate intake.  No nausea or vomiting.    Activity level: Returning to normal.    Pain:  She complains of pain that is mild.  Pain is well controlled.    No new complaints.  Having some discomfort from chest tube.  Still with productive cough although improved compared to yesterday.    Vital Signs:  Temp:  [97.3 °F (36.3 °C)-98.6 °F (37 °C)] 97.9 °F (36.6 °C)  Heart Rate:  [] 84  Resp:  [16-18] 16  BP: (114-146)/(66-99) 143/74    Intake & Output (last day)         05/21 0701  05/22 0700 05/22 0701  05/23 0700    P.O. 720 480    Total Intake(mL/kg) 720 (9.9) 480 (6.6)    Urine (mL/kg/hr) 0 (0)     Stool 0     Chest Tube 0     Total Output 0     Net +720 +480          Urine Unmeasured Occurrence 3 x     Stool Unmeasured Occurrence 1 x             Objective:  General Appearance:  Comfortable, well-appearing and in no acute distress.    Vital signs:  "(most recent): Blood pressure 143/74, pulse 84, temperature 97.9 °F (36.6 °C), temperature source Oral, resp. rate 16, height 170.2 cm (67\"), weight 73 kg (161 lb), SpO2 92%, not currently breastfeeding.  Vital signs are normal.    Output: Producing urine.    Lungs:  Normal effort.  There are decreased breath sounds.    Chest: Symmetric chest wall expansion. Chest wall tenderness (Around chest tube) present.    Neurological: Patient is alert and oriented to person, place and time.    Skin:  Warm and dry.                Chest tube:   Site: Left, Clean, Dry, and Intact  Suction: Clamped  Air Leak: Negative   24 Hour Total: None documented    Results Review:     I reviewed the patient's new clinical results.  I reviewed the patient's new imaging results and agree with the interpretation.  I reviewed the patient's other test results and agree with the interpretation    Imaging Results (Last 24 Hours)       Procedure Component Value Units Date/Time    XR Chest 1 View [[id number]] Collected: 05/22/24 0856     Updated: 05/22/24 0904    Narrative:      XR CHEST 1 VW-     INDICATION: Chest tube management     COMPARISON: Chest radiographs dating back to 8/25/2020. CT chest  5/20/2024 and 5/9/2024       Impression:      Left-sided pleural pigtail drainage catheter. No  pneumothorax or pleural effusion. Unchanged patchy patchy opacities in  the right middle and right greater than left bilateral lower lobes.  Stable normal size cardiomediastinal silhouette. No focal osseous  abnormality.     This report was finalized on 5/22/2024 9:01 AM by Dr. Peter Lawrence M.D on Workstation: [id number]       CT Guided Chest Tube [[id number]] Collected: 05/17/24 1516     Updated: 05/22/24 0843    Narrative:      CT-GUIDED LEFT CHEST TUBE PLACEMENT 05/17/2024     HISTORY: Left pneumothorax.     After signed informed consent was obtained, patient was prepped and  draped in the supine position. Lidocaine was used for local anesthesia.   "   18-gauge needle was introduced into the pleural space on the left. An  0.018 wire was placed. This was followed by placement of an 8-Icelandic  dilator and followed by placement of a 10-Icelandic pigtail catheter.  Catheter was connected to the atrium suction. Postprocedure images show  resolution of the left pneumothorax. The catheter was sutured in place  and left to the atrium suction.     Patient tolerated the procedure well with no complications.     Moderate sedation was provided under my direct supervision using 1 mg IV  Versed and 25 mcg IV Fentanyl. The patient was independently monitored  by a trained Department of Radiology RN using automated blood pressure,  EKG, and pulse oximetry. My total intra-service time was 14 minutes.      Radiation dose reduction techniques were utilized, including automated  exposure control and exposure modulation based on body size.        This report was finalized on 5/22/2024 8:40 AM by Dr. Karthikeyan Patiño M.D on Workstation: [id number]               Lab Results:     Lab Results (last 24 hours)       Procedure Component Value Units Date/Time    Phosphorus [[id number]]  (Abnormal) Collected: 05/22/24 0343    Specimen: Blood Updated: 05/22/24 0503     Phosphorus 5.1 mg/dL     Magnesium [[id number]]  (Normal) Collected: 05/22/24 0343    Specimen: Blood Updated: 05/22/24 0500     Magnesium 2.0 mg/dL     Basic Metabolic Panel [[id number]]  (Abnormal) Collected: 05/22/24 0343    Specimen: Blood Updated: 05/22/24 0500     Glucose 110 mg/dL      BUN 9 mg/dL      Creatinine 0.61 mg/dL      Sodium 135 mmol/L      Potassium 4.3 mmol/L      Chloride 96 mmol/L      CO2 27.2 mmol/L      Calcium 9.2 mg/dL      BUN/Creatinine Ratio 14.8     Anion Gap 11.8 mmol/L      eGFR 93.4 mL/min/1.73     Narrative:      GFR Normal >60  Chronic Kidney Disease <60  Kidney Failure <15    The GFR formula is only valid for adults with stable renal function between ages 18 and 70.    CBC & Differential  [[id number]]  (Abnormal) Collected: 05/22/24 0343    Specimen: Blood Updated: 05/22/24 0443    Narrative:      The following orders were created for panel order CBC & Differential.  Procedure                               Abnormality         Status                     ---------                               -----------         ------                     CBC Auto Differential[[id number]]        Abnormal            Final result                 Please view results for these tests on the individual orders.    CBC Auto Differential [[id number]]  (Abnormal) Collected: 05/22/24 0343    Specimen: Blood Updated: 05/22/24 0443     WBC 14.59 10*3/mm3      RBC 3.56 10*6/mm3      Hemoglobin 10.0 g/dL      Hematocrit 31.6 %      MCV 88.8 fL      MCH 28.1 pg      MCHC 31.6 g/dL      RDW 13.7 %      RDW-SD 44.6 fl      MPV 9.2 fL      Platelets 705 10*3/mm3      Neutrophil % 64.2 %      Lymphocyte % 17.5 %      Monocyte % 13.1 %      Eosinophil % 2.5 %      Basophil % 0.4 %      Immature Grans % 2.3 %      Neutrophils, Absolute 9.37 10*3/mm3      Lymphocytes, Absolute 2.56 10*3/mm3      Monocytes, Absolute 1.91 10*3/mm3      Eosinophils, Absolute 0.36 10*3/mm3      Basophils, Absolute 0.06 10*3/mm3      Immature Grans, Absolute 0.33 10*3/mm3      nRBC 0.0 /100 WBC     Fungus Culture - Tissue, Lung, Lingula [[id number]] Collected: 05/16/24 1331    Specimen: Tissue from Lung, Lingula Updated: 05/21/24 1431     Fungus Culture No fungus isolated at less than 1 week    AFB Culture - Tissue, Lung, Lingula [[id number]] Collected: 05/16/24 1331    Specimen: Tissue from Lung, Lingula Updated: 05/21/24 1431     AFB Culture No AFB isolated at less than 1 week     AFB Stain No acid fast bacilli seen on direct smear    Fungus Culture - Lavage, Lung, Right Middle Lobe [[id number]] Collected: 05/16/24 1327    Specimen: Lavage from Lung, Right Middle Lobe Updated: 05/21/24 1400     Fungus Culture No fungus isolated at less than 1 week    AFB Culture -  Lavage, Lung, Right Middle Lobe [[id number]] Collected: 05/16/24 1327    Specimen: Lavage from Lung, Right Middle Lobe Updated: 05/21/24 1400     AFB Culture No AFB isolated at less than 1 week     AFB Stain No acid fast bacilli seen on concentrated smear             Assessment & Plan       Pneumothorax       Assessment & Plan  Ms. Main is a pleasant 73-year-old lady with a pneumothorax s/p chest tube placement as well as pneumonia.      Improving oxygen requirements presently on 3 L via nasal cannula.  Chest tube remained clamped overnight.  Follow-up chest x-ray performed today demonstrates no pneumothorax therefore the chest tube was removed without difficulty.  Discussed site care and restrictions with the patient.  Will repeat x-ray in the morning.  Encourage pulmonary hygiene.  Increase activity, wean oxygen as able.    KARINE Jasso  [facility]  05/22/24  13:33 EDT            Electronically signed by Jurgen Graham APRN at 05/22/24 1336       Consult Notes (last 24 hours)  Notes from 05/22/24 1254 through 05/23/24 1254   No notes of this type exist for this encounter.

## 2024-05-23 NOTE — DISCHARGE INSTRUCTIONS
You were treated for pneumothorax while you are in the hospital.  You were also treated for pneumonia while in the hospital.  You are on IV antibiotics for 2 days, you will finish a course of antibiotics with Augmentin twice daily for an additional 3 days.  You will start your first dose tomorrow 5/24 in the morning.  You will be set up for follow-up visit with Dr. Ramon Edwards as an outpatient.

## 2024-05-23 NOTE — DISCHARGE SUMMARY
PHYSICIAN DISCHARGE SUMMARY                                                                        [facility]    Patient Identification:  Patient Name:  Che Landaverde  MRN:  [id number]   YOB: 1950  Age: 73 y.o.  Sex: female  Primary Care Physician: Myrna Tafoya MD    Admit date: 5/16/2024  Discharge date and time: No discharge date for patient encounter.   Discharged Condition: fair    Discharge Diagnoses:  * No active hospital problems. *       Hospital Course: Che Landaverde presented to [facility] on 5/16 for bronchoscopy to evaluate bilateral pulmonary infiltrates and nodules with transbronchial biopsies performed complicated by iatrogenic left pneumothorax for which she was admitted to the hospital and required chest tube placement.  Pneumothorax resolved and chest tube was ultimately removed on 5/22.  Hospital course was complicated by rhinovirus infection and superimposed bacterial pneumonia treated with Zosyn inpatient and discharged on Augmentin as an outpatient.  Upon discharge patient was found to have an oxygen requirement and discharged on 3 L nasal cannula.  Patient discharged in stable condition and to follow-up with Dr. Ramon Edwards as an outpatient.    Consults:   IP CONSULT TO THORACIC SURGERY    Significant Diagnostic Studies:     Discharge Exam:  General: Alert, nontoxic, NAD  HEENT: NC/AT, EOMI, MMM  Neck: Supple, trachea midline  Cardiac: RRR, no murmur, gallops, rubs  Pulmonary: Clear to auscultation bilaterally, no adventitious breath sounds, normal respiratory effort  GI: Soft, non-tender, non-distended, normal bowel sounds  Extremities: Warm, well perfused, no LE edema  Skin: no visible rash  Neuro: CN II - XII grossly intact  Psychiatry: Normal mood and affect     Disposition:  Home    Patient Instructions:      Discharge Medications        New Medications         Instructions Start Date   amoxicillin-clavulanate 875-125 MG per tablet  Commonly known as: AUGMENTIN   1 tablet, Oral, 2 Times Daily   Start Date: May 24, 2024            Continue These Medications        Instructions Start Date   amitriptyline 10 MG tablet  Commonly known as: ELAVIL   20 mg, Oral, Nightly      calcium citrate-vitamin d 200-250 MG-UNIT tablet tablet  Commonly known as: CITRACAL   Oral      Cetirizine HCl 10 MG capsule   Oral, Daily      cilostazol 50 MG tablet  Commonly known as: PLETAL   50 mg, Oral, 2 Times Daily      citalopram 20 MG tablet  Commonly known as: CeleXA   20 mg, Oral, Daily      colestipol 1 g tablet  Commonly known as: COLESTID   Pt taking 2 tabs po daily      dapsone 25 MG tablet   25 mg, Oral, Daily, May take 2 if  rash returns      fish oil 1200 MG capsule capsule   Oral      fluticasone 50 MCG/ACT nasal spray  Commonly known as: FLONASE   USE 2 SPRAYS IN EACH NOSTRIL EVERY DAY      gabapentin 100 MG capsule  Commonly known as: NEURONTIN   1 tablet po BID x 2 weeks then 1 at Hs x 14 days then d/c, can take 2 per day for 30 days, if problem with wean.      metoprolol tartrate 25 MG tablet  Commonly known as: LOPRESSOR   TAKE 1 TABLET AT NIGHT      montelukast 10 MG tablet  Commonly known as: SINGULAIR   10 mg, Oral, Daily      MULTI COMPLETE PO   Oral      omeprazole 40 MG capsule  Commonly known as: priLOSEC   TAKE 1 CAPSULE EVERY DAY      polycarbophil 625 MG tablet tablet   625 mg, Oral, As Needed      Probiotic capsule   Oral      rosuvastatin 10 MG tablet  Commonly known as: CRESTOR   10 mg, Oral, Daily      spironolactone 100 MG tablet  Commonly known as: ALDACTONE   100 mg, Oral, Daily      SUMAtriptan 100 MG tablet  Commonly known as: IMITREX   TAKE 1 TABLET 1 (ONE) TIME AS NEEDED FOR MIGRAINE FOR UP TO 1 DOSE.      Symbicort 160-4.5 MCG/ACT inhaler  Generic drug: budesonide-formoterol   INHALE 2 PUFFS EVERY 12 HOURS      tiotropium 18 MCG per inhalation  capsule  Commonly known as: Spiriva HandiHaler   1 capsule, Inhalation, Daily - RT      Ventolin  (90 Base) MCG/ACT inhaler  Generic drug: albuterol sulfate HFA   INHALE 2 PUFFS EVERY 4 HOURS AS NEEDED FOR WHEEZING             Stop These Medications      magnesium oxide 400 MG tablet  Commonly known as: MAG-OX             Contact information for follow-up providers       Myrna Tafoya MD .    Specialty: Internal Medicine  Contact information:  [address]  [address]  [facility] [id number] [phone]                       Contact information for after-discharge care       Home Medical Care       [facility] .    Services: Home Health Services, Home Rehabilitation  Contact information:  [address]  [facility] [address] [phone]                                    Medication Reconciliation: Please see electronically completed Med Rec.    Total time spent discharging patient including evaluation, medication reconciliation, arranging follow up, and post hospitalization instructions and education total time exceeds 30 minutes.    Signed:  Jaylon Frazier MD  5/23/2024  14:51 EDT

## 2024-05-23 NOTE — PROGRESS NOTES
Exercise Oximetry    Patient Name:Che Landaverde   MRN: [id number]   Date: 05/23/24             ROOM AIR BASELINE   SpO2% 85   Heart Rate    Blood Pressure      EXERCISE ON ROOM AIR SpO2% EXERCISE ON O2 @ 3 LPM SpO2%   1 MINUTE  1 MINUTE 92   2 MINUTES  2 MINUTES 92   3 MINUTES  3 MINUTES 92   4 MINUTES  4 MINUTES 93   5 MINUTES  5 MINUTES 93   6 MINUTES  6 MINUTES 96              Distance Walked   Distance Walked   Dyspnea (Dawson Scale)   Dyspnea (Dawson Scale)   Fatigue (Dawson Scale)   Fatigue (Dawson Scale)   SpO2% Post Exercise   SpO2% Post Exercise   HR Post Exercise   HR Post Exercise   Time to Recovery   Time to Recovery     Comments: Started patient on 3L of O2 was still sating 92%. I bumped her up to 4L at 5 min elmer and her sats went up to 93 and 96%.

## 2024-05-23 NOTE — PLAN OF CARE
Goal Outcome Evaluation:         A/O, VSS, 4LNC, up with assist, IVF administered, will continue to monitor

## 2024-05-23 NOTE — OUTREACH NOTE
Prep Survey      Flowsheet Row Responses   [facility] patient discharged from? [place]   Is LACE score < 7 ? No   Eligibility [facility]   Date of Admission 05/16/24   Date of Discharge 05/23/24   Discharge Disposition Home or Self Care   Discharge diagnosis Pneumothorax   Does the patient have one of the following disease processes/diagnoses(primary or secondary)? Other   Does the patient have Home health ordered? Yes   What is the Home health agency?  [facility]    Is there a DME ordered? Yes   What DME was ordered? [employer] and rollator   Prep survey completed? Yes            KARINA NGUYEN - Registered Nurse

## 2024-05-23 NOTE — PROGRESS NOTES
[facility] will follow post hospital as requested. Patient agreeable to services. Contact information confirmed. Verbal order received from Trenton lott/ PCP, Dr. Tafoya, for home health care.

## 2024-05-23 NOTE — PLAN OF CARE
Goal Outcome Evaluation:  Plan of Care Reviewed With: patient           Outcome Evaluation: Upon entering room, pt. sitting up in chair, awake/alert, and agreeable to work with P.T. this date.  Pt. able to ambulate 90 feet, SBA x 1, with no use of A.D. this PM.  Pt. requires SBA x 1 for sit <-> stand transfers.  BLE (standing) ther. ex. program x 10 reps completed for general strengthening.  No overt losses of balance noted during ambulation.  Will continue to progress functional mobility as tolerated.      Anticipated Discharge Disposition (PT): home with assist, home with home health

## 2024-05-23 NOTE — DISCHARGE PLACEMENT REQUEST
"Niki Landaverde (73 y.o. Female)       Date of Birth   1950    Social Security Number       Address   [address] [place] [address]    Home Phone   [phone]    MRN   [id number]       [place]   [facility]    Marital Status   Single                            Admission Date   5/16/24    Admission Type   Elective    Admitting Provider   Ramon Edwards MD    Attending Provider   Ramon Edwards MD    Department, Room/Bed   [address], [address]/1       Discharge Date       Discharge Disposition       Discharge Destination                                 Attending Provider: Ramon Edwards MD    Allergies: Gluten Meal    Isolation: Droplet   Infection: Rhinovirus  (05/16/24)   Code Status: CPR    Ht: 170.2 cm (67\")   Wt: 73 kg (161 lb)    Admission Cmt: None   Principal Problem: Pneumothorax [J93.9]                   Active Insurance as of 5/16/2024       Primary Coverage       Payor Plan Insurance Group Employer/Plan Group    ANTHEM MEDICARE REPLACEMENT ANTHEM MEDICARE ADVANTAGE [id number]       Payor Plan Address Payor Plan Phone Number Payor Plan Fax Number Effective Dates    [address] [phone]  1/1/2024 - None Entered    [facility] [id number]         Subscriber Name Subscriber Birth Date Member ID       NIKI LANDAVERDE 1950 [id number]                     Emergency Contacts        (Rel.) Home Phone Work Phone Mobile Phone    Rosa Lindsay (Relative) -- -- [phone]    Malia(NEPHEW,Ray (Relative) [phone] -- [phone]    Malia(NIECE)Guillermina (Relative) [phone] -- [phone]              Emergency Contact Information       Name Relation Home Work Mobile    Rosa Lindsay Relative   [phone]    Malia(NEPHEW,Ray Relative [phone]    Malia(NIECE),Guillermina Relative [phone]          "

## 2024-05-23 NOTE — CASE MANAGEMENT/SOCIAL WORK
Continued Stay Note  [facility]     Patient Name: Che Landaverde  MRN: [id number]  Today's Date: 5/23/2024    Admit Date: 5/16/2024    Plan: Home with HH   Discharge Plan       Row Name 05/23/24 0957       Plan    Plan Home with HH    Patient/Family in Agreement with Plan yes    Plan Comments Met with pt in room. She verified her plan is to return home with family and [facility] to provide oxygen and rollator. Family will transport her home. She will need a 6 minute walk at discharge. CCP following. RICARDO Ramirez RN                   Discharge Codes    No documentation.                 Expected Discharge Date and Time       Expected Discharge Date Expected Discharge Time    May 23, 2024               Federico Nuñez RN

## 2024-05-23 NOTE — PROGRESS NOTES
"    Chief Complaint: Pneumothorax  S/P: Chest tube placement    Subjective:  Symptoms:  Improved.  She reports cough (Productive, improved).  No shortness of breath.  Chest pain: tenderness around chest tube..  Diet:  Adequate intake.  No nausea or vomiting.    Activity level: Returning to normal.    Pain:  She complains of pain that is mild.  Pain is well controlled.    No new complaints. Eager to discharge.     Vital Signs:  Temp:  [97.1 °F (36.2 °C)-98.5 °F (36.9 °C)] 97.1 °F (36.2 °C)  Heart Rate:  [] 83  Resp:  [16-20] 18  BP: (121-132)/(64-69) 126/66    Intake & Output (last day)         05/22 0701  05/23 0700 05/23 0701  05/24 0700    P.O. 1020 240    Total Intake(mL/kg) 1020 (14) 240 (3.3)    Urine (mL/kg/hr)      Stool      Chest Tube      Total Output      Net +1020 +240          Urine Unmeasured Occurrence 3 x     Stool Unmeasured Occurrence 2 x             Objective:  General Appearance:  Comfortable, well-appearing and in no acute distress.    Vital signs: (most recent): Blood pressure 126/66, pulse 91, temperature 97.1 °F (36.2 °C), temperature source Oral, resp. rate 16, height 170.2 cm (67\"), weight 73 kg (161 lb), SpO2 98%, not currently breastfeeding.  Vital signs are normal.    Output: Producing urine.    Lungs:  Normal effort.  There are decreased breath sounds.    Chest: Symmetric chest wall expansion. Chest wall tenderness (Around chest tube) present.    Neurological: Patient is alert and oriented to person, place and time.    Skin:  Warm and dry.              Chest tube:   Site: Left, Clean, Dry, and Intact  Suction: Clamped  Air Leak: Negative   24 Hour Total: None documented    Results Review:     I reviewed the patient's new clinical results.  I reviewed the patient's new imaging results and agree with the interpretation.  I reviewed the patient's other test results and agree with the interpretation    Imaging Results (Last 24 Hours)       Procedure Component Value Units Date/Time    " XR Chest 1 View [[id number]] Collected: 05/23/24 0637     Updated: 05/23/24 0641    Narrative:      XR CHEST 1 VW-     Clinical: Chest tube management, pneumonia     COMPARISON examination 5/22/2024     FINDINGS: The cardiomediastinal silhouette is stable. Airspace disease  seen on the previous examination not significantly changed within the  interim. Left base chest tube removed in the interim. No pneumothorax  seen. Remainder is unremarkable.     This report was finalized on 5/23/2024 6:38 AM by Dr. Arnold Simons M.D  on Workstation: [id number]               Lab Results:     Lab Results (last 24 hours)       Procedure Component Value Units Date/Time    AFB Culture - Lavage, Lung, Right Middle Lobe [[id number]] Collected: 05/16/24 1327    Specimen: Lavage from Lung, Right Middle Lobe Updated: 05/23/24 1402     AFB Culture No AFB isolated at 1 week     AFB Stain No acid fast bacilli seen on concentrated smear    Phosphorus [[id number]]  (Normal) Collected: 05/23/24 0433    Specimen: Blood Updated: 05/23/24 0544     Phosphorus 4.0 mg/dL     Magnesium [[id number]]  (Normal) Collected: 05/23/24 0433    Specimen: Blood Updated: 05/23/24 0544     Magnesium 2.2 mg/dL     Basic Metabolic Panel [[id number]]  (Abnormal) Collected: 05/23/24 0433    Specimen: Blood Updated: 05/23/24 0544     Glucose 105 mg/dL      BUN 8 mg/dL      Creatinine 0.70 mg/dL      Sodium 138 mmol/L      Potassium 4.4 mmol/L      Chloride 99 mmol/L      CO2 26.0 mmol/L      Calcium 9.0 mg/dL      BUN/Creatinine Ratio 11.4     Anion Gap 13.0 mmol/L      eGFR 90.3 mL/min/1.73     Narrative:      GFR Normal >60  Chronic Kidney Disease <60  Kidney Failure <15    The GFR formula is only valid for adults with stable renal function between ages 18 and 70.    CBC & Differential [[id number]]  (Abnormal) Collected: 05/23/24 0433    Specimen: Blood Updated: 05/23/24 0526    Narrative:      The following orders were created for panel order CBC &  Differential.  Procedure                               Abnormality         Status                     ---------                               -----------         ------                     CBC Auto Differential[[id number]]        Abnormal            Final result                 Please view results for these tests on the individual orders.    CBC Auto Differential [[id number]]  (Abnormal) Collected: 05/23/24 0433    Specimen: Blood Updated: 05/23/24 0526     WBC 14.50 10*3/mm3      RBC 3.63 10*6/mm3      Hemoglobin 10.3 g/dL      Hematocrit 31.5 %      MCV 86.8 fL      MCH 28.4 pg      MCHC 32.7 g/dL      RDW 13.3 %      RDW-SD 41.3 fl      MPV 8.9 fL      Platelets 763 10*3/mm3      Neutrophil % 60.3 %      Lymphocyte % 19.2 %      Monocyte % 12.8 %      Eosinophil % 4.5 %      Basophil % 0.4 %      Immature Grans % 2.8 %      Neutrophils, Absolute 8.74 10*3/mm3      Lymphocytes, Absolute 2.79 10*3/mm3      Monocytes, Absolute 1.86 10*3/mm3      Eosinophils, Absolute 0.65 10*3/mm3      Basophils, Absolute 0.06 10*3/mm3      Immature Grans, Absolute 0.40 10*3/mm3      nRBC 0.0 /100 WBC     Fungus Culture - Lavage, Lung, Right Middle Lobe [[id number]]  (Abnormal) Collected: 05/16/24 1327    Specimen: Lavage from Lung, Right Middle Lobe Updated: 05/22/24 1557     Fungus Culture Candida albicans             Assessment & Plan       Pneumothorax       Assessment & Plan  Ms. Main is a pleasant 73-year-old lady with a pneumothorax s/p chest tube placement as well as pneumonia.      Improving oxygen requirements presently on 3 L via nasal cannula.  Chest tube remained clamped overnight.  Follow-up chest x-ray performed today demonstrates no pneumothorax therefore the chest tube was removed without difficulty.  Discussed site care and restrictions with the patient.  Will repeat x-ray in the morning.  Encourage pulmonary hygiene.  Increase activity, wean oxygen as able.    KARINE Jasso  [facility]  05/23/24  14:08 EDT

## 2024-05-23 NOTE — THERAPY TREATMENT NOTE
Patient Name: Che Landaverde  : 1950    MRN: [id number]                              Today's Date: 2024       Admit Date: 2024    Visit Dx:     ICD-10-CM ICD-9-CM   1. Pneumonia  J18.9 486     Patient Active Problem List   Diagnosis    Gastroesophageal reflux disease    Hoarseness    Essential hypertension    Migraine    Dupuytren's contracture    Mixed simple and mucopurulent chronic bronchitis    Type 2 diabetes mellitus with hyperglycemia, without long-term current use of insulin    Chronic left-sided low back pain without sciatica    Seasonal allergies    PAD (peripheral artery disease)    Celiac disease     Past Medical History:   Diagnosis Date    Aggressive former smoker     Allergic rhinitis     Anxiety     Asthma     Atherosclerotic PVD with intermittent claudication 2022    Benign essential hypertension     Chronic obstructive pulmonary disease, unspecified COPD type 2022    Cough     Diabetes mellitus     Diarrhea     Encounter for screening for lung cancer     GERD (gastroesophageal reflux disease)     Hip arthrosis     Internal hemorrhoids     Laceration of skin of lower leg     Lung nodule     Microscopic colitis     Migraine, unspecified, not intractable, without status migrainosus     Need for influenza vaccination     Need for pneumococcal vaccination     Osteoporosis     Painful hip     Pleural effusion 2020    Reactive airway disease with wheezing with acute exacerbation     Seasonal allergies     Sleep disturbances     Smoking history     Varicose veins of bilateral lower extremities with other complications 2022    Venous insufficiency (chronic) (peripheral) 2022    Venous ulcer 2022     Past Surgical History:   Procedure Laterality Date    BRAVO PROCEDURE N/A 2016    Procedure: ESOPHAGOGASTRODUODENOSCOPY AND BRAVO ;  Surgeon: Laila Trevino MD;  Location: [facility] ENDOSCOPY;  Service:     BRONCHOSCOPY N/A 2024    Procedure:  BRONCHOSCOPY WITH FLUORO with BAL and biopsy;  Surgeon: Ramon Edwards MD;  Location: [facility] ENDOSCOPY;  Service: [employer] - Pulmonary;  Laterality: N/A;  pre: pneumonia  post: same    CATARACT EXTRACTION  07/13/2015    COLONOSCOPY  03/06/2015    ih 3/15 Dr. Trevino    SIGMOIDOSCOPY N/A 06/07/2016    Procedure: SIGMOIDOSCOPY FLEXIBLE to transverse colon with biopsy;  Surgeon: Laila Trevino MD;  Location: [facility] ENDOSCOPY;  Service:       General Information       Row Name 05/23/24 1504          Physical Therapy Time and Intention    Document Type therapy note (daily note)  -MS     Mode of Treatment physical therapy;individual therapy  -MS       Row Name 05/23/24 1504          General Information    Patient Profile Reviewed yes  -MS     Existing Precautions/Restrictions fall;oxygen therapy device and L/min   Exit alarm  -MS     Barriers to Rehab none identified  -MS       Row Name 05/23/24 1504          Cognition    Orientation Status (Cognition) oriented x 3  -MS       Row Name 05/23/24 1504          Safety Issues, Functional Mobility    Comment, Safety Issues/Impairments (Mobility) Gait belt used for safety.  -MS               User Key  (r) = Recorded By, (t) = Taken By, (c) = Cosigned By      Initials Name Provider Type    Peter Benitez, PT Physical Therapist                   Mobility       Row Name 05/23/24 1505          Bed Mobility    Comment, (Bed Mobility) Up in chair this PM.  -MS       Row Name 05/23/24 1505          Sit-Stand Transfer    Sit-Stand Cache (Transfers) standby assist  -MS       Row Name 05/23/24 1505          Gait/Stairs (Locomotion)    Cache Level (Gait) standby assist  -MS     Distance in Feet (Gait) 90  -MS     Deviations/Abnormal Patterns (Gait) mary decreased  -MS               User Key  (r) = Recorded By, (t) = Taken By, (c) = Cosigned By      Initials Name Provider Type    Peter Benitez, PT Physical Therapist                   Obj/Interventions        Row Name 05/23/24 1505          Motor Skills    Therapeutic Exercise --  BLE standing ther. ex. program x 10 reps completed (Heel Raises, Mini-squats, Hip Abduction)  -MS               User Key  (r) = Recorded By, (t) = Taken By, (c) = Cosigned By      Initials Name Provider Type    Peter Benitez, PT Physical Therapist                   Goals/Plan    No documentation.                  Clinical Impression       Row Name 05/23/24 1506          Pain    Pretreatment Pain Rating 0/10 - no pain  -MS     Posttreatment Pain Rating 0/10 - no pain  -MS       Row Name 05/23/24 1506          Therapy Assessment/Plan (PT)    Therapy Frequency (PT) 3 times/wk  -MS       Row Name 05/23/24 1506          Positioning and Restraints    Pre-Treatment Position sitting in chair/recliner  -MS     Post Treatment Position chair  -MS     In Chair notified nsg;sitting;call light within reach;encouraged to call for assist;exit alarm on  All lines intact.  -MS               User Key  (r) = Recorded By, (t) = Taken By, (c) = Cosigned By      Initials Name Provider Type    Peter Benitez, PT Physical Therapist                   Outcome Measures       Row Name 05/23/24 1507 05/23/24 0800       How much help from another person do you currently need...    Turning from your back to your side while in flat bed without using bedrails? 4  -MS 4  -AK    Moving from lying on back to sitting on the side of a flat bed without bedrails? 3  -MS 4  -AK    Moving to and from a bed to a chair (including a wheelchair)? 3  -MS 3  -AK    Standing up from a chair using your arms (e.g., wheelchair, bedside chair)? 3  -MS 3  -AK    Climbing 3-5 steps with a railing? 3  -MS 3  -AK    To walk in hospital room? 3  -MS 3  -AK    AM-PAC 6 Clicks Score (PT) 19  -MS 20  -AK    Highest Level of Mobility Goal 6 --> Walk 10 steps or more  -MS 6 --> Walk 10 steps or more  -AK      Row Name 05/23/24 1507          Functional Assessment    Outcome Measure Options  AM-PAC 6 Clicks Basic Mobility (PT)  -MS               User Key  (r) = Recorded By, (t) = Taken By, (c) = Cosigned By      Initials Name Provider Type    MS Peter Callahan, PT Physical Therapist    Lnyn Singh, RN Registered Nurse                                 Physical Therapy Education       Title: PT OT SLP Therapies (Done)       Topic: Physical Therapy (Done)       Point: Mobility training (Done)       Learning Progress Summary             Patient Acceptance, E,D, VU by MS at 5/23/2024 1508    Acceptance, E,D, VU,NR by MS at 5/21/2024 1125                         Point: Home exercise program (Done)       Learning Progress Summary             Patient Acceptance, E,D, VU by MS at 5/23/2024 1508    Acceptance, E,D, VU,NR by MS at 5/21/2024 1125                         Point: Body mechanics (Done)       Learning Progress Summary             Patient Acceptance, E,D, VU by MS at 5/23/2024 1508    Acceptance, E,D, VU,NR by MS at 5/21/2024 1125                         Point: Precautions (Done)       Learning Progress Summary             Patient Acceptance, E,D, VU by MS at 5/23/2024 1508    Acceptance, E,D, VU,NR by MS at 5/21/2024 1125                                         User Key       Initials Effective Dates Name Provider Type Discipline    MS 06/16/21 -  Peter Callahan, PT Physical Therapist PT                  PT Recommendation and Plan  Planned Therapy Interventions (PT): balance training, bed mobility training, gait training, home exercise program, patient/family education, postural re-education, transfer training, strengthening  Plan of Care Reviewed With: patient  Outcome Evaluation: Upon entering room, pt. sitting up in chair, awake/alert, and agreeable to work with P.T. this date.  Pt. able to ambulate 90 feet, SBA x 1, with no use of A.D. this PM.  Pt. requires SBA x 1 for sit <-> stand transfers.  BLE (standing) ther. ex. program x 10 reps completed for general strengthening.  No overt losses  of balance noted during ambulation.  Will continue to progress functional mobility as tolerated.     Time Calculation:         PT Charges       Row Name 05/23/24 1510             Time Calculation    Start Time 1350  -MS      Stop Time 1405  -MS      Time Calculation (min) 15 min  -MS      PT Received On 05/23/24  -MS      PT - Next Appointment 05/24/24  -MS         Time Calculation- PT    Total Timed Code Minutes- PT 14 minute(s)  -MS                User Key  (r) = Recorded By, (t) = Taken By, (c) = Cosigned By      Initials Name Provider Type    Peter Benitez, PT Physical Therapist                  Therapy Charges for Today       Code Description Service Date Service Provider Modifiers Qty    [id number] HC PT THERAPEUTIC ACT EA 15 MIN 5/23/2024 Peter Callahan, PT GP 1            PT G-Codes  Outcome Measure Options: AM-PAC 6 Clicks Basic Mobility (PT)  AM-PAC 6 Clicks Score (PT): 19  AM-PAC 6 Clicks Score (OT): 19  PT Discharge Summary  Anticipated Discharge Disposition (PT): home with assist, home with home health    Peter Callahan, PT  5/23/2024

## 2024-05-24 ENCOUNTER — TRANSITIONAL CARE MANAGEMENT TELEPHONE ENCOUNTER (OUTPATIENT)
Dept: CALL CENTER | Facility: HOSPITAL | Age: 74
End: 2024-05-24
Payer: MEDICARE

## 2024-05-24 ENCOUNTER — TELEPHONE (OUTPATIENT)
Dept: INTERNAL MEDICINE | Facility: CLINIC | Age: 74
End: 2024-05-24
Payer: MEDICARE

## 2024-05-24 NOTE — TELEPHONE ENCOUNTER
Caller: Rosa Lindsay    Relationship: Emergency Contact    Best call back number: [phone]     Who are you requesting to speak with (clinical staff, provider,  specific staff member): DR BRANTLEY OR MA    What was the call regarding: PATIENT'S NIECE STATES THAT PATIENT HAS BEEN AT [facility] FOR LAST 7 DAYS FOR A PNEUMOTHORAX, PNEUMONIA, AND RHINOVIRUS. SHE WAS DISCHARGED LAST NIGHT, AND ADVISED TO FOLLOW UP WITH PCP. REQUESTS CALL BACK TO DISCUSS FURTHER, AND SEE IF DR BRANTLEY NEEDS A FOLLOW UP APPOINTMENT

## 2024-05-24 NOTE — TELEPHONE ENCOUNTER
Called and informed that she does not need to see us if she is following up with pulmonary or another specialist but we can absolutely see her if she needs the appointment

## 2024-05-24 NOTE — OUTREACH NOTE
Call Center TCM Note      Flowsheet Row Responses   [facility] patient discharged from? [place]   Does the patient have one of the following disease processes/diagnoses(primary or secondary)? Other   TCM attempt successful? Yes   Call start time 1013   Call end time 1016   Discharge diagnosis Pneumothorax   Person spoke with today (if not patient) and relationship patient   Meds reviewed with patient/caregiver? Yes   Does the patient have all medications ordered at discharge? Yes   Prescription comments amoxicillin-clavulanate (AUGMENTIN)   Is the patient taking all medications as directed (includes completed medication regime)? Yes   Medication comments take probiotic- Yogurt   Comments Patient reports she will call office and schedule fu appt.   Does the patient have an appointment with their PCP within 7-14 days of discharge? Other   Nursing Interventions Routed TCM call to PCP office, Patient declined scheduling/rescheduling appointment at this time   What is the Home health agency?  [facility]   Has home health visited the patient within 72 hours of discharge? Call prior to 72 hours   Psychosocial issues? No   Did the patient receive a copy of their discharge instructions? Yes   Nursing interventions Reviewed instructions with patient   What is the patient's perception of their health status since discharge? Improving   Is the patient/caregiver able to teach back signs and symptoms related to disease process for when to call PCP? Yes   Is the patient/caregiver able to teach back signs and symptoms related to disease process for when to call 911? Yes   Is the patient/caregiver able to teach back the hierarchy of who to call/visit for symptoms/problems? PCP, Specialist, Home health nurse, Urgent Care, ED, 911 Yes   TCM call completed? Yes   Wrap up additional comments Patient reports doing well. Chest tube site looks WDL no s/s of infection noted. No concerns or questions noted.   Call end time 1016    Would this patient benefit from a Referral to [facility] Social Work? No   Is the patient interested in additional calls from an ambulatory ? No            Michelle Johnson RN    5/24/2024, 10:16 EDT

## 2024-05-25 ENCOUNTER — HOME CARE VISIT (OUTPATIENT)
Dept: HOME HEALTH SERVICES | Facility: HOME HEALTHCARE | Age: 74
End: 2024-05-25
Payer: MEDICARE

## 2024-05-25 ENCOUNTER — HOME CARE VISIT (OUTPATIENT)
Dept: HOME HEALTH SERVICES | Facility: HOME HEALTHCARE | Age: 74
End: 2024-05-25
Payer: COMMERCIAL

## 2024-05-25 VITALS
BODY MASS INDEX: 25.27 KG/M2 | HEIGHT: 67 IN | HEART RATE: 99 BPM | DIASTOLIC BLOOD PRESSURE: 70 MMHG | RESPIRATION RATE: 20 BRPM | OXYGEN SATURATION: 95 % | TEMPERATURE: 97.4 F | WEIGHT: 161 LBS | SYSTOLIC BLOOD PRESSURE: 122 MMHG

## 2024-05-25 PROCEDURE — G0299 HHS/HOSPICE OF RN EA 15 MIN: HCPCS

## 2024-05-25 NOTE — HOME HEALTH
73 year old female admitted to home care services s/p acute hospitalization 5/16-5/23/24 at [facility]. Patient had scheduled appt 5/16 for Bronchoscopy with transbronchial biopsies to evaluate bilateral pulmonary infiltrates and nodules. Procedure was complicated with Left pnemothorax requiring chest tube which was removed 5/22. Patient treated with IV Zosyn for superimposed pneumonia. Patient sent home with oral Augmentin twice a day for 3 days. Patient sent home on Oxygen 3L per n/c continuously. Pt with history of COPD, chronic bronchitis, HTN, migranes, PAD, seasonal allergies, DM2  diet controlled, Celiac disease, chronic left sided lower back pain. Tierra lives alone in single family home with no stairs. Currently Nieces and nephew are taking turns staying with patient to assist in all needs. Patient ambulating with new rollator walker that nephew brought for patient. Patient wearing oxygen 3 L per nc continuously and has her own pulse oximeter checking saturations periodically. Lung sounds with rhonchi noted throughout, 95% on 3 L. Patient with hoarse voice, and coughing occassionally with no production noted. Patient has follow up appointments with Pulmonologist 6/6/24 with Dr. Edwards, and Vascular Dr. Marylin Webber on 6/12/24.  Patient only wants 2 nursing visits. Sn visits 1w2, for focus of care teaching Pneumonia disease process, oxygen safety, teaching medications and med effects, nutrition, hydration and bowel regimen. PT eval/tx for home safety, therapeutic exercises, strengthening, and endurance. OT eval/tx for home and bathroom safety, strengthening, and endurance.

## 2024-05-26 ENCOUNTER — NURSE TRIAGE (OUTPATIENT)
Dept: CALL CENTER | Facility: HOSPITAL | Age: 74
End: 2024-05-26
Payer: MEDICARE

## 2024-05-26 NOTE — TELEPHONE ENCOUNTER
"Reason for Disposition   General information question, no triage required and triager able to answer question    Additional Information   Negative: [1] Caller is not with the adult (patient) AND [2] reporting urgent symptoms   Negative: Lab result questions   Negative: Medication questions   Negative: Caller can't be reached by phone   Negative: Caller has already spoken to PCP or another triager   Negative: RN needs further essential information from caller in order to complete triage   Negative: Requesting regular office appointment   Negative: [1] Caller requesting NON-URGENT health information AND [2] PCP's office is the best resource   Negative: Health Information question, no triage required and triager able to answer question    Answer Assessment - Initial Assessment Questions  1. REASON FOR CALL or QUESTION: \"What is your reason for calling today?\" or \"How can I best help you?\" or \"What question do you have that I can help answer?\"      Power out and needs number for [place], gave number and discussed other options    Protocols used: Information Only Call - No Triage-ADULT-    "

## 2024-05-28 ENCOUNTER — HOME CARE VISIT (OUTPATIENT)
Dept: HOME HEALTH SERVICES | Facility: HOME HEALTHCARE | Age: 74
End: 2024-05-28
Payer: COMMERCIAL

## 2024-05-28 VITALS
RESPIRATION RATE: 18 BRPM | DIASTOLIC BLOOD PRESSURE: 63 MMHG | HEART RATE: 94 BPM | TEMPERATURE: 97.2 F | SYSTOLIC BLOOD PRESSURE: 98 MMHG | OXYGEN SATURATION: 93 %

## 2024-05-28 PROCEDURE — G0151 HHCP-SERV OF PT,EA 15 MIN: HCPCS

## 2024-05-28 NOTE — Clinical Note
"Reason for referral/Focus of Care:  72 yo F referred to home health physical therapy with decreased endurance and decreased ability to transfer and amb related to recent hospitalization for pneumonia, scope and chest tube placement    Subjective: \"It is hard for me to do stairs.\" per patient    Patient's PT Goal: \"get back to moving around on my own\" per patient    Pertinent Medical History: GERD, COPD, HTN, type 2 DM, chronic LBP, pneumothorax, asthma, new home O2    Previous level of function: IND with ADLs, IND with amb without device, driving    Social Environment/DME/Potential Barriers for Goal Attainment: lives alone with family checking in on her. Bed/bath on main floor. Has 2-3 steps to enter home. Has rollator walker and grab bars.    Skin Integrity/wound status: see wound care screen for details.    Code Status: Full    Medication issues/concerns: none identified    HB status: yes. See homebound screen for details.    Problems Identified: see Care Plan    Functional Status/Fall Risk/Safety: see PT evaluation/care plan    POC notification to Dr. Myrna Tafoya     on  5/28/24   via case communication.    Assessment: Skilled physical therapy is needed for 2w3 due to decreased ability to transfer and amb related to recent hopsitalization for pneumonia for evaluation, gait training, ther ex, HEP, fall prevention, transfer training and safe activity progression.    Plan for next visit:gait training, progress HEP, fall prevention instruction  "

## 2024-05-28 NOTE — HOME HEALTH
"Reason for referral/Focus of Care:  72 yo F referred to home health physical therapy with decreased endurance and decreased ability to transfer and amb related to recent hospitalization for pneumonia, scope and chest tube placement    Subjective: \"It is hard for me to do stairs.\" per patient    Patient's PT Goal: \"get back to moving around on my own\" per patient    Pertinent Medical History: GERD, COPD, HTN, type 2 DM, chronic LBP, pneumothorax, asthma, new home O2, PAD    Previous level of function: IND with ADLs, IND with amb without device, driving    Social Environment/DME/Potential Barriers for Goal Attainment: lives alone with family checking in on her. Bed/bath on main floor. Has 2-3 steps to enter home. Has rollator walker and grab bars.    Skin Integrity/wound status: see wound care screen for details.    Code Status: Full    Medication issues/concerns: none identified    HB status: yes. See homebound screen for details.    Problems Identified: see Care Plan    Functional Status/Fall Risk/Safety: see PT evaluation/care plan    POC notification to Dr. Myrna Tafoya     on  5/28/24   via case communication.    Assessment: Skilled physical therapy is needed for 2w3 due to decreased ability to transfer and amb related to recent hopsitalization for pneumonia for evaluation, gait training, ther ex, HEP, fall prevention, transfer training and safe activity progression.    Plan for next visit:gait training, progress HEP, fall prevention instruction"

## 2024-05-30 ENCOUNTER — HOME CARE VISIT (OUTPATIENT)
Dept: HOME HEALTH SERVICES | Facility: HOME HEALTHCARE | Age: 74
End: 2024-05-30
Payer: COMMERCIAL

## 2024-05-30 VITALS
SYSTOLIC BLOOD PRESSURE: 122 MMHG | OXYGEN SATURATION: 90 % | RESPIRATION RATE: 18 BRPM | TEMPERATURE: 97.9 F | HEART RATE: 99 BPM | DIASTOLIC BLOOD PRESSURE: 58 MMHG

## 2024-05-30 LAB
FUNGUS WND CULT: NORMAL
MYCOBACTERIUM SPEC CULT: NORMAL
MYCOBACTERIUM SPEC CULT: NORMAL
NIGHT BLUE STAIN TISS: NORMAL
NIGHT BLUE STAIN TISS: NORMAL

## 2024-05-30 PROCEDURE — G0299 HHS/HOSPICE OF RN EA 15 MIN: HCPCS

## 2024-05-30 NOTE — HOME HEALTH
CP and general assessment completed.  vss, afebrile, patient 90-95% on room air.  Patient trying to wean herself off the oxgyen.  Instructed for patient to continue wearing the oxgyen as lung sounds are still with rales throughout, and   coughing continues with some productiveness noted. Patient states phlegm is creamy.  Patient has finished her oral antibiotics. Discussed with patient for sn to make one more visit next week after Pulmonology appointment.  Patient agreed to one more sn visit next week.  Patient states she does feel better and is hopeful in getting better.        NEXT SN VISIT; CP assess, inquire about Pulmonologist appointment, review medications, oxgyen

## 2024-05-31 ENCOUNTER — HOME CARE VISIT (OUTPATIENT)
Dept: HOME HEALTH SERVICES | Facility: HOME HEALTHCARE | Age: 74
End: 2024-05-31
Payer: COMMERCIAL

## 2024-05-31 VITALS
SYSTOLIC BLOOD PRESSURE: 110 MMHG | HEART RATE: 88 BPM | DIASTOLIC BLOOD PRESSURE: 65 MMHG | OXYGEN SATURATION: 94 % | TEMPERATURE: 97.3 F | RESPIRATION RATE: 18 BRPM

## 2024-05-31 PROCEDURE — G0151 HHCP-SERV OF PT,EA 15 MIN: HCPCS

## 2024-05-31 NOTE — HOME HEALTH
"Subjective: \"I have been doing all the exercises twice a day and sometimes a few extra.\" per patient    no new med changes  no recent falls    Skill/education provided: see interventions for details    Patient/caregiver response: see interventions for details    Assessment: patient able to progress amb distance/time today, good follow through with HEP    Plan for next visit: gait training outside as able, stairs as able, progress gait beyond 3 minutes"

## 2024-06-03 ENCOUNTER — HOME CARE VISIT (OUTPATIENT)
Dept: HOME HEALTH SERVICES | Facility: HOME HEALTHCARE | Age: 74
End: 2024-06-03
Payer: COMMERCIAL

## 2024-06-03 VITALS
TEMPERATURE: 97.3 F | DIASTOLIC BLOOD PRESSURE: 63 MMHG | HEART RATE: 91 BPM | RESPIRATION RATE: 18 BRPM | SYSTOLIC BLOOD PRESSURE: 138 MMHG | OXYGEN SATURATION: 93 %

## 2024-06-03 PROCEDURE — G0151 HHCP-SERV OF PT,EA 15 MIN: HCPCS

## 2024-06-03 NOTE — HOME HEALTH
"Subjective: \"Things are going pretty well. I was able to get my oxygen down to 1.5 liter/min\" per patient    no new med changes  no recent falls    Skill/education provided: see interventions for details    Patient/caregiver response: see interventions for details    Assessment: patient with good follow through with walking program, able to progress to use of cane, and able to progress to resisted UE ther ex in standing.     Plan for next visit: gait training outside as able, stairs as able, progress HEP"

## 2024-06-05 ENCOUNTER — HOME CARE VISIT (OUTPATIENT)
Dept: HOME HEALTH SERVICES | Facility: HOME HEALTHCARE | Age: 74
End: 2024-06-05
Payer: COMMERCIAL

## 2024-06-05 VITALS
OXYGEN SATURATION: 92 % | SYSTOLIC BLOOD PRESSURE: 111 MMHG | RESPIRATION RATE: 18 BRPM | TEMPERATURE: 97.5 F | DIASTOLIC BLOOD PRESSURE: 60 MMHG | HEART RATE: 82 BPM

## 2024-06-05 PROCEDURE — G0151 HHCP-SERV OF PT,EA 15 MIN: HCPCS

## 2024-06-05 NOTE — HOME HEALTH
"Subjective:\"The new exercises made me a little sore.\" per patient    no new med changes  no recent falls    Skill/education provided: see interventions for details    Patient/caregiver response: see interventions for details    Assessment: patient able to progress to amb outside today for 4.5 minutes with good O2 responses. Good follow throught with HEP and steady progress    Plan for next visit: progress amb time beyond 5 minutes, ther ex instruction, fall prevention"

## 2024-06-07 ENCOUNTER — HOME CARE VISIT (OUTPATIENT)
Dept: HOME HEALTH SERVICES | Facility: HOME HEALTHCARE | Age: 74
End: 2024-06-07
Payer: COMMERCIAL

## 2024-06-07 PROCEDURE — G0299 HHS/HOSPICE OF RN EA 15 MIN: HCPCS

## 2024-06-09 VITALS
RESPIRATION RATE: 20 BRPM | DIASTOLIC BLOOD PRESSURE: 72 MMHG | TEMPERATURE: 97.9 F | OXYGEN SATURATION: 92 % | SYSTOLIC BLOOD PRESSURE: 116 MMHG | HEART RATE: 92 BPM

## 2024-06-10 NOTE — PROGRESS NOTES
Chief Complaint  Peripheral Vascular Disease (Wound on left leg )    Subjective        Che Landaverde presents to [facility] VASCULAR SURGERY  History of Present IllnessThe patient is a 73-year-old female followed for peripheral artery disease and venous stasis disease. ABIs performed on October 11, 2022, were 0.84 on the right with toe pressures of 110 mm Hg, and 0.63 on the left with toe pressures of 68 mm Hg. She has significant venous stasis changes noted in both lower extremities. She has claudication symptoms worse on the left than the right. She was placed in compression stockings of 20-30 mmHg, and started on Pletal 50 mg twice a day. Unfortunately, her biggest issue was her left hip and knee with these giving out and she is not able to really walk enough to see if she claudication.  She returned on August 23, 2023 with ABIs. These were 0.76 on the right and 0.67 on the left, improved bilaterally. She continues to complain of symptoms compatible with lumbar degenerative disc disease.  It was recommended that she have a MRI of the lumbar spine.  This was done on September 6, 2023 with advanced degenerative disc disease at the level of L5-S1 with anterior spondylolisthesis and mild to moderate foraminal narrowing.  She was treated with physical therapy.  She was seen on June 12, 2024 and ABIs were 0.78 on the right and 0.61 on the left, unchanged.    Past History:  Medical History: has a past medical history of Aggressive former smoker, Allergic rhinitis, Anxiety, Asthma, Atherosclerotic PVD with intermittent claudication (11/29/2022), Benign essential hypertension, Chronic obstructive pulmonary disease, unspecified COPD type (11/30/2022), Cough, Diabetes mellitus, Diarrhea, Encounter for screening for lung cancer, GERD (gastroesophageal reflux disease), Hip arthrosis, Internal hemorrhoids, Laceration of skin of lower leg, Lung nodule, Microscopic colitis, Migraine, unspecified, not  "intractable, without status migrainosus, Need for influenza vaccination, Need for pneumococcal vaccination, Osteoporosis, Painful hip, Pleural effusion (08/27/2020), Reactive airway disease with wheezing with acute exacerbation, Seasonal allergies, Sleep disturbances, Smoking history, Varicose veins of bilateral lower extremities with other complications (11/30/2022), Venous insufficiency (chronic) (peripheral) (11/29/2022), and Venous ulcer (11/29/2022).   Surgical History: has a past surgical history that includes Cataract extraction (07/13/2015); Colonoscopy (03/06/2015); BRAVO Procedure (N/A, 06/07/2016); Sigmoidoscopy (N/A, 06/07/2016); and Bronchoscopy (N/A, 5/16/2024).   Family History: family history includes Emphysema in her mother; Heart failure in her mother; Lung cancer in her brother; Other in her sister.   Social History: reports that she quit smoking about 14 years ago. Her smoking use included cigarettes. She started smoking about 57 years ago. She has a 88 pack-year smoking history. She has never used smokeless tobacco. She reports current alcohol use of about 2.0 standard drinks of alcohol per week. She reports that she does not use drugs.    (Not in a hospital admission)     Allergies: Gluten meal   Objective   Vital Signs:  /80   Ht 170.2 cm (67\")   Wt 73 kg (161 lb)   BMI 25.22 kg/m²   Estimated body mass index is 25.22 kg/m² as calculated from the following:    Height as of this encounter: 170.2 cm (67\").    Weight as of this encounter: 73 kg (161 lb).           Che Landaverde  reports that she quit smoking about 14 years ago. Her smoking use included cigarettes. She started smoking about 57 years ago. She has a 88 pack-year smoking history. She has never used smokeless tobacco.      Physical Exam scattered reticular varices and spider telangiectasia throughout both lower legs.  Skin lesion lateral left lower leg concerning for basal cell carcinoma.  No palpable pulses noted " distally.  Result Review :    The following data was reviewed by: Marylin Webber Jr., MD on 06/12/2024:    Data reviewed : MRI of the lumbar spine from September 6, 2023 and ABIs from June 12, 2024.             Assessment and Plan     Diagnoses and all orders for this visit:    1. PAD (peripheral artery disease) (Primary)  -     Doppler Ankle Brachial Index Single Level CAR; Future    2. Varicose veins of bilateral lower extremities with other complications    3. Chronic venous insufficiency    4. Degenerative disc disease, lumbar    5. Pulmonary emphysema, unspecified emphysema type    She continues to do satisfactorily with no worsening of her vascular disease.  She really does not have any significant claudication symptoms, most limited by both her lungs and her degenerative spine issues.  Her ABIs have remained stable.  We will continue the same.  She is due to see her dermatologist within 2 weeks and I recommended that she point the skin lesion out to that physician for biopsy.  Of note, she does have adequate perfusion for healing.  I will see her in 6 months with ABIs.         Follow Up     Return in about 6 months (around 12/12/2024) for With ABIs.  Patient was given instructions and counseling regarding her condition or for health maintenance advice. Please see specific information pulled into the AVS if appropriate.

## 2024-06-11 ENCOUNTER — HOME CARE VISIT (OUTPATIENT)
Dept: HOME HEALTH SERVICES | Facility: HOME HEALTHCARE | Age: 74
End: 2024-06-11
Payer: MEDICARE

## 2024-06-11 VITALS
SYSTOLIC BLOOD PRESSURE: 120 MMHG | RESPIRATION RATE: 18 BRPM | DIASTOLIC BLOOD PRESSURE: 68 MMHG | HEART RATE: 98 BPM | OXYGEN SATURATION: 91 % | TEMPERATURE: 97.3 F

## 2024-06-11 PROCEDURE — G0151 HHCP-SERV OF PT,EA 15 MIN: HCPCS

## 2024-06-11 NOTE — HOME HEALTH
Discharge Summary:    Patient was seen for PT discharge today due to PT goals met see interventions/goal status for details. Patient was seen for a total of  5 visits for       evaluation, gait training, transfer training, home safety, fall prevention, and pain/edema management. Currently patient is able to to amb IND/SUP without device for 5.5 minutes, IND with transfers, IND with HEP with written handouts, IND with pain/edema management and fall prevention. Patient agrees with PT discharge.    Home environment: lives alone with family checking in on her    Follow up: with MD

## 2024-06-12 ENCOUNTER — OFFICE VISIT (OUTPATIENT)
Age: 74
End: 2024-06-12
Payer: MEDICARE

## 2024-06-12 ENCOUNTER — HOSPITAL ENCOUNTER (OUTPATIENT)
Facility: HOSPITAL | Age: 74
Discharge: HOME | End: 2024-06-12
Admitting: SURGERY
Payer: MEDICARE

## 2024-06-12 VITALS
WEIGHT: 161 LBS | DIASTOLIC BLOOD PRESSURE: 80 MMHG | HEIGHT: 67 IN | BODY MASS INDEX: 25.27 KG/M2 | SYSTOLIC BLOOD PRESSURE: 148 MMHG

## 2024-06-12 DIAGNOSIS — I73.9 PERIPHERAL VASCULAR DISEASE, UNSPECIFIED: ICD-10-CM

## 2024-06-12 DIAGNOSIS — I87.2 CHRONIC VENOUS INSUFFICIENCY: ICD-10-CM

## 2024-06-12 DIAGNOSIS — I73.9 PAD (PERIPHERAL ARTERY DISEASE): Primary | ICD-10-CM

## 2024-06-12 DIAGNOSIS — J43.9 PULMONARY EMPHYSEMA, UNSPECIFIED EMPHYSEMA TYPE: ICD-10-CM

## 2024-06-12 DIAGNOSIS — I83.893 VARICOSE VEINS OF BILATERAL LOWER EXTREMITIES WITH OTHER COMPLICATIONS: ICD-10-CM

## 2024-06-12 DIAGNOSIS — M51.36 DEGENERATIVE DISC DISEASE, LUMBAR: ICD-10-CM

## 2024-06-12 LAB
BH CV LOWER ARTERIAL LEFT ABI RATIO: 0.61
BH CV LOWER ARTERIAL LEFT DORSALIS PEDIS SYS MAX: 92
BH CV LOWER ARTERIAL LEFT POST TIBIAL SYS MAX: 80
BH CV LOWER ARTERIAL RIGHT ABI RATIO: 0.78
BH CV LOWER ARTERIAL RIGHT DORSALIS PEDIS SYS MAX: 98
BH CV LOWER ARTERIAL RIGHT POST TIBIAL SYS MAX: 118
UPPER ARTERIAL LEFT ARM BRACHIAL SYS MAX: 152
UPPER ARTERIAL RIGHT ARM BRACHIAL SYS MAX: 148

## 2024-06-12 PROCEDURE — 93922 UPR/L XTREMITY ART 2 LEVELS: CPT | Performed by: SURGERY

## 2024-06-12 PROCEDURE — 93922 UPR/L XTREMITY ART 2 LEVELS: CPT

## 2024-06-17 LAB — FUNGUS WND CULT: ABNORMAL

## 2024-06-20 LAB
MYCOBACTERIUM SPEC CULT: NORMAL
MYCOBACTERIUM SPEC CULT: NORMAL
NIGHT BLUE STAIN TISS: NORMAL
NIGHT BLUE STAIN TISS: NORMAL

## 2024-07-10 ENCOUNTER — HOSPITAL ENCOUNTER (OUTPATIENT)
Dept: CT IMAGING | Facility: HOSPITAL | Age: 74
Discharge: HOME | End: 2024-07-10
Admitting: INTERNAL MEDICINE
Payer: MEDICARE

## 2024-07-10 DIAGNOSIS — R91.1 LUNG NODULE: ICD-10-CM

## 2024-07-10 PROCEDURE — 71250 CT THORAX DX C-: CPT

## 2024-08-06 DIAGNOSIS — G89.29 CHRONIC LEFT-SIDED LOW BACK PAIN WITHOUT SCIATICA: ICD-10-CM

## 2024-08-06 DIAGNOSIS — M54.50 CHRONIC LEFT-SIDED LOW BACK PAIN WITHOUT SCIATICA: ICD-10-CM

## 2024-08-06 DIAGNOSIS — E11.65 TYPE 2 DIABETES MELLITUS WITH HYPERGLYCEMIA, WITHOUT LONG-TERM CURRENT USE OF INSULIN: ICD-10-CM

## 2024-08-06 DIAGNOSIS — I10 ESSENTIAL HYPERTENSION: Primary | ICD-10-CM

## 2024-08-06 DIAGNOSIS — Z79.899 HIGH RISK MEDICATION USE: ICD-10-CM

## 2024-08-06 RX ORDER — GABAPENTIN 100 MG/1
CAPSULE ORAL
Qty: 270 CAPSULE | Refills: 1 | Status: SHIPPED | OUTPATIENT
Start: 2024-08-06

## 2024-09-05 RX ORDER — ROSUVASTATIN CALCIUM 10 MG/1
10 TABLET, COATED ORAL DAILY
Qty: 90 TABLET | Refills: 1 | Status: SHIPPED | OUTPATIENT
Start: 2024-09-05

## 2024-09-12 DIAGNOSIS — K21.9 GASTROESOPHAGEAL REFLUX DISEASE, UNSPECIFIED WHETHER ESOPHAGITIS PRESENT: ICD-10-CM

## 2024-09-12 RX ORDER — MONTELUKAST SODIUM 10 MG/1
10 TABLET ORAL DAILY
Qty: 90 TABLET | Refills: 1 | Status: SHIPPED | OUTPATIENT
Start: 2024-09-12

## 2024-09-12 RX ORDER — AMITRIPTYLINE HYDROCHLORIDE 10 MG/1
20 TABLET ORAL NIGHTLY
Qty: 180 TABLET | Refills: 1 | Status: SHIPPED | OUTPATIENT
Start: 2024-09-12

## 2024-09-12 RX ORDER — DAPSONE 25 MG/1
25 TABLET ORAL DAILY
Qty: 90 TABLET | Refills: 1 | Status: SHIPPED | OUTPATIENT
Start: 2024-09-12

## 2024-09-12 RX ORDER — ROSUVASTATIN CALCIUM 10 MG/1
10 TABLET, COATED ORAL DAILY
Qty: 90 TABLET | Refills: 1 | Status: SHIPPED | OUTPATIENT
Start: 2024-09-12

## 2024-09-12 RX ORDER — MONTELUKAST SODIUM 4 MG/1
TABLET, CHEWABLE ORAL
Qty: 180 TABLET | Refills: 1 | Status: SHIPPED | OUTPATIENT
Start: 2024-09-12

## 2024-09-12 RX ORDER — CITALOPRAM HYDROBROMIDE 20 MG/1
20 TABLET ORAL DAILY
Qty: 90 TABLET | Refills: 1 | Status: SHIPPED | OUTPATIENT
Start: 2024-09-12

## 2024-11-13 ENCOUNTER — OFFICE VISIT (OUTPATIENT)
Dept: INTERNAL MEDICINE | Facility: CLINIC | Age: 74
End: 2024-11-13
Payer: MEDICARE

## 2024-11-13 VITALS
WEIGHT: 156 LBS | HEIGHT: 67 IN | BODY MASS INDEX: 24.48 KG/M2 | DIASTOLIC BLOOD PRESSURE: 64 MMHG | SYSTOLIC BLOOD PRESSURE: 130 MMHG | HEART RATE: 74 BPM

## 2024-11-13 DIAGNOSIS — I10 ESSENTIAL HYPERTENSION: Chronic | ICD-10-CM

## 2024-11-13 DIAGNOSIS — J41.8 MIXED SIMPLE AND MUCOPURULENT CHRONIC BRONCHITIS: Chronic | ICD-10-CM

## 2024-11-13 DIAGNOSIS — Z00.00 MEDICARE ANNUAL WELLNESS VISIT, SUBSEQUENT: Primary | ICD-10-CM

## 2024-11-13 DIAGNOSIS — I73.9 PAD (PERIPHERAL ARTERY DISEASE): Chronic | ICD-10-CM

## 2024-11-13 DIAGNOSIS — E11.65 TYPE 2 DIABETES MELLITUS WITH HYPERGLYCEMIA, WITHOUT LONG-TERM CURRENT USE OF INSULIN: Chronic | ICD-10-CM

## 2024-11-13 PROCEDURE — G0439 PPPS, SUBSEQ VISIT: HCPCS | Performed by: INTERNAL MEDICINE

## 2024-11-13 PROCEDURE — 3078F DIAST BP <80 MM HG: CPT | Performed by: INTERNAL MEDICINE

## 2024-11-13 PROCEDURE — 1160F RVW MEDS BY RX/DR IN RCRD: CPT | Performed by: INTERNAL MEDICINE

## 2024-11-13 PROCEDURE — 1159F MED LIST DOCD IN RCRD: CPT | Performed by: INTERNAL MEDICINE

## 2024-11-13 PROCEDURE — 1125F AMNT PAIN NOTED PAIN PRSNT: CPT | Performed by: INTERNAL MEDICINE

## 2024-11-13 PROCEDURE — 1170F FXNL STATUS ASSESSED: CPT | Performed by: INTERNAL MEDICINE

## 2024-11-13 PROCEDURE — 3075F SYST BP GE 130 - 139MM HG: CPT | Performed by: INTERNAL MEDICINE

## 2024-11-13 NOTE — PROGRESS NOTES
Subjective   The ABCs of the Annual Wellness Visit  Medicare Wellness Visit      Che Landaverde is a 74 y.o. patient who presents for a Medicare Wellness Visit.    The following portions of the patient's history were reviewed and   updated as appropriate: allergies, current medications, past family history, past medical history, past social history, past surgical history, and problem list.    Compared to one year ago, the patient's physical   health is better.  Compared to one year ago, the patient's mental   health is better.    Recent Hospitalizations:  This patient has had a [facility] admission record on file within the last 365 days.  Current Medical Providers:  Patient Care Team:  Myrna Tafoya MD as PCP - General (Internal Medicine)  Iza Porter MD as Consulting Physician (Obstetrics and Gynecology)  Ramon Edwards MD as Consulting Physician (Pulmonary Disease)  Bryce Navarro MD as Consulting Physician (Otolaryngology)  Annamaria Sequeira MD as Consulting Physician (Dermatology)    Outpatient Medications Prior to Visit   Medication Sig Dispense Refill    amitriptyline (ELAVIL) 10 MG tablet Take 2 tablets by mouth Every Night. 180 tablet 1    calcium citrate-vitamin d (CITRACAL) 200-250 MG-UNIT tablet tablet Take  by mouth. Indications: Low Amount of Calcium in the Blood      Cetirizine HCl 10 MG capsule Take  by mouth Daily. Indications: Hayfever      cilostazol (PLETAL) 50 MG tablet Take 1 tablet by mouth 2 (Two) Times a Day. 180 tablet 1    citalopram (CeleXA) 20 MG tablet Take 1 tablet by mouth Daily. Indications: Major Depressive Disorder 90 tablet 1    colestipol (COLESTID) 1 g tablet Pt taking 2 tabs po daily  Indications: High Amount of Cholesterol in the Blood 180 tablet 1    dapsone 25 MG tablet Take 1 tablet by mouth Daily. May take 2 if  rash returns 90 tablet 1    fluticasone (FLONASE) 50 MCG/ACT nasal spray USE 2 SPRAYS IN EACH NOSTRIL EVERY DAY 48 g 10    guaiFENesin 200  MG/5ML liquid Take 200 mg by mouth 2 (Two) Times a Day. Indications: Cough      metoprolol tartrate (LOPRESSOR) 25 MG tablet TAKE 1 TABLET AT NIGHT 90 tablet 1    montelukast (SINGULAIR) 10 MG tablet Take 1 tablet by mouth Daily. 90 tablet 1    Multiple Vitamins-Minerals (MULTI COMPLETE PO) Take  by mouth.      Omega-3 Fatty Acids (FISH OIL) 1200 MG capsule capsule Take  by mouth.      omeprazole (priLOSEC) 40 MG capsule TAKE 1 CAPSULE EVERY DAY 90 capsule 3    polycarbophil (FIBERCON) 625 MG tablet Take 1 tablet by mouth As Needed. Indications: FIBER      Probiotic capsule Take  by mouth.      rosuvastatin (CRESTOR) 10 MG tablet Take 1 tablet by mouth Daily. Indications: High Amount of Fats in the Blood 90 tablet 1    spironolactone (ALDACTONE) 100 MG tablet Take 1 tablet by mouth Daily. 90 tablet 1    SUMAtriptan (IMITREX) 100 MG tablet TAKE 1 TABLET 1 (ONE) TIME AS NEEDED FOR MIGRAINE FOR UP TO 1 DOSE. 9 tablet 10    Symbicort 160-4.5 MCG/ACT inhaler INHALE 2 PUFFS EVERY 12 HOURS 3 each 1    tiotropium (Spiriva HandiHaler) 18 MCG per inhalation capsule Place 1 capsule into inhaler and inhale Daily. 30 capsule 0    Ventolin  (90 Base) MCG/ACT inhaler INHALE 2 PUFFS EVERY 4 HOURS AS NEEDED FOR WHEEZING 54 g 1    cefdinir (OMNICEF) 300 MG capsule Take 1 capsule by mouth 2 (Two) Times a Day.      gabapentin (NEURONTIN) 100 MG capsule Take 1 capsule by mouth Every Morning AND 2 capsules Every Night. 2 tablets at nighttime 270 capsule 1     No facility-administered medications prior to visit.     No opioid medication identified on active medication list. I have reviewed chart for other potential  high risk medication/s and harmful drug interactions in the elderly.      Aspirin is not on active medication list.  Aspirin use is not indicated based on review of current medical condition/s. Risk of harm outweighs potential benefits.  .    Patient Active Problem List   Diagnosis    Gastroesophageal reflux disease     "Hoarseness    Essential hypertension    Migraine    Dupuytren's contracture    Mixed simple and mucopurulent chronic bronchitis    Type 2 diabetes mellitus with hyperglycemia, without long-term current use of insulin    Chronic left-sided low back pain without sciatica    Seasonal allergies    PAD (peripheral artery disease)    Celiac disease     Advance Care Planning Advance Directive is on file.  ACP discussion was held with the patient during this visit. Patient has an advance directive in EMR which is still valid.             Objective   Vitals:    24 0907   BP: 130/64   Pulse: 74   Weight: 70.8 kg (156 lb)   Height: 170.2 cm (67\")       Estimated body mass index is 24.43 kg/m² as calculated from the following:    Height as of this encounter: 170.2 cm (67\").    Weight as of this encounter: 70.8 kg (156 lb).    BMI is within normal parameters. No other follow-up for BMI required.       Does the patient have evidence of cognitive impairment? No  Lab Results   Component Value Date    HGBA1C 6.6 (H) 2024                                                                                                Health  Risk Assessment    Smoking Status:  Social History     Tobacco Use   Smoking Status Former    Current packs/day: 0.00    Average packs/day: 2.0 packs/day for 44.0 years (88.0 ttl pk-yrs)    Types: Cigarettes    Start date: 1967    Quit date: 2010    Years since quittin.8   Smokeless Tobacco Never   Tobacco Comments    smoked 44 years, 2 packs a day      Alcohol Consumption:  Social History     Substance and Sexual Activity   Alcohol Use Yes    Alcohol/week: 2.0 standard drinks of alcohol    Types: 2 Cans of beer per week    Comment: socially       Fall Risk Screen  STEADI Fall Risk Assessment was completed, and patient is at LOW risk for falls.Assessment completed on:2024    Depression Screening   Little interest or pleasure in doing things? Not at all   Feeling down, depressed, or " hopeless? Not at all   PHQ-2 Total Score 0      Health Habits and Functional and Cognitive Screenin/13/2024     7:38 AM   Functional & Cognitive Status   Do you have difficulty preparing food and eating? No    Do you have difficulty bathing yourself, getting dressed or grooming yourself? No    Do you have difficulty using the toilet? No    Do you have difficulty moving around from place to place? No    Do you have trouble with steps or getting out of a bed or a chair? No    Current Diet Low Fat Diet    Dental Exam Up to date    Eye Exam Up to date    Exercise (times per week) 2 times per week    Current Exercises Include Home Exercise Program (TV, Computer, Etc.);Walking    Do you need help using the phone?  No    Are you deaf or do you have serious difficulty hearing?  Yes    Do you need help to go to places out of walking distance? No    Do you need help shopping? No    Do you need help preparing meals?  No    Do you need help with housework?  No    Do you need help with laundry? No    Do you need help taking your medications? No    Do you need help managing money? No    Do you ever drive or ride in a car without wearing a seat belt? No    Have you felt unusual stress, anger or loneliness in the last month? No    Who do you live with? Alone    If you need help, do you have trouble finding someone available to you? No    Have you been bothered in the last four weeks by sexual problems? No    Do you have difficulty concentrating, remembering or making decisions? No        Patient-reported           Age-appropriate Screening Schedule:  Refer to the list below for future screening recommendations based on patient's age, sex and/or medical conditions. Orders for these recommended tests are listed in the plan section. The patient has been provided with a written plan.    Health Maintenance List  Health Maintenance   Topic Date Due    HEPATITIS C SCREENING  Never done    LIPID PANEL  2024    URINE  MICROALBUMIN  11/01/2024    ANNUAL WELLNESS VISIT  11/08/2024    DIABETIC FOOT EXAM  11/08/2024    DIABETIC EYE EXAM  11/21/2024    HEMOGLOBIN A1C  05/06/2025    LUNG CANCER SCREENING  07/10/2025    DXA SCAN  11/09/2025    TDAP/TD VACCINES (2 - Td or Tdap) 05/21/2030    COVID-19 Vaccine  Completed    RSV Vaccine - Adults  Completed    INFLUENZA VACCINE  Completed    Pneumococcal Vaccine 65+  Completed    ZOSTER VACCINE  Completed    MAMMOGRAM  Discontinued    COLORECTAL CANCER SCREENING  Discontinued                                                                                                                                                CMS Preventative Services Quick Reference  Risk Factors Identified During Encounter  DM related labs and exams    The above risks/problems have been discussed with the patient.  Pertinent information has been shared with the patient in the After Visit Summary.  An After Visit Summary and PPPS were made available to the patient.    Follow Up:   Next Medicare Wellness visit to be scheduled in 1 year.         Additional E&M Note during same encounter follows:  Patient has additional, significant, and separately identifiable condition(s)/problem(s) that require work above and beyond the Medicare Wellness Visit     Chief Complaint  Medicare Wellness-subsequent    Subjective   HPI  Che is also being seen today for additional medical problem/s.    Recently got a dog to help with loneliness and not moving around much. She already feels that there is a difference. She is moving more and has a better outlook since.   Has weaned herself off of the gabapentin.  On occ gets some leg pain but nothing like she had the past and not worth taking.   Treated for COPD exacerbation last month- got much better but feels it may be coming back- she is doing the exercises she knows to try to treat it.   Intermittent dysuria for a few episodes then resolves- no ua done.   No vaginal irritation.    "          Objective   Vital Signs:  /64   Pulse 74   Ht 170.2 cm (67\")   Wt 70.8 kg (156 lb)   BMI 24.43 kg/m²   Physical Exam  Constitutional:       Appearance: Normal appearance.      Comments: Voice is hoarse   Cardiovascular:      Rate and Rhythm: Normal rate and regular rhythm.   Pulmonary:      Effort: Pulmonary effort is normal.      Breath sounds: No wheezing.   Musculoskeletal:      Right lower leg: No edema.      Left lower leg: No edema.   Lymphadenopathy:      Cervical: No cervical adenopathy.   Neurological:      General: No focal deficit present.   Psychiatric:         Mood and Affect: Mood normal.         The following data was reviewed by: Myrna Tafoya MD on 11/13/2024:     Common labs          5/22/2024    03:43 5/23/2024    04:33 11/6/2024    09:45   Common Labs   Glucose 110  105  102    BUN 9  8  9    Creatinine 0.61  0.70  0.83    Sodium 135  138  138    Potassium 4.3  4.4  4.5    Chloride 96  99  101    Calcium 9.2  9.0  9.7    Total Protein   7.6    Albumin   4.4    Total Bilirubin   0.3    Alkaline Phosphatase   71    AST (SGOT)   22    ALT (SGPT)   13    WBC 14.59  14.50     Hemoglobin 10.0  10.3     Hematocrit 31.6  31.5     Platelets 705  763     Hemoglobin A1C   6.6        Data reviewed : Consultant notes pulmonary       Assessment and Plan                   Type 2 diabetes mellitus with hyperglycemia, without long-term current use of insulin    Essential hypertension    PAD (peripheral artery disease)    Mixed simple and mucopurulent chronic bronchitis    Medicare annual wellness visit, subsequent    Diagnoses and all orders for this visit:    1. Medicare annual wellness visit, subsequent (Primary)  Comments:  Vaccines UTD- feels much happier now that she has the dog to care for- no changes made- recheck 6m/prn    2. Type 2 diabetes mellitus with hyperglycemia, without long-term current use of insulin  Comments:  stable- eye exam in Feb- cont good diet, exercise.  Assessment " & Plan:      Orders:  -     Hemoglobin A1c; Future  -     Lipid Panel With / Chol / HDL Ratio; Future  -     Comprehensive Metabolic Panel; Future  -     Microalbumin / Creatinine Urine Ratio - Urine, Clean Catch; Future    3. Essential hypertension  Comments:  excellent control, no change.  Assessment & Plan:        4. PAD (peripheral artery disease)  Comments:  doing great- better since moving more. same Pletal.    5. Mixed simple and mucopurulent chronic bronchitis  Comments:  stable- few exacerbations- knows how to treat- no change in meds.               Follow Up   Return in about 6 months (around 5/13/2025) for Recheck, Lab Before FUP.  Patient was given instructions and counseling regarding her condition or for health maintenance advice. Please see specific information pulled into the AVS if appropriate.

## 2024-12-06 DIAGNOSIS — G43.709 CHRONIC MIGRAINE WITHOUT AURA WITHOUT STATUS MIGRAINOSUS, NOT INTRACTABLE: ICD-10-CM

## 2024-12-06 RX ORDER — CILOSTAZOL 50 MG/1
50 TABLET ORAL 2 TIMES DAILY
Qty: 180 TABLET | Refills: 1 | Status: SHIPPED | OUTPATIENT
Start: 2024-12-06

## 2024-12-06 RX ORDER — OMEPRAZOLE 40 MG/1
40 CAPSULE, DELAYED RELEASE ORAL DAILY
Qty: 90 CAPSULE | Refills: 3 | Status: SHIPPED | OUTPATIENT
Start: 2024-12-06

## 2024-12-06 RX ORDER — SUMATRIPTAN SUCCINATE 100 MG/1
TABLET ORAL
Qty: 9 TABLET | Refills: 10 | Status: SHIPPED | OUTPATIENT
Start: 2024-12-06

## 2025-01-03 NOTE — PROGRESS NOTES
Chief Complaint  peripheral artieral disease   Follow-up on peripheral artery disease with claudication    Subjective        Che Landaverde presents to [facility] VASCULAR SURGERY  History of Present Illness  The patient is a 73-year-old female followed for peripheral artery disease and venous stasis disease. ABIs performed on October 11, 2022, were 0.84 on the right with toe pressures of 110 mm Hg, and 0.63 on the left with toe pressures of 68 mm Hg. She has significant venous stasis changes noted in both lower extremities. She has claudication symptoms worse on the left than the right. She was placed in compression stockings of 20-30 mmHg, and started on Pletal 50 mg twice a day. Unfortunately, her biggest issue was her left hip and knee with these giving out and she is not able to really walk enough to see if she claudicates.  She continues to complain of symptoms compatible with lumbar degenerative disc disease.  It was recommended that she have a MRI of the lumbar spine.  This was done on September 6, 2023 with advanced degenerative disc disease at the level of L5-S1 with anterior spondylolisthesis and mild to moderate foraminal narrowing.  She was treated with physical therapy.  She was seen on June 12, 2024 and ABIs were 0.78 on the right and 0.61 on the left, unchanged.  She returned for follow-up with ABIs.  ABIs are 0.89 on the right and 1.0 on the left, improved bilaterally.  Her swelling is nicely controlled with compression stockings.       Past History:  Medical History: has a past medical history of Aggressive former smoker, Allergic rhinitis, Anxiety, Asthma, Atherosclerotic PVD with intermittent claudication (11/29/2022), Benign essential hypertension, Cancer (06/2024), Chronic obstructive pulmonary disease, unspecified COPD type (11/30/2022), Cough, Diabetes mellitus, Diarrhea, Encounter for screening for lung cancer, GERD (gastroesophageal reflux disease), Hip arthrosis, Internal  "hemorrhoids, Laceration of skin of lower leg, Lung nodule, Microscopic colitis, Migraine, unspecified, not intractable, without status migrainosus, Need for influenza vaccination, Need for pneumococcal vaccination, Osteoporosis, Painful hip, Pleural effusion (08/27/2020), Reactive airway disease with wheezing with acute exacerbation, Seasonal allergies, Sleep disturbances, Smoking history, Varicose veins of bilateral lower extremities with other complications (11/30/2022), Venous insufficiency (chronic) (peripheral) (11/29/2022), and Venous ulcer (11/29/2022).   Surgical History: has a past surgical history that includes Cataract extraction (07/13/2015); Colonoscopy (03/06/2015); BRAVO Procedure (N/A, 06/07/2016); Sigmoidoscopy (N/A, 06/07/2016); and Bronchoscopy (N/A, 5/16/2024).   Family History: family history includes Alcohol abuse in her brother; COPD in her mother; Cancer in her brother and sister; Diabetes in her brother; Emphysema in her mother; Heart failure in her mother; Kidney disease in her brother; Lung cancer in her brother.   Social History: reports that she quit smoking about 15 years ago. Her smoking use included cigarettes. She started smoking about 58 years ago. She has a 88 pack-year smoking history. She has never used smokeless tobacco. She reports current alcohol use of about 2.0 standard drinks of alcohol per week. She reports that she does not use drugs.    (Not in a hospital admission)     Allergies: Gluten meal   Objective   Vital Signs:  /66   Ht 170.2 cm (67\")   Wt 70.3 kg (155 lb)   BMI 24.28 kg/m²   Estimated body mass index is 24.28 kg/m² as calculated from the following:    Height as of this encounter: 170.2 cm (67\").    Weight as of this encounter: 70.3 kg (155 lb).     BMI is within normal parameters. No other follow-up for BMI required.    Che Landaverde  reports that she quit smoking about 15 years ago. Her smoking use included cigarettes. She started smoking about 58 " years ago. She has a 88 pack-year smoking history. She has never used smokeless tobacco.          Physical Exam   scattered reticular varices and spider telangiectasia throughout both lower legs. Skin lesion lateral left lower leg concerning for basal cell carcinoma. No palpable pulses noted distally.   Result Review :        Data reviewed : ABIs as described above             Assessment and Plan     Diagnoses and all orders for this visit:    1. PAD (peripheral artery disease) (Primary)  -     Doppler Ankle Brachial Index Single Level CAR; Future    2. Varicose veins of bilateral lower extremities with other complications    3. Chronic venous insufficiency    4. Degeneration of intervertebral disc of lumbar region with discogenic back pain and lower extremity pain    5. Pulmonary emphysema, unspecified emphysema type    She continues to do satisfactorily from both a venous insufficiency standpoint and from a peripheral artery standpoint.  Her ABIs have improved significantly bilaterally.  She continues to have symptoms that are most compatible with a neurogenic source.  At this point I recommended continuation of her current medication regimen and compression stockings.  I will see her in follow-up in 1 year with ABIs         Follow Up     Return in about 1 year (around 1/29/2026) for With ABIs.  Patient was given instructions and counseling regarding her condition or for health maintenance advice. Please see specific information pulled into the AVS if appropriate.

## 2025-01-29 ENCOUNTER — OFFICE VISIT (OUTPATIENT)
Age: 75
End: 2025-01-29
Payer: MEDICARE

## 2025-01-29 ENCOUNTER — HOSPITAL ENCOUNTER (OUTPATIENT)
Facility: HOSPITAL | Age: 75
Discharge: HOME | End: 2025-01-29
Admitting: SURGERY
Payer: MEDICARE

## 2025-01-29 VITALS
SYSTOLIC BLOOD PRESSURE: 144 MMHG | WEIGHT: 155 LBS | HEIGHT: 67 IN | BODY MASS INDEX: 24.33 KG/M2 | DIASTOLIC BLOOD PRESSURE: 66 MMHG

## 2025-01-29 DIAGNOSIS — I87.2 CHRONIC VENOUS INSUFFICIENCY: ICD-10-CM

## 2025-01-29 DIAGNOSIS — M51.362 DEGENERATION OF INTERVERTEBRAL DISC OF LUMBAR REGION WITH DISCOGENIC BACK PAIN AND LOWER EXTREMITY PAIN: ICD-10-CM

## 2025-01-29 DIAGNOSIS — J43.9 PULMONARY EMPHYSEMA, UNSPECIFIED EMPHYSEMA TYPE: ICD-10-CM

## 2025-01-29 DIAGNOSIS — I73.9 PAD (PERIPHERAL ARTERY DISEASE): ICD-10-CM

## 2025-01-29 DIAGNOSIS — I83.893 VARICOSE VEINS OF BILATERAL LOWER EXTREMITIES WITH OTHER COMPLICATIONS: ICD-10-CM

## 2025-01-29 DIAGNOSIS — I73.9 PAD (PERIPHERAL ARTERY DISEASE): Primary | ICD-10-CM

## 2025-01-29 LAB
BH CV LOWER ARTERIAL LEFT ABI RATIO: 1.01
BH CV LOWER ARTERIAL LEFT DORSALIS PEDIS SYS MAX: 120
BH CV LOWER ARTERIAL LEFT POST TIBIAL SYS MAX: 148
BH CV LOWER ARTERIAL RIGHT ABI RATIO: 0.89
BH CV LOWER ARTERIAL RIGHT DORSALIS PEDIS SYS MAX: 130
BH CV LOWER ARTERIAL RIGHT POST TIBIAL SYS MAX: 124
UPPER ARTERIAL LEFT ARM BRACHIAL SYS MAX: NORMAL
UPPER ARTERIAL RIGHT ARM BRACHIAL SYS MAX: NORMAL

## 2025-01-29 PROCEDURE — 93922 UPR/L XTREMITY ART 2 LEVELS: CPT

## 2025-04-02 RX ORDER — CILOSTAZOL 50 MG/1
TABLET ORAL
Qty: 180 TABLET | Refills: 1 | Status: SHIPPED | OUTPATIENT
Start: 2025-04-02

## 2025-05-14 ENCOUNTER — OFFICE VISIT (OUTPATIENT)
Dept: INTERNAL MEDICINE | Facility: CLINIC | Age: 75
End: 2025-05-14
Payer: MEDICARE

## 2025-05-14 VITALS
DIASTOLIC BLOOD PRESSURE: 74 MMHG | SYSTOLIC BLOOD PRESSURE: 142 MMHG | HEIGHT: 67 IN | WEIGHT: 159 LBS | BODY MASS INDEX: 24.96 KG/M2 | HEART RATE: 78 BPM

## 2025-05-14 DIAGNOSIS — J41.8 MIXED SIMPLE AND MUCOPURULENT CHRONIC BRONCHITIS: Primary | Chronic | ICD-10-CM

## 2025-05-14 DIAGNOSIS — E11.65 TYPE 2 DIABETES MELLITUS WITH HYPERGLYCEMIA, WITHOUT LONG-TERM CURRENT USE OF INSULIN: Chronic | ICD-10-CM

## 2025-05-14 DIAGNOSIS — I10 ESSENTIAL HYPERTENSION: Chronic | ICD-10-CM

## 2025-05-14 DIAGNOSIS — I35.8 OTHER NONRHEUMATIC AORTIC VALVE DISORDERS: ICD-10-CM

## 2025-05-14 DIAGNOSIS — I35.9 AORTIC VALVE CALCIFICATION: Chronic | ICD-10-CM

## 2025-05-14 RX ORDER — OMEPRAZOLE 40 MG/1
40 CAPSULE, DELAYED RELEASE ORAL DAILY
Qty: 90 CAPSULE | Refills: 3 | OUTPATIENT
Start: 2025-05-14

## 2025-05-14 NOTE — PROGRESS NOTES
"Chief Complaint  Hypertension    Subjective        Che MCKEE Layman presents to [facility]  History of Present Illness here for reg f/u- got a poodle puppy which has been great for her activity. Has been feeling good- cough continues- more now with time of the year. Dr. Edwards felt she is doing well.   Thinks the increased activity is related to the increased activity. Diet is a bit better.     Objective   Vital Signs:  /74   Pulse 78   Ht 170.2 cm (67\")   Wt 72.1 kg (159 lb)   BMI 24.90 kg/m²   Estimated body mass index is 24.9 kg/m² as calculated from the following:    Height as of this encounter: 170.2 cm (67\").    Weight as of this encounter: 72.1 kg (159 lb).    BMI is within normal parameters. No other follow-up for BMI required.      Physical Exam  Constitutional:       Appearance: Normal appearance.   Cardiovascular:      Rate and Rhythm: Normal rate and regular rhythm.   Musculoskeletal:      Right lower leg: No edema.      Left lower leg: No edema.        Result Review :  The following data was reviewed by: Myrna Tafoya MD on 05/14/2025:  CMP          5/23/2024    04:33 11/6/2024    09:45 5/7/2025    09:19   CMP   Glucose 105  102  84    BUN 8  9  8    Creatinine 0.70  0.83  0.79    EGFR 90.3  73  77.6    Sodium 138  138  136    Potassium 4.4  4.5  5.3    Chloride 99  101  100    Calcium 9.0  9.7  9.4    Total Protein  7.6  7.3    Albumin  4.4  4.2    Globulin  3.2  3.1    Total Bilirubin  0.3  0.3    Alkaline Phosphatase  71  62    AST (SGOT)  22  23    ALT (SGPT)  13  17    Albumin/Globulin Ratio   1.4    BUN/Creatinine Ratio 11.4  11  10.1    Anion Gap 13.0        Lipid Panel          5/7/2025    09:19   Lipid Panel   Total Cholesterol 154    Triglycerides 68    HDL Cholesterol 71    VLDL Cholesterol 13    LDL Cholesterol  70      Most Recent A1C          5/7/2025    09:19   HGBA1C Most Recent   Hemoglobin A1C 6.20      Microalbumin          5/7/2025    09:19 "   Microalbumin   Microalbumin, Urine 16.2                Assessment and Plan   Diagnoses and all orders for this visit:    1. Mixed simple and mucopurulent chronic bronchitis (Primary)  Comments:  stable    2. Aortic valve calcification  Comments:  I think she's not symptomatic- will check echo for f/u  Orders:  -     Adult Transthoracic Echo Complete W/ Cont if Necessary Per Protocol; Future    3. Other nonrheumatic aortic valve disorders  -     Adult Transthoracic Echo Complete W/ Cont if Necessary Per Protocol; Future    4. Type 2 diabetes mellitus with hyperglycemia, without long-term current use of insulin  Comments:  A1C better- no changes made.    5. Essential hypertension  Comments:  stable- no change.    Other orders  -     omeprazole (priLOSEC) 40 MG capsule; Take 1 capsule by mouth Daily. Indications: Gastroesophageal Reflux Disease  Dispense: 90 capsule; Refill: 3             Follow Up   Return in about 3 months (around 8/14/2025) for Recheck.  Patient was given instructions and counseling regarding her condition or for health maintenance advice. Please see specific information pulled into the AVS if appropriate.

## 2025-05-27 ENCOUNTER — HOSPITAL ENCOUNTER (OUTPATIENT)
Dept: CARDIOLOGY | Facility: HOSPITAL | Age: 75
Discharge: HOME | End: 2025-05-27
Admitting: INTERNAL MEDICINE
Payer: MEDICARE

## 2025-05-27 VITALS
SYSTOLIC BLOOD PRESSURE: 120 MMHG | HEIGHT: 67 IN | WEIGHT: 159 LBS | HEART RATE: 70 BPM | BODY MASS INDEX: 24.96 KG/M2 | DIASTOLIC BLOOD PRESSURE: 66 MMHG

## 2025-05-27 DIAGNOSIS — I35.9 AORTIC VALVE CALCIFICATION: Chronic | ICD-10-CM

## 2025-05-27 DIAGNOSIS — I35.8 OTHER NONRHEUMATIC AORTIC VALVE DISORDERS: ICD-10-CM

## 2025-05-27 LAB
AORTIC DIMENSIONLESS INDEX: 0.47 (DI)
AV MEAN PRESS GRAD SYS DOP V1V2: 13.7 MMHG
AV VMAX SYS DOP: 239.8 CM/SEC
BH CV ECHO MEAS - ACS: 1.21 CM
BH CV ECHO MEAS - AO MAX PG: 23 MMHG
BH CV ECHO MEAS - AO ROOT AREA (BSA CORRECTED): 1.5 CM2
BH CV ECHO MEAS - AO ROOT DIAM: 2.8 CM
BH CV ECHO MEAS - AO V2 VTI: 59.7 CM
BH CV ECHO MEAS - AVA(I,D): 1.47 CM2
BH CV ECHO MEAS - EDV(CUBED): 110.6 ML
BH CV ECHO MEAS - EDV(MOD-SP2): 93 ML
BH CV ECHO MEAS - EDV(MOD-SP4): 110 ML
BH CV ECHO MEAS - EF(MOD-SP2): 64.5 %
BH CV ECHO MEAS - EF(MOD-SP4): 60 %
BH CV ECHO MEAS - ESV(CUBED): 24.8 ML
BH CV ECHO MEAS - ESV(MOD-SP2): 33 ML
BH CV ECHO MEAS - ESV(MOD-SP4): 44 ML
BH CV ECHO MEAS - FS: 39.2 %
BH CV ECHO MEAS - IVS/LVPW: 1.13 CM
BH CV ECHO MEAS - IVSD: 0.9 CM
BH CV ECHO MEAS - LAT PEAK E' VEL: 8.8 CM/SEC
BH CV ECHO MEAS - LV DIASTOLIC VOL/BSA (35-75): 60 CM2
BH CV ECHO MEAS - LV MASS(C)D: 137.1 GRAMS
BH CV ECHO MEAS - LV MAX PG: 4.8 MMHG
BH CV ECHO MEAS - LV MEAN PG: 3.1 MMHG
BH CV ECHO MEAS - LV SYSTOLIC VOL/BSA (12-30): 24 CM2
BH CV ECHO MEAS - LV V1 MAX: 109.9 CM/SEC
BH CV ECHO MEAS - LV V1 VTI: 28 CM
BH CV ECHO MEAS - LVIDD: 4.8 CM
BH CV ECHO MEAS - LVIDS: 2.9 CM
BH CV ECHO MEAS - LVOT AREA: 3.1 CM2
BH CV ECHO MEAS - LVOT DIAM: 2 CM
BH CV ECHO MEAS - LVPWD: 0.8 CM
BH CV ECHO MEAS - MED PEAK E' VEL: 7.3 CM/SEC
BH CV ECHO MEAS - MR MAX PG: 106.4 MMHG
BH CV ECHO MEAS - MR MAX VEL: 515.8 CM/SEC
BH CV ECHO MEAS - MV A DUR: 0.14 SEC
BH CV ECHO MEAS - MV A MAX VEL: 151.5 CM/SEC
BH CV ECHO MEAS - MV DEC SLOPE: 508.2 CM/SEC2
BH CV ECHO MEAS - MV DEC TIME: 0.26 SEC
BH CV ECHO MEAS - MV E MAX VEL: 109 CM/SEC
BH CV ECHO MEAS - MV E/A: 0.72
BH CV ECHO MEAS - MV MAX PG: 7.4 MMHG
BH CV ECHO MEAS - MV MEAN PG: 3.2 MMHG
BH CV ECHO MEAS - MV P1/2T: 64.3 MSEC
BH CV ECHO MEAS - MV V2 VTI: 40.7 CM
BH CV ECHO MEAS - MVA(P1/2T): 3.4 CM2
BH CV ECHO MEAS - MVA(VTI): 2.15 CM2
BH CV ECHO MEAS - PA V2 MAX: 84.2 CM/SEC
BH CV ECHO MEAS - PULM A REVS DUR: 0.13 SEC
BH CV ECHO MEAS - PULM A REVS VEL: 32.5 CM/SEC
BH CV ECHO MEAS - PULM DIAS VEL: 32 CM/SEC
BH CV ECHO MEAS - PULM S/D: 1.42
BH CV ECHO MEAS - PULM SYS VEL: 45.6 CM/SEC
BH CV ECHO MEAS - QP/QS: 0.36
BH CV ECHO MEAS - RAP SYSTOLE: 3 MMHG
BH CV ECHO MEAS - RV MAX PG: 1.79 MMHG
BH CV ECHO MEAS - RV V1 MAX: 66.8 CM/SEC
BH CV ECHO MEAS - RV V1 VTI: 14 CM
BH CV ECHO MEAS - RVOT DIAM: 1.69 CM
BH CV ECHO MEAS - RVSP: 27 MMHG
BH CV ECHO MEAS - SV(LVOT): 87.7 ML
BH CV ECHO MEAS - SV(MOD-SP2): 60 ML
BH CV ECHO MEAS - SV(MOD-SP4): 66 ML
BH CV ECHO MEAS - SV(RVOT): 31.3 ML
BH CV ECHO MEAS - SVI(LVOT): 47.8 ML/M2
BH CV ECHO MEAS - SVI(MOD-SP2): 32.7 ML/M2
BH CV ECHO MEAS - SVI(MOD-SP4): 36 ML/M2
BH CV ECHO MEAS - TAPSE (>1.6): 2.04 CM
BH CV ECHO MEAS - TR MAX PG: 23.6 MMHG
BH CV ECHO MEAS - TR MAX VEL: 243 CM/SEC
BH CV ECHO MEASUREMENTS AVERAGE E/E' RATIO: 13.54
BH CV XLRA - RV BASE: 3.5 CM
BH CV XLRA - RV LENGTH: 6.7 CM
BH CV XLRA - RV MID: 2.41 CM
BH CV XLRA - TDI S': 11.2 CM/SEC
LEFT ATRIUM VOLUME INDEX: 36 ML/M2
LV EF BIPLANE MOD: 61.9 %
SINUS: 2.8 CM
STJ: 2.48 CM

## 2025-05-27 PROCEDURE — 93306 TTE W/DOPPLER COMPLETE: CPT

## 2025-05-27 PROCEDURE — 25510000001 PERFLUTREN 6.52 MG/ML SUSPENSION 2 ML VIAL: Performed by: INTERNAL MEDICINE

## 2025-05-27 RX ADMIN — PERFLUTREN 1 ML: 6.52 INJECTION, SUSPENSION INTRAVENOUS at 07:59

## 2025-06-06 RX ORDER — AMITRIPTYLINE HYDROCHLORIDE 10 MG/1
20 TABLET ORAL NIGHTLY
Qty: 180 TABLET | Refills: 1 | Status: SHIPPED | OUTPATIENT
Start: 2025-06-06

## 2025-06-09 RX ORDER — CITALOPRAM HYDROBROMIDE 20 MG/1
20 TABLET ORAL DAILY
Qty: 90 TABLET | Refills: 1 | Status: SHIPPED | OUTPATIENT
Start: 2025-06-09

## 2025-06-09 RX ORDER — DAPSONE 25 MG/1
25 TABLET ORAL DAILY
Qty: 90 TABLET | Refills: 1 | Status: SHIPPED | OUTPATIENT
Start: 2025-06-09

## 2025-06-30 RX ORDER — CEPHALEXIN 500 MG/1
500 CAPSULE ORAL 2 TIMES DAILY
Qty: 14 CAPSULE | Refills: 0 | Status: SHIPPED | OUTPATIENT
Start: 2025-06-30 | End: 2025-07-07

## 2025-06-30 RX ORDER — CEPHALEXIN 500 MG/1
500 CAPSULE ORAL 2 TIMES DAILY
Qty: 14 CAPSULE | Refills: 0 | Status: SHIPPED | OUTPATIENT
Start: 2025-06-30 | End: 2025-06-30

## 2025-07-29 RX ORDER — DAPSONE 25 MG/1
TABLET ORAL
Qty: 90 TABLET | Refills: 1 | Status: SHIPPED | OUTPATIENT
Start: 2025-07-29

## 2025-08-05 RX ORDER — FLUTICASONE PROPIONATE 50 MCG
2 SPRAY, SUSPENSION (ML) NASAL DAILY
Qty: 48 G | Refills: 10 | Status: SHIPPED | OUTPATIENT
Start: 2025-08-05

## 2025-08-13 ENCOUNTER — OFFICE VISIT (OUTPATIENT)
Dept: INTERNAL MEDICINE | Facility: CLINIC | Age: 75
End: 2025-08-13
Payer: MEDICARE

## 2025-08-13 VITALS
WEIGHT: 166 LBS | BODY MASS INDEX: 26.06 KG/M2 | DIASTOLIC BLOOD PRESSURE: 64 MMHG | HEART RATE: 87 BPM | HEIGHT: 67 IN | SYSTOLIC BLOOD PRESSURE: 122 MMHG

## 2025-08-13 DIAGNOSIS — E11.65 TYPE 2 DIABETES MELLITUS WITH HYPERGLYCEMIA, WITHOUT LONG-TERM CURRENT USE OF INSULIN: Chronic | ICD-10-CM

## 2025-08-13 DIAGNOSIS — G43.109 MIGRAINE WITH AURA AND WITHOUT STATUS MIGRAINOSUS, NOT INTRACTABLE: Primary | Chronic | ICD-10-CM

## 2025-08-13 DIAGNOSIS — J41.8 MIXED SIMPLE AND MUCOPURULENT CHRONIC BRONCHITIS: Chronic | ICD-10-CM

## 2025-08-13 RX ORDER — MIRABEGRON 50 MG/1
50 TABLET, FILM COATED, EXTENDED RELEASE ORAL DAILY
Qty: 90 TABLET | Refills: 1 | Status: SHIPPED | OUTPATIENT
Start: 2025-08-13

## 2025-08-13 RX ORDER — MEPOLIZUMAB 100 MG/ML
INJECTION, POWDER, FOR SOLUTION SUBCUTANEOUS
COMMUNITY
Start: 2025-07-28

## 2025-08-20 RX ORDER — MONTELUKAST SODIUM 10 MG/1
10 TABLET ORAL DAILY
Qty: 90 TABLET | Refills: 1 | Status: SHIPPED | OUTPATIENT
Start: 2025-08-20

## 2025-08-20 RX ORDER — SPIRONOLACTONE 100 MG/1
100 TABLET, FILM COATED ORAL DAILY
Qty: 90 TABLET | Refills: 1 | Status: SHIPPED | OUTPATIENT
Start: 2025-08-20

## (undated) DEVICE — VITAL SIGNS™ JACKSON-REES CIRCUITS: Brand: VITAL SIGNS™

## (undated) DEVICE — TUBING, SUCTION, 1/4" X 10', STRAIGHT: Brand: MEDLINE

## (undated) DEVICE — ADAPT SWVL FIBROPTIC BRONCH

## (undated) DEVICE — SENSR O2 OXIMAX FNGR A/ 18IN NONSTR

## (undated) DEVICE — SWIVEL CONNECTOR

## (undated) DEVICE — ADAPT CLN BIOGUARD AIR/H2O DISP

## (undated) DEVICE — TRAP,MUCUS SPECIMEN, 80CC: Brand: MEDLINE

## (undated) DEVICE — SINGLE USE BIOPSY VALVE MAJ-210: Brand: SINGLE USE BIOPSY VALVE (STERILE)

## (undated) DEVICE — FRCP BX RADJAW4 PULM WO NDL STD1.8X2 100

## (undated) DEVICE — SINGLE USE SUCTION VALVE MAJ-209: Brand: SINGLE USE SUCTION VALVE (STERILE)

## (undated) DEVICE — VISION PROBE ADAPTER AND SUCTION ADAPTER

## (undated) DEVICE — Device: Brand: ION

## (undated) DEVICE — LARGE BORE STOPCOCK WITH EXTENSION SET, MALE LUER LOCK ADAPTER WITH RETRACTABLE COLLAR